# Patient Record
Sex: FEMALE | Race: WHITE | NOT HISPANIC OR LATINO | Employment: FULL TIME | URBAN - NONMETROPOLITAN AREA
[De-identification: names, ages, dates, MRNs, and addresses within clinical notes are randomized per-mention and may not be internally consistent; named-entity substitution may affect disease eponyms.]

---

## 2017-01-30 ENCOUNTER — OFFICE VISIT (OUTPATIENT)
Dept: ONCOLOGY | Facility: CLINIC | Age: 42
End: 2017-01-30

## 2017-01-30 ENCOUNTER — LAB (OUTPATIENT)
Dept: ONCOLOGY | Facility: CLINIC | Age: 42
End: 2017-01-30

## 2017-01-30 VITALS
RESPIRATION RATE: 16 BRPM | DIASTOLIC BLOOD PRESSURE: 78 MMHG | OXYGEN SATURATION: 95 % | SYSTOLIC BLOOD PRESSURE: 136 MMHG | WEIGHT: 133.4 LBS | HEART RATE: 84 BPM | HEIGHT: 64 IN | TEMPERATURE: 97.5 F | BODY MASS INDEX: 22.77 KG/M2

## 2017-01-30 DIAGNOSIS — C50.919 MALIGNANT NEOPLASM OF OTHER SPECIFIED SITES OF FEMALE BREAST: Primary | ICD-10-CM

## 2017-01-30 DIAGNOSIS — C50.919: Primary | ICD-10-CM

## 2017-01-30 LAB
ALBUMIN SERPL-MCNC: 4.3 G/DL (ref 3.5–5)
ALBUMIN/GLOB SERPL: 1.4 G/DL
ALP SERPL-CCNC: 43 U/L (ref 38–126)
ALT SERPL W P-5'-P-CCNC: 27 U/L (ref 9–52)
ANION GAP SERPL CALCULATED.3IONS-SCNC: 12 MMOL/L
AST SERPL-CCNC: 30 U/L (ref 5–40)
AUTO MIXED CELLS #: 0.4 10*3/UL (ref 0.1–1.5)
AUTO MIXED CELLS %: 6.1 % (ref 0.2–15.1)
BILIRUB SERPL-MCNC: 0.6 MG/DL (ref 0.2–1.3)
BUN BLD-MCNC: 17 MG/DL (ref 7–26)
BUN/CREAT SERPL: 18.9 (ref 7–25)
CALCIUM SPEC-SCNC: 9.2 MG/DL (ref 8.4–10.2)
CHLORIDE SERPL-SCNC: 102 MMOL/L (ref 98–107)
CO2 SERPL-SCNC: 29 MMOL/L (ref 22–30)
CREAT BLD-MCNC: 0.9 MG/DL (ref 0.7–1.4)
ERYTHROCYTE [DISTWIDTH] IN BLOOD BY AUTOMATED COUNT: 13.7 % (ref 11.5–14.5)
GFR SERPL CREATININE-BSD FRML MDRD: 70 ML/MIN/1.73
GLOBULIN UR ELPH-MCNC: 3 GM/DL
GLUCOSE BLD-MCNC: 108 MG/DL (ref 75–110)
HCT VFR BLD AUTO: 35.3 % (ref 37–47)
HGB BLD-MCNC: 10.8 G/DL (ref 12–16)
LYMPHOCYTES # BLD AUTO: 1.8 10*3/MM3 (ref 0.8–7)
LYMPHOCYTES NFR BLD AUTO: 29.2 % (ref 10–58.5)
MCH RBC QN AUTO: 27.7 PG (ref 27–31)
MCHC RBC AUTO-ENTMCNC: 30.6 G/DL (ref 33–37)
MCV RBC AUTO: 90.5 FL (ref 81–99)
NEUTROPHILS # BLD AUTO: 3.9 10*3/MM3 (ref 2–7.8)
NEUTROPHILS NFR BLD AUTO: 64.7 % (ref 37–92)
PLATELET # BLD AUTO: 222 10*3/MM3 (ref 130–400)
PMV BLD AUTO: 7.7 FL (ref 6–12)
POTASSIUM BLD-SCNC: 3.7 MMOL/L (ref 3.6–5)
PROT SERPL-MCNC: 7.3 G/DL (ref 6.3–8.2)
RBC # BLD AUTO: 3.9 10*6/MM3 (ref 4.2–5.4)
SODIUM BLD-SCNC: 143 MMOL/L (ref 137–145)
WBC NRBC COR # BLD: 6.1 10*3/MM3 (ref 4.8–10.8)

## 2017-01-30 PROCEDURE — 99214 OFFICE O/P EST MOD 30 MIN: CPT | Performed by: INTERNAL MEDICINE

## 2017-01-30 PROCEDURE — 80053 COMPREHEN METABOLIC PANEL: CPT | Performed by: INTERNAL MEDICINE

## 2017-01-30 PROCEDURE — 36415 COLL VENOUS BLD VENIPUNCTURE: CPT | Performed by: INTERNAL MEDICINE

## 2017-01-30 PROCEDURE — 85025 COMPLETE CBC W/AUTO DIFF WBC: CPT | Performed by: INTERNAL MEDICINE

## 2017-01-30 NOTE — MR AVS SNAPSHOT
"                        Vanesa Manuel   1/30/2017 2:20 PM   Office Visit    Dept Phone:  [phone]   Encounter #:  [id number]    Provider:  Keagan Lindsey MD   Department:  [facility] HEMATOLOGY & ONCOLOGY                Your Full Care Plan              Your Updated Medication List          This list is accurate as of: 1/30/17  3:04 PM.  Always use your most recent med list.                cefprozil 250 MG tablet   Commonly known as:  CEFZIL       clotrimazole 1 % cream   Commonly known as:  LOTRIMIN       tamoxifen 20 MG chemo tablet   Commonly known as:  NOLVADEX       venlafaxine 25 MG tablet   Commonly known as:  EFFEXOR               Instructions     None    Patient Instructions History      Upcoming Appointments     Visit Type Date Time Department    FOLLOW UP 1 UNIT 1/30/2017  2:20 PM [facility]    LAB 1/30/2017  2:00 PM [facility]    LAB 4/24/2017  2:00 PM [facility]    FOLLOW UP 1 UNIT 4/24/2017  2:20 PM [facility]      MyChart Signup     Our records indicate that you have declined [facility] MyChart signup. If you would like to sign up for MyChart, please email [email or web address] or call [phone] to obtain an activation code.             Other Info from Your Visit           Your Appointments     Apr 24, 2017  2:00 PM CDT   LAB with [facility]   [facility] HEMATOLOGY & ONCOLOGY ([place])    [address] [place] [address]   [phone]            Apr 24, 2017  2:20 PM CDT   FOLLOW UP with Keagan Lindsey MD   [facility] HEMATOLOGY & ONCOLOGY ([place])    [address] [place] [address]   [phone]              Allergies     Percocet [Oxycodone-acetaminophen]        Vital Signs     Blood Pressure Pulse Temperature Respirations Height Weight    136/78 84 97.5 °F (36.4 °C) (Tympanic) 16 64\" (162.6 cm) 133 lb 6.4 oz (60.5 kg)    Last Menstrual Period Oxygen Saturation Body Mass Index Smoking Status      "    (LMP Unknown) 95% 22.9 kg/m2 Former Smoker

## 2017-01-30 NOTE — PROGRESS NOTES
[facility]  HEMATOLOGY & ONCOLOGY        Subjective     VISIT DIAGNOSIS:   No diagnosis found.    REASON FOR VISIT:   No chief complaint on file.       HEMATOLOGY / ONCOLOGY HISTORY: Ms. Manuel is a 40 year old female patient, stage IIA breast carcinoma for which she underwent neoadjuvant chemotherapy with four  cycles of Adriamycin, Cytoxan followed by 12 weekly courses of Taxol. She did have ER positive, [place] positive, HER2/  deepika negative  disease and her BRCA testing was also negative. She underwent bilateral mastectomies, this was followed by reconstruction. She has  also been on Tamoxifen for hormonal manipulation since September 2014 and tolerating it well.   No history exists.       Cancer Staging Information:  No matching staging information was found for the patient.      INTERVAL HISTORY  Patient ID: Vanesa Manuel is a 41 y.o. year old female         Review of Systems   Constitutional: Negative.    HENT: Negative.    Eyes: Negative.    Respiratory: Negative.    Cardiovascular: Negative.    Gastrointestinal: Negative.    Endocrine: Negative.    Genitourinary: Negative.    Musculoskeletal: Negative.    Skin: Negative.    Allergic/Immunologic: Negative.    Neurological: Negative.    Hematological: Negative.    Psychiatric/Behavioral: Negative.             Medications:    Current Outpatient Prescriptions   Medication Sig Dispense Refill   • cefprozil (CEFZIL) 250 MG tablet TAKE 1 TABLET BY MOUTH EVERY 12 HOURS FOR 10 DAYS  0   • clotrimazole (LOTRIMIN) 1 % cream APPLY AS DIRECTED TWICE A DAY  1   • tamoxifen (NOLVADEX) 20 MG chemo tablet Take  by mouth Daily.     • venlafaxine (EFFEXOR) 25 MG tablet Take 25 mg by mouth 2 (Two) Times a Day.       No current facility-administered medications for this visit.        ALLERGIES:    Allergies   Allergen Reactions   • Percocet [Oxycodone-Acetaminophen]        Objective      @VITALS    No flowsheet data found.    General Appearance: Patient is awake,  alert, oriented and in no acute distress. Patient is welldeveloped, wellnourished, and appears stated age.  HEENT: Normocephalic. Sclerae clear, conjunctiva pink, extraocular movements intact, pupils, round, reactive to light and  accommodation. Mouth and throat are clear with moist oral mucosa.  NECK: Supple, no jugular venous distention, thyroid not enlarged.  LYMPH: No cervical, supraclavicular, axillary, or inguinal lymphadenopathy.  CHEST: Equal bilateral expansion, AP  diameter normal, resonant percussion note  LUNGS: Good air movement, no rales, rhonchi, rubs or wheezes with auscultation  CARDIO: Regular sinus rhythm, no murmurs, gallops or rubs.  ABDOMEN: Nondistended, soft, No tenderness, no guarding, no rebound, No hepatosplenomegaly. No abdominal masses. Bowel sounds positive. No hernia  GENITALIA: Not examined.  BREASTS: Not examined.  MUSKEL: No joint swelling, decreased motion, or inflammation  EXTREMS: No edema, clubbing, cyanosis, No varicose veins.  NEURO: Grossly nonfocal, Gait is coordinated and smooth, Cognition is preserved.  SKIN: No rashes, no ecchymoses, no petechia.  PSYCH: Oriented to time, place and person. Memory is preserved. Mood and affect appear normal      RECENT LABS:  No visits with results within 7 Day(s) from this visit.  Latest known visit with results is:    Lab on 12/30/2016   Component Date Value Ref Range Status   • Glucose 12/30/2016 142* 70 - 100 mg/dL Final   • BUN 12/30/2016 12  5 - 21 mg/dL Final   • Creatinine 12/30/2016 0.66  0.50 - 1.40 mg/dL Final   • Sodium 12/30/2016 141  135 - 145 mmol/L Final   • Potassium 12/30/2016 3.8  3.5 - 5.3 mmol/L Final   • Chloride 12/30/2016 100  98 - 110 mmol/L Final   • CO2 12/30/2016 30.0  24.0 - 31.0 mmol/L Final   • Calcium 12/30/2016 8.7  8.4 - 10.4 mg/dL Final   • Total Protein 12/30/2016 7.2  6.3 - 8.7 g/dL Final   • Albumin 12/30/2016 4.00  3.50 - 5.00 g/dL Final   • ALT (SGPT) 12/30/2016 31  0 - 54 U/L Final   • AST (SGOT)  12/30/2016 52* 7 - 45 U/L Final   • Alkaline Phosphatase 12/30/2016 40  24 - 120 U/L Final   • Total Bilirubin 12/30/2016 0.6  0.1 - 1.0 mg/dL Final   • eGFR Non African Amer 12/30/2016 100  >60 mL/min/1.73 Final   • Globulin 12/30/2016 3.2  gm/dL Final   • A/G Ratio 12/30/2016 1.3  1.1 - 2.5 g/dL Final   • BUN/Creatinine Ratio 12/30/2016 18.2  7.0 - 25.0 Final   • Anion Gap 12/30/2016 11.0  4.0 - 13.0 mmol/L Final   • WBC 12/30/2016 5.40  4.80 - 10.80 10*3/mm3 Final   • RBC 12/30/2016 3.63* 4.20 - 5.40 10*6/mm3 Final   • Hemoglobin 12/30/2016 10.0* 12.0 - 16.0 g/dL Final   • Hematocrit 12/30/2016 33.3* 37.0 - 47.0 % Final   • MCV 12/30/2016 91.7  81.0 - 99.0 fL Final   • MCH 12/30/2016 27.5  27.0 - 31.0 pg Final   • MCHC 12/30/2016 30.0* 33.0 - 37.0 g/dL Final   • RDW 12/30/2016 13.1  11.5 - 14.5 % Final   • MPV 12/30/2016 8.9  6.0 - 12.0 fL Final   • Platelets 12/30/2016 192  130 - 400 10*3/mm3 Final   • Neutrophil % 12/30/2016 63.1  37.0 - 92.0 % Final   • Lymphocyte % 12/30/2016 32.1  10.0 - 58.5 % Final   • Auto Mixed Cells % 12/30/2016 4.8  0.2 - 15.1 % Final   • Neutrophils, Absolute 12/30/2016 3.40  2.00 - 7.80 10*3/mm3 Final   • Lymphocytes, Absolute 12/30/2016 1.70  0.80 - 7.00 10*3/mm3 Final   • Auto Mixed Cells # 12/30/2016 0.30  0.10 - 1.50 10*3/uL Final       RADIOLOGY:  No results found.         Assessment/Plan      Ms. Mar is a 40 year old female patient, stage IIA breast carcinoma for which she underwent neoadjuvant chemotherapy with four  cycles of Adriamycin, Cytoxan followed by 12 weekly courses of Taxol. She did have ER positive, [place] positive, HER2/  deepika negative  disease and her BRCA testing was also negative. She underwent bilateral mastectomies, this was followed by reconstruction. She has  also been on Tamoxifen.  She is Pre menupausal and must take Gosrelin or Lupron with Tamoxifen for Ovarian supression, unless she opts for HSO for which I have encouraged her to talk to her OB.  Her  estradiol levels drawn back in November 30 were on the higher side at 75.  Of course she is premenopausal and I need to treat her with Lupron or Gosrelin to stop the ovaries from estrogen production unless she decides to get a hysterosalpinoopherectomy, for this she is trying to find a gynecologist.  She will let me know in the next 6-8 weeks.        Keagan Lindsey MD    1/30/2017    1:49 PM

## 2017-03-09 ENCOUNTER — OFFICE VISIT (OUTPATIENT)
Dept: OBGYN | Age: 42
End: 2017-03-09
Payer: MEDICAID

## 2017-03-09 VITALS
WEIGHT: 132 LBS | HEIGHT: 63 IN | HEART RATE: 88 BPM | DIASTOLIC BLOOD PRESSURE: 89 MMHG | BODY MASS INDEX: 23.39 KG/M2 | TEMPERATURE: 98.4 F | SYSTOLIC BLOOD PRESSURE: 130 MMHG

## 2017-03-09 DIAGNOSIS — Z12.4 SCREENING FOR MALIGNANT NEOPLASM OF CERVIX: ICD-10-CM

## 2017-03-09 DIAGNOSIS — Z85.3 PERSONAL HISTORY OF BREAST CANCER: ICD-10-CM

## 2017-03-09 DIAGNOSIS — Z76.89 ENCOUNTER TO ESTABLISH CARE WITH NEW DOCTOR: Primary | ICD-10-CM

## 2017-03-09 DIAGNOSIS — Z01.419 ENCOUNTER FOR GYNECOLOGICAL EXAMINATION WITHOUT ABNORMAL FINDING: ICD-10-CM

## 2017-03-09 DIAGNOSIS — R23.2 HOT FLASHES: ICD-10-CM

## 2017-03-09 DIAGNOSIS — Z80.49 FAMILY HISTORY OF UTERINE CANCER: ICD-10-CM

## 2017-03-09 DIAGNOSIS — Z79.810 USE OF TAMOXIFEN (NOLVADEX): ICD-10-CM

## 2017-03-09 DIAGNOSIS — Z80.0 FAMILY HISTORY OF COLON CANCER: ICD-10-CM

## 2017-03-09 PROCEDURE — 99386 PREV VISIT NEW AGE 40-64: CPT

## 2017-03-09 ASSESSMENT — ENCOUNTER SYMPTOMS
SHORTNESS OF BREATH: 0
CONSTIPATION: 0
DIARRHEA: 0
TROUBLE SWALLOWING: 0
BACK PAIN: 0
COLOR CHANGE: 0
COUGH: 0
BLOOD IN STOOL: 0

## 2017-03-22 ENCOUNTER — TELEPHONE (OUTPATIENT)
Dept: OBGYN | Age: 42
End: 2017-03-22

## 2017-03-27 ENCOUNTER — OFFICE VISIT (OUTPATIENT)
Dept: OBGYN | Age: 42
End: 2017-03-27
Payer: MEDICAID

## 2017-03-27 ENCOUNTER — HOSPITAL ENCOUNTER (OUTPATIENT)
Dept: ULTRASOUND IMAGING | Age: 42
Discharge: HOME | End: 2017-03-27
Payer: MEDICAID

## 2017-03-27 VITALS
BODY MASS INDEX: 23.92 KG/M2 | SYSTOLIC BLOOD PRESSURE: 134 MMHG | WEIGHT: 135 LBS | HEART RATE: 89 BPM | DIASTOLIC BLOOD PRESSURE: 81 MMHG | HEIGHT: 63 IN | TEMPERATURE: 98.4 F

## 2017-03-27 DIAGNOSIS — Z85.3 PERSONAL HISTORY OF BREAST CANCER: ICD-10-CM

## 2017-03-27 DIAGNOSIS — Z13.71 BRCA NEGATIVE: ICD-10-CM

## 2017-03-27 DIAGNOSIS — R87.610 ATYPICAL SQUAMOUS CELL CHANGES OF UNDETERMINED SIGNIFICANCE (ASCUS) ON CERVICAL CYTOLOGY WITH POSITIVE HIGH RISK HUMAN PAPILLOMA VIRUS (HPV): ICD-10-CM

## 2017-03-27 DIAGNOSIS — N92.6 IRREGULAR MENSES: ICD-10-CM

## 2017-03-27 DIAGNOSIS — Z79.810 USE OF TAMOXIFEN (NOLVADEX): ICD-10-CM

## 2017-03-27 DIAGNOSIS — R87.810 ATYPICAL SQUAMOUS CELL CHANGES OF UNDETERMINED SIGNIFICANCE (ASCUS) ON CERVICAL CYTOLOGY WITH POSITIVE HIGH RISK HUMAN PAPILLOMA VIRUS (HPV): ICD-10-CM

## 2017-03-27 DIAGNOSIS — Z80.49 FAMILY HISTORY OF UTERINE CANCER: ICD-10-CM

## 2017-03-27 PROCEDURE — 76830 TRANSVAGINAL US NON-OB: CPT

## 2017-03-27 PROCEDURE — 99214 OFFICE O/P EST MOD 30 MIN: CPT

## 2017-03-27 ASSESSMENT — ENCOUNTER SYMPTOMS
BACK PAIN: 0
BLOOD IN STOOL: 0
TROUBLE SWALLOWING: 0
SHORTNESS OF BREATH: 0
COLOR CHANGE: 0
COUGH: 0
DIARRHEA: 0
CONSTIPATION: 0

## 2017-04-12 ENCOUNTER — OFFICE VISIT (OUTPATIENT)
Dept: SURGERY | Age: 42
End: 2017-04-12
Payer: MEDICAID

## 2017-04-12 VITALS
HEART RATE: 78 BPM | WEIGHT: 137 LBS | BODY MASS INDEX: 23.39 KG/M2 | HEIGHT: 64 IN | RESPIRATION RATE: 16 BRPM | DIASTOLIC BLOOD PRESSURE: 68 MMHG | SYSTOLIC BLOOD PRESSURE: 114 MMHG

## 2017-04-12 DIAGNOSIS — Z90.13 S/P BILATERAL MASTECTOMY: Primary | ICD-10-CM

## 2017-04-12 DIAGNOSIS — C50.812 MALIGNANT NEOPLASM OF OVERLAPPING SITES OF LEFT FEMALE BREAST (HCC): ICD-10-CM

## 2017-04-12 DIAGNOSIS — Z13.71 BRCA NEGATIVE: ICD-10-CM

## 2017-04-12 PROCEDURE — 99213 OFFICE O/P EST LOW 20 MIN: CPT | Performed by: SURGERY

## 2017-04-13 ENCOUNTER — PROCEDURE VISIT (OUTPATIENT)
Dept: OBGYN | Age: 42
End: 2017-04-13
Payer: MEDICAID

## 2017-04-13 VITALS
HEART RATE: 80 BPM | WEIGHT: 135 LBS | SYSTOLIC BLOOD PRESSURE: 114 MMHG | HEIGHT: 64 IN | BODY MASS INDEX: 23.05 KG/M2 | DIASTOLIC BLOOD PRESSURE: 75 MMHG

## 2017-04-13 DIAGNOSIS — R87.810 ATYPICAL SQUAMOUS CELL CHANGES OF UNDETERMINED SIGNIFICANCE (ASCUS) ON CERVICAL CYTOLOGY WITH POSITIVE HIGH RISK HUMAN PAPILLOMA VIRUS (HPV): Primary | ICD-10-CM

## 2017-04-13 DIAGNOSIS — R87.610 ATYPICAL SQUAMOUS CELL CHANGES OF UNDETERMINED SIGNIFICANCE (ASCUS) ON CERVICAL CYTOLOGY WITH POSITIVE HIGH RISK HUMAN PAPILLOMA VIRUS (HPV): Primary | ICD-10-CM

## 2017-04-13 PROCEDURE — 57454 BX/CURETT OF CERVIX W/SCOPE: CPT

## 2017-04-13 PROCEDURE — 81025 URINE PREGNANCY TEST: CPT

## 2017-04-13 ASSESSMENT — ENCOUNTER SYMPTOMS
DIARRHEA: 0
SHORTNESS OF BREATH: 0
TROUBLE SWALLOWING: 0
COUGH: 0
COLOR CHANGE: 0
CONSTIPATION: 0
BACK PAIN: 0
BLOOD IN STOOL: 0

## 2017-04-19 ENCOUNTER — TELEPHONE (OUTPATIENT)
Dept: OBGYN | Age: 42
End: 2017-04-19

## 2017-04-20 ENCOUNTER — OFFICE VISIT (OUTPATIENT)
Dept: RETAIL CLINIC | Facility: CLINIC | Age: 42
End: 2017-04-20

## 2017-04-20 VITALS
RESPIRATION RATE: 18 BRPM | SYSTOLIC BLOOD PRESSURE: 120 MMHG | WEIGHT: 133.8 LBS | BODY MASS INDEX: 23.71 KG/M2 | OXYGEN SATURATION: 98 % | DIASTOLIC BLOOD PRESSURE: 84 MMHG | HEART RATE: 77 BPM | HEIGHT: 63 IN | TEMPERATURE: 99.5 F

## 2017-04-20 DIAGNOSIS — L03.011 PARONYCHIA OF RIGHT THUMB: ICD-10-CM

## 2017-04-20 DIAGNOSIS — J06.9 ACUTE URI: Primary | ICD-10-CM

## 2017-04-20 LAB
EXPIRATION DATE: NORMAL
EXPIRATION DATE: NORMAL
FLUAV AG NPH QL: NEGATIVE
FLUBV AG NPH QL: NEGATIVE
INTERNAL CONTROL: NORMAL
INTERNAL CONTROL: NORMAL
Lab: NORMAL
Lab: NORMAL
S PYO AG THROAT QL: NEGATIVE

## 2017-04-20 PROCEDURE — 99213 OFFICE O/P EST LOW 20 MIN: CPT | Performed by: NURSE PRACTITIONER

## 2017-04-20 PROCEDURE — 87880 STREP A ASSAY W/OPTIC: CPT | Performed by: NURSE PRACTITIONER

## 2017-04-20 PROCEDURE — 87804 INFLUENZA ASSAY W/OPTIC: CPT | Performed by: NURSE PRACTITIONER

## 2017-04-20 RX ORDER — CEPHALEXIN 500 MG/1
500 CAPSULE ORAL 4 TIMES DAILY
Qty: 28 CAPSULE | Refills: 0 | Status: SHIPPED | OUTPATIENT
Start: 2017-04-20 | End: 2017-04-27

## 2017-04-20 NOTE — PATIENT INSTRUCTIONS
"Upper Respiratory Infection, Adult  Most upper respiratory infections (URIs) are a viral infection of the air passages leading to the lungs. A URI affects the nose, throat, and upper air passages. The most common type of URI is nasopharyngitis and is typically referred to as \"the common cold.\"  URIs run their course and usually go away on their own. Most of the time, a URI does not require medical attention, but sometimes a bacterial infection in the upper airways can follow a viral infection. This is called a secondary infection. Sinus and middle ear infections are common types of secondary upper respiratory infections.  Bacterial pneumonia can also complicate a URI. A URI can worsen asthma and chronic obstructive pulmonary disease (COPD). Sometimes, these complications can require emergency medical care and may be life threatening.   CAUSES  Almost all URIs are caused by viruses. A virus is a type of germ and can spread from one person to another.   RISKS FACTORS  You may be at risk for a URI if:   · You smoke.    · You have chronic heart or lung disease.  · You have a weakened defense (immune) system.    · You are very young or very old.    · You have nasal allergies or asthma.  · You work in crowded or poorly ventilated areas.  · You work in health care facilities or schools.  SIGNS AND SYMPTOMS   Symptoms typically develop 2-3 days after you come in contact with a cold virus. Most viral URIs last 7-10 days. However, viral URIs from the influenza virus (flu virus) can last 14-18 days and are typically more severe. Symptoms may include:   · Runny or stuffy (congested) nose.    · Sneezing.    · Cough.    · Sore throat.    · Headache.    · Fatigue.    · Fever.    · Loss of appetite.    · Pain in your forehead, behind your eyes, and over your cheekbones (sinus pain).  · Muscle aches.    DIAGNOSIS   Your health care provider may diagnose a URI by:  · Physical exam.  · Tests to check that your symptoms are not due to " another condition such as:  ¨ Strep throat.  ¨ Sinusitis.  ¨ Pneumonia.  ¨ Asthma.  TREATMENT   A URI goes away on its own with time. It cannot be cured with medicines, but medicines may be prescribed or recommended to relieve symptoms. Medicines may help:  · Reduce your fever.  · Reduce your cough.  · Relieve nasal congestion.  HOME CARE INSTRUCTIONS   · Take medicines only as directed by your health care provider.    · Gargle warm saltwater or take cough drops to comfort your throat as directed by your health care provider.  · Use a warm mist humidifier or inhale steam from a shower to increase air moisture. This may make it easier to breathe.  · Drink enough fluid to keep your urine clear or pale yellow.    · Eat soups and other clear broths and maintain good nutrition.    · Rest as needed.    · Return to work when your temperature has returned to normal or as your health care provider advises. You may need to stay home longer to avoid infecting others. You can also use a face mask and careful hand washing to prevent spread of the virus.  · Increase the usage of your inhaler if you have asthma.    · Do not use any tobacco products, including cigarettes, chewing tobacco, or electronic cigarettes. If you need help quitting, ask your health care provider.  PREVENTION   The best way to protect yourself from getting a cold is to practice good hygiene.   · Avoid oral or hand contact with people with cold symptoms.    · Wash your hands often if contact occurs.    There is no clear evidence that vitamin C, vitamin E, echinacea, or exercise reduces the chance of developing a cold. However, it is always recommended to get plenty of rest, exercise, and practice good nutrition.   SEEK MEDICAL CARE IF:   · You are getting worse rather than better.    · Your symptoms are not controlled by medicine.    · You have chills.  · You have worsening shortness of breath.  · You have brown or red mucus.  · You have yellow or brown nasal  discharge.  · You have pain in your face, especially when you bend forward.  · You have a fever.  · You have swollen neck glands.  · You have pain while swallowing.  · You have white areas in the back of your throat.  SEEK IMMEDIATE MEDICAL CARE IF:   · You have severe or persistent:    Headache.    Ear pain.    Sinus pain.    Chest pain.  · You have chronic lung disease and any of the following:    Wheezing.    Prolonged cough.    Coughing up blood.    A change in your usual mucus.  · You have a stiff neck.  · You have changes in your:    Vision.    Hearing.    Thinking.    Mood.  MAKE SURE YOU:   · Understand these instructions.  · Will watch your condition.  · Will get help right away if you are not doing well or get worse.     This information is not intended to replace advice given to you by your health care provider. Make sure you discuss any questions you have with your health care provider.     Document Released: 06/13/2002 Document Revised: 05/03/2016 Document Reviewed: 03/25/2015  [employer] Interactive Patient Education ©2016 [employer]    Paronychia  Paronychia is an infection of the skin that surrounds a nail. It usually affects the skin around a fingernail, but it may also occur near a toenail. It often causes pain and swelling around the nail. This condition may come on suddenly or develop over a longer period. In some cases, a collection of pus (abscess) can form near or under the nail. Usually, paronychia is not serious and it clears up with treatment.  CAUSES  This condition may be caused by bacteria or fungi. It is commonly caused by either Streptococcus or Staphylococcus bacteria. The bacteria or fungi often cause the infection by getting into the affected area through an opening in the skin, such as a cut or a hangnail.  RISK FACTORS  This condition is more likely to develop in:  · People who get their hands wet often, such as those who work as dishwashers, , or nurses.  · People who  bite their fingernails or suck their thumbs.  · People who trim their nails too short.  · People who have hangnails or injured fingertips.  · People who get manicures.  · People who have diabetes.  SYMPTOMS  Symptoms of this condition include:  · Redness and swelling of the skin near the nail.  · Tenderness around the nail when you touch the area.  · Pus-filled bumps under the cuticle. The cuticle is the skin at the base or sides of the nail.  · Fluid or pus under the nail.  · Throbbing pain in the area.  DIAGNOSIS  This condition is usually diagnosed with a physical exam. In some cases, a sample of pus may be taken from an abscess to be tested in a lab. This can help to determine what type of bacteria or fungi is causing the condition.  TREATMENT  Treatment for this condition depends on the cause and severity of the condition. If the condition is mild, it may clear up on its own in a few days. Your health care provider may recommend soaking the affected area in warm water a few times a day. When treatment is needed, the options may include:  · Antibiotic medicine, if the condition is caused by a bacterial infection.  · Antifungal medicine, if the condition is caused by a fungal infection.  · Incision and drainage, if an abscess is present. In this procedure, the health care provider will cut open the abscess so the pus can drain out.  HOME CARE INSTRUCTIONS  · Soak the affected area in warm water if directed to do so by your health care provider. You may be told to do this for 20 minutes, 2-3 times a day. Keep the area dry in between soakings.  · Take medicines only as directed by your health care provider.  · If you were prescribed an antibiotic medicine, finish all of it even if you start to feel better.  · Keep the affected area clean.  · Do not try to drain a fluid-filled bump yourself.  · If you will be washing dishes or performing other tasks that require your hands to get wet, wear rubber gloves. You should  also wear gloves if your hands might come in contact with irritating substances, such as  or chemicals.  · Follow your health care provider's instructions about:    Wound care.    Bandage (dressing) changes and removal.  SEEK MEDICAL CARE IF:  · Your symptoms get worse or do not improve with treatment.  · You have a fever or chills.  · You have redness spreading from the affected area.  · You have continued or increased fluid, blood, or pus coming from the affected area.  · Your finger or knuckle becomes swollen or is difficult to move.     This information is not intended to replace advice given to you by your health care provider. Make sure you discuss any questions you have with your health care provider.     Document Released: 06/13/2002 Document Revised: 05/03/2016 Document Reviewed: 11/25/2015  [employer] Interactive Patient Education ©2016 [employer]      Take Tylenol or Motrin if needed. Continue good fluid intake. Gargle warm salt water if needed to soothe your throat. Try plain Mucinex.  If symptoms persist or worsen please see your primary care provider.     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 Currently you have evidence of a secondary bacterial infection. Take the antibiotic as prescribed.  Please soak your thumb in warm water a couple of times a day and apply ointment.  Please discuss further with your dermatologist at your appointment in early May. Seek care sooner if worse.    An antibiotic has been prescribed for you that needs to be taken as directed for the full length of treatment. It is recommended that you take a probiotic when taking an antibiotic. Probiotics are found over the counter in pill form and in some brands of yogurt.

## 2017-04-20 NOTE — PROGRESS NOTES
Chief Complaint   Patient presents with   • Sore Throat   • Cough     Subjective   Vanesa Manuel is a 42 y.o. female who presents to the clinic today with complaints sore throat which started two days ago. She now has hoarsness and a tickle in her throat that is causing her to cough. She isn't sure if she has had fever.     She also complains of ongoing issues with possible fungal infection of her right thumbnail which has been present for about a year and not responding to the topical treatments she has tried. She also had occasional redness around the nail. She reports a few days ago she was able to express some puss and that periodically she is able to do that. She reports it is a little tender at times, but otherwise not really painful.    Current Outpatient Prescriptions:   •  tamoxifen (NOLVADEX) 20 MG chemo tablet, Take  by mouth Daily., Disp: , Rfl:   •  clotrimazole (LOTRIMIN) 1 % cream, APPLY AS DIRECTED TWICE A DAY, Disp: , Rfl: 1  •  venlafaxine (EFFEXOR) 25 MG tablet, Take 25 mg by mouth 2 (Two) Times a Day., Disp: , Rfl:     Allergies:  Percocet [oxycodone-acetaminophen]    Past Medical History:   Diagnosis Date   • Anemia    • Breast cancer    • Cancer     breast     Past Surgical History:   Procedure Laterality Date   • BREAST BIOPSY Left    • BREAST SURGERY      biopsy   •  SECTION     •  SECTION     • OTHER SURGICAL HISTORY      took out expanders and placed implants   • WISDOM TOOTH EXTRACTION       Family History   Problem Relation Age of Onset   • Cancer Maternal Aunt      breast with mets   • Cancer Paternal Uncle      colon   • No Known Problems Mother    • No Known Problems Father    • No Known Problems Brother    • No Known Problems Daughter    • No Known Problems Daughter    • No Known Problems Daughter      Social History   Substance Use Topics   • Smoking status: Former Smoker   • Smokeless tobacco: None   • Alcohol use Yes      Comment: occasional       Review of  "Systems  Review of Systems   Constitutional: Positive for chills (no chills) and fatigue. Fever: maybe, unsure.   HENT: Positive for ear pain (off and on), postnasal drip, sneezing and sore throat. Negative for rhinorrhea and sinus pressure.    Eyes: Negative.    Respiratory: Positive for cough. Negative for shortness of breath and wheezing.    Gastrointestinal: Negative for abdominal pain, constipation, diarrhea, nausea and vomiting.   Musculoskeletal: Negative for arthralgias.   Skin:        Right thumb with chronic fungal infection, now with redness at base of nail   Neurological: Negative for headaches.       Objective   /84 (BP Location: Left arm, Patient Position: Sitting, Cuff Size: Adult)  Pulse 77  Temp 99.5 °F (37.5 °C) (Oral)   Resp 18  Ht 63\" (160 cm)  Wt 133 lb 12.8 oz (60.7 kg)  LMP 02/27/2017 (Approximate)  SpO2 98%  BMI 23.7 kg/m2      Physical Exam   Constitutional: She is oriented to person, place, and time. She appears well-developed and well-nourished. She is cooperative.   HENT:   Head: Normocephalic and atraumatic.   Right Ear: Tympanic membrane, external ear and ear canal normal. Tympanic membrane is not perforated, not erythematous, not retracted and not bulging.   Left Ear: Tympanic membrane, external ear and ear canal normal. Tympanic membrane is not perforated, not erythematous, not retracted and not bulging.   Nose: Nose normal. Right sinus exhibits no maxillary sinus tenderness and no frontal sinus tenderness. Left sinus exhibits no maxillary sinus tenderness and no frontal sinus tenderness.   Mouth/Throat: Uvula is midline and mucous membranes are normal. Posterior oropharyngeal erythema (clear post nasal drainage) present. Tonsils are 1+ on the right. Tonsils are 1+ on the left. No tonsillar exudate.   Eyes: Conjunctivae, EOM and lids are normal. Pupils are equal, round, and reactive to light.   Neck: Trachea normal and normal range of motion. Neck supple. "   Cardiovascular: Normal rate, regular rhythm, S1 normal, S2 normal and normal heart sounds.    Pulmonary/Chest: Effort normal and breath sounds normal. She has no wheezes. She has no rhonchi. She has no rales.   Lymphadenopathy:     She has no cervical adenopathy.   Neurological: She is alert and oriented to person, place, and time. Coordination and gait normal.   Skin: Skin is warm, dry and intact.   Right thumbnail, cracking and discolored (yellow), skin proximal right side of base of nail with mild redness, minimal edema and warmth. No evidence of abscess, no fluctuance.    Psychiatric: She has a normal mood and affect. Her speech is normal and behavior is normal.          Assessment/Plan     Vanesa was seen today for sore throat and cough.    Diagnoses and all orders for this visit:    Acute URI  -     POC Rapid Strep A- negative  -     POC Influenza A / B- negative    Paronychia of right thumb    Other orders  -     cephalexin (KEFLEX) 500 MG capsule; Take 1 capsule by mouth 4 (Four) Times a Day for 7 days.  -     mupirocin (BACTROBAN) 2 % ointment; Apply  topically 2 (Two) Times a Day.      Take Tylenol or Motrin if needed. Continue good fluid intake. Gargle warm salt water if needed to soothe your throat. Try plain Mucinex.  If symptoms persist or worsen please see your primary care provider.     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 Currently you have evidence of a secondary bacterial infection. Take the antibiotic as prescribed.  Please soak your thumb in warm water a couple of times a day and apply ointment.  Please discuss further with your dermatologist at your appointment in early May. Seek care sooner if worse.    An antibiotic has been prescribed for you that needs to be taken as directed for the full length of treatment. It is recommended that you take a probiotic when taking an antibiotic. Probiotics are found over the counter in pill form and in some brands of yogurt.

## 2017-04-25 ENCOUNTER — OFFICE VISIT (OUTPATIENT)
Dept: ONCOLOGY | Facility: CLINIC | Age: 42
End: 2017-04-25

## 2017-04-25 ENCOUNTER — LAB (OUTPATIENT)
Dept: ONCOLOGY | Facility: CLINIC | Age: 42
End: 2017-04-25

## 2017-04-25 VITALS
OXYGEN SATURATION: 97 % | DIASTOLIC BLOOD PRESSURE: 82 MMHG | RESPIRATION RATE: 16 BRPM | TEMPERATURE: 97.9 F | SYSTOLIC BLOOD PRESSURE: 118 MMHG | BODY MASS INDEX: 24.15 KG/M2 | WEIGHT: 136.3 LBS | HEART RATE: 84 BPM | HEIGHT: 63 IN

## 2017-04-25 DIAGNOSIS — C50.919 MALIGNANT NEOPLASM OF OTHER SPECIFIED SITES OF FEMALE BREAST: Primary | ICD-10-CM

## 2017-04-25 DIAGNOSIS — C50.919: Primary | ICD-10-CM

## 2017-04-25 LAB
ALBUMIN SERPL-MCNC: 4.2 G/DL (ref 3.5–5)
ALBUMIN/GLOB SERPL: 1.3 G/DL
ALP SERPL-CCNC: 41 U/L (ref 38–126)
ALT SERPL W P-5'-P-CCNC: 25 U/L (ref 9–52)
ANION GAP SERPL CALCULATED.3IONS-SCNC: 9 MMOL/L
AST SERPL-CCNC: 39 U/L (ref 5–40)
AUTO MIXED CELLS #: 0.5 10*3/UL (ref 0.1–1.5)
AUTO MIXED CELLS %: 6.4 % (ref 0.2–15.1)
BILIRUB SERPL-MCNC: 0.5 MG/DL (ref 0.2–1.3)
BUN BLD-MCNC: 15 MG/DL (ref 7–26)
BUN/CREAT SERPL: 21.4 (ref 7–25)
CALCIUM SPEC-SCNC: 9 MG/DL (ref 8.4–10.2)
CHLORIDE SERPL-SCNC: 106 MMOL/L (ref 98–107)
CO2 SERPL-SCNC: 27 MMOL/L (ref 22–30)
CREAT BLD-MCNC: 0.7 MG/DL (ref 0.7–1.4)
ERYTHROCYTE [DISTWIDTH] IN BLOOD BY AUTOMATED COUNT: 12.6 % (ref 11.5–14.5)
GFR SERPL CREATININE-BSD FRML MDRD: 93 ML/MIN/1.73
GLOBULIN UR ELPH-MCNC: 3.2 GM/DL
GLUCOSE BLD-MCNC: 157 MG/DL (ref 75–110)
HCT VFR BLD AUTO: 35.5 % (ref 37–47)
HGB BLD-MCNC: 11.2 G/DL (ref 12–16)
LYMPHOCYTES # BLD AUTO: 2.2 10*3/MM3 (ref 0.8–7)
LYMPHOCYTES NFR BLD AUTO: 28.3 % (ref 10–58.5)
MCH RBC QN AUTO: 28.7 PG (ref 27–31)
MCHC RBC AUTO-ENTMCNC: 31.5 G/DL (ref 33–37)
MCV RBC AUTO: 91.1 FL (ref 81–99)
NEUTROPHILS # BLD AUTO: 5.1 10*3/MM3 (ref 2–7.8)
NEUTROPHILS NFR BLD AUTO: 65.3 % (ref 37–92)
PLATELET # BLD AUTO: 254 10*3/MM3 (ref 130–400)
PMV BLD AUTO: 8.6 FL (ref 6–12)
POTASSIUM BLD-SCNC: 4.1 MMOL/L (ref 3.6–5)
PROT SERPL-MCNC: 7.4 G/DL (ref 6.3–8.2)
RBC # BLD AUTO: 3.9 10*6/MM3 (ref 4.2–5.4)
SODIUM BLD-SCNC: 142 MMOL/L (ref 137–145)
WBC NRBC COR # BLD: 7.8 10*3/MM3 (ref 4.8–10.8)

## 2017-04-25 PROCEDURE — 85025 COMPLETE CBC W/AUTO DIFF WBC: CPT | Performed by: INTERNAL MEDICINE

## 2017-04-25 PROCEDURE — 99214 OFFICE O/P EST MOD 30 MIN: CPT | Performed by: INTERNAL MEDICINE

## 2017-04-25 PROCEDURE — 80053 COMPREHEN METABOLIC PANEL: CPT | Performed by: INTERNAL MEDICINE

## 2017-04-25 RX ORDER — VENLAFAXINE HYDROCHLORIDE 75 MG/1
CAPSULE, EXTENDED RELEASE ORAL
COMMUNITY
Start: 2017-01-29 | End: 2018-12-19

## 2017-04-25 NOTE — PROGRESS NOTES
[facility]  HEMATOLOGY & ONCOLOGY        Subjective     VISIT DIAGNOSIS:   No diagnosis found.    REASON FOR VISIT:   No chief complaint on file.       HEMATOLOGY / ONCOLOGY HISTORY: Ms. Manuel is a 40 year old female patient, stage IIA breast carcinoma for which she underwent neoadjuvant chemotherapy with four  cycles of Adriamycin, Cytoxan followed by 12 weekly courses of Taxol. She did have ER positive, VT positive, HER2/  deepika negative  disease and her BRCA testing was also negative. She underwent bilateral mastectomies, this was followed by reconstruction. She has  also been on Tamoxifen for hormonal manipulation since September 2014 and tolerating it well.   No history exists.       Cancer Staging Information:  No matching staging information was found for the patient.      INTERVAL HISTORY  Patient ID: Vanesa Manuel is a 42 y.o. year old female         Review of Systems   Constitutional: Negative.    HENT: Negative.    Eyes: Negative.    Respiratory: Negative.    Cardiovascular: Negative.    Gastrointestinal: Negative.    Endocrine: Negative.    Genitourinary: Negative.    Musculoskeletal: Negative.    Skin: Negative.    Allergic/Immunologic: Negative.    Neurological: Negative.    Hematological: Negative.    Psychiatric/Behavioral: Negative.             Medications:    Current Outpatient Prescriptions   Medication Sig Dispense Refill   • cephalexin (KEFLEX) 500 MG capsule Take 1 capsule by mouth 4 (Four) Times a Day for 7 days. 28 capsule 0   • clotrimazole (LOTRIMIN) 1 % cream APPLY AS DIRECTED TWICE A DAY  1   • mupirocin (BACTROBAN) 2 % ointment Apply  topically 2 (Two) Times a Day. 22 g 0   • tamoxifen (NOLVADEX) 20 MG chemo tablet Take  by mouth Daily.     • venlafaxine (EFFEXOR) 25 MG tablet Take 25 mg by mouth 2 (Two) Times a Day.     • venlafaxine XR (EFFEXOR-XR) 75 MG 24 hr capsule        No current facility-administered medications for this visit.        ALLERGIES:    Allergies    Allergen Reactions   • Percocet [Oxycodone-Acetaminophen]        Objective      @VITALS    Current Status 4/25/2017   ECOG score 0       General Appearance: Patient is awake, alert, oriented and in no acute distress. Patient is welldeveloped, wellnourished, and appears stated age.  HEENT: Normocephalic. Sclerae clear, conjunctiva pink, extraocular movements intact, pupils, round, reactive to light and  accommodation. Mouth and throat are clear with moist oral mucosa.  NECK: Supple, no jugular venous distention, thyroid not enlarged.  LYMPH: No cervical, supraclavicular, axillary, or inguinal lymphadenopathy.  CHEST: Equal bilateral expansion, AP  diameter normal, resonant percussion note  LUNGS: Good air movement, no rales, rhonchi, rubs or wheezes with auscultation  CARDIO: Regular sinus rhythm, no murmurs, gallops or rubs.  ABDOMEN: Nondistended, soft, No tenderness, no guarding, no rebound, No hepatosplenomegaly. No abdominal masses. Bowel sounds positive. No hernia  GENITALIA: Not examined.  BREASTS: Not examined.  MUSKEL: No joint swelling, decreased motion, or inflammation  EXTREMS: No edema, clubbing, cyanosis, No varicose veins.  NEURO: Grossly nonfocal, Gait is coordinated and smooth, Cognition is preserved.  SKIN: No rashes, no ecchymoses, no petechia.  PSYCH: Oriented to time, place and person. Memory is preserved. Mood and affect appear normal      RECENT LABS:  Orders Only on 04/25/2017   Component Date Value Ref Range Status   • Glucose 04/25/2017 157* 75 - 110 mg/dL Final   • BUN 04/25/2017 15  7 - 26 mg/dL Final   • Creatinine 04/25/2017 0.70  0.70 - 1.40 mg/dL Final   • Sodium 04/25/2017 142  137 - 145 mmol/L Final   • Potassium 04/25/2017 4.1  3.6 - 5.0 mmol/L Final   • Chloride 04/25/2017 106  98 - 107 mmol/L Final   • CO2 04/25/2017 27.0  22.0 - 30.0 mmol/L Final   • Calcium 04/25/2017 9.0  8.4 - 10.2 mg/dL Final   • Total Protein 04/25/2017 7.4  6.3 - 8.2 g/dL Final   • Albumin 04/25/2017 4.20   3.50 - 5.00 g/dL Final   • ALT (SGPT) 04/25/2017 25  9 - 52 U/L Final   • AST (SGOT) 04/25/2017 39  5 - 40 U/L Final   • Alkaline Phosphatase 04/25/2017 41  38 - 126 U/L Final   • Total Bilirubin 04/25/2017 0.5  0.2 - 1.3 mg/dL Final   • eGFR Non African Amer 04/25/2017 93  >60 mL/min/1.73 Final   • Globulin 04/25/2017 3.2  gm/dL Final   • A/G Ratio 04/25/2017 1.3  g/dL Final   • BUN/Creatinine Ratio 04/25/2017 21.4  7.0 - 25.0 Final    Unable to calculate Bun/Crea Ratio.   • Anion Gap 04/25/2017 9.0  mmol/L Final   • WBC 04/25/2017 7.80  4.80 - 10.80 10*3/mm3 Final   • RBC 04/25/2017 3.90* 4.20 - 5.40 10*6/mm3 Final   • Hemoglobin 04/25/2017 11.2* 12.0 - 16.0 g/dL Final   • Hematocrit 04/25/2017 35.5* 37.0 - 47.0 % Final   • MCV 04/25/2017 91.1  81.0 - 99.0 fL Final   • MCH 04/25/2017 28.7  27.0 - 31.0 pg Final   • MCHC 04/25/2017 31.5* 33.0 - 37.0 g/dL Final   • RDW 04/25/2017 12.6  11.5 - 14.5 % Final   • MPV 04/25/2017 8.6  6.0 - 12.0 fL Final   • Platelets 04/25/2017 254  130 - 400 10*3/mm3 Final   • Neutrophil % 04/25/2017 65.3  37.0 - 92.0 % Final   • Lymphocyte % 04/25/2017 28.3  10.0 - 58.5 % Final   • Auto Mixed Cells % 04/25/2017 6.4  0.2 - 15.1 % Final   • Neutrophils, Absolute 04/25/2017 5.10  2.00 - 7.80 10*3/mm3 Final   • Lymphocytes, Absolute 04/25/2017 2.20  0.80 - 7.00 10*3/mm3 Final   • Auto Mixed Cells # 04/25/2017 0.50  0.10 - 1.50 10*3/uL Final   Office Visit on 04/20/2017   Component Date Value Ref Range Status   • Rapid Strep A Screen 04/20/2017 Negative  Negative, VALID, INVALID, Not Performed Final   • Internal Control 04/20/2017 Passed  Passed Final   • Lot Number 04/20/2017 [id number]   Final   • Expiration Date 04/20/2017 2018-08-31   Final   • Rapid Influenza A Ag 04/20/2017 negative   Final   • Rapid Influenza B Ag 04/20/2017 negative   Final   • Internal Control 04/20/2017 Passed  Passed Final   • Lot Number 04/20/2017 [id number]   Final   • Expiration Date 04/20/2017 2019-09-30   Final        RADIOLOGY:  No results found.         Assessment/Plan      Ms. Manuel is a 40 year old female patient, stage IIA breast carcinoma for which she underwent neoadjuvant chemotherapy with four  cycles of Adriamycin, Cytoxan followed by 12 weekly courses of Taxol. She did have ER positive, NC positive, HER2/  deepika negative  disease and her BRCA testing was also negative. She underwent bilateral mastectomies, this was followed by reconstruction. She has  also been on Tamoxifen.  She is Pre menupausal and must take Gosrelin or Lupron with Tamoxifen for Ovarian supression, unless she opts for HSO for which I have encouraged her to talk to her OB.  Her estradiol levels drawn back in November 30 were on the higher side at 75.  Of course she is premenopausal and I need to treat her with Lupron or Gosrelin to stop the ovaries from estrogen production unless she decides to get a hysterosalpinoopherectomy, for this she is trying to find a gynecologist.      She saw a gynecologist who performed a Pap smear and the cervical area was dysplastic therefore she is undergoing a LEEP procedure.  I again discussed treatment with Lupron or Gosrelin in combination with tamoxifen for her estrogen receptor positive tumor in the face of her premenopausal and still having off-and-on periods versus hysterosalphingoopherectomy.        Keagan Lindsey MD    4/25/2017    4:23 PM

## 2017-04-27 ENCOUNTER — OFFICE VISIT (OUTPATIENT)
Dept: OBGYN | Age: 42
End: 2017-04-27
Payer: MEDICAID

## 2017-04-27 VITALS
BODY MASS INDEX: 23.22 KG/M2 | WEIGHT: 136 LBS | HEIGHT: 64 IN | DIASTOLIC BLOOD PRESSURE: 60 MMHG | SYSTOLIC BLOOD PRESSURE: 120 MMHG

## 2017-04-27 DIAGNOSIS — N83.201 RIGHT OVARIAN CYST: ICD-10-CM

## 2017-04-27 DIAGNOSIS — R87.610 ATYPICAL SQUAMOUS CELL CHANGES OF UNDETERMINED SIGNIFICANCE (ASCUS) ON CERVICAL CYTOLOGY WITH POSITIVE HIGH RISK HUMAN PAPILLOMA VIRUS (HPV): ICD-10-CM

## 2017-04-27 DIAGNOSIS — Z85.3 PERSONAL HISTORY OF BREAST CANCER: ICD-10-CM

## 2017-04-27 DIAGNOSIS — R87.810 ATYPICAL SQUAMOUS CELL CHANGES OF UNDETERMINED SIGNIFICANCE (ASCUS) ON CERVICAL CYTOLOGY WITH POSITIVE HIGH RISK HUMAN PAPILLOMA VIRUS (HPV): ICD-10-CM

## 2017-04-27 DIAGNOSIS — Z98.890 H/O CERVICAL BIOPSY: ICD-10-CM

## 2017-04-27 DIAGNOSIS — Z13.71 BRCA NEGATIVE: Primary | ICD-10-CM

## 2017-04-27 DIAGNOSIS — Z79.810 USE OF TAMOXIFEN (NOLVADEX): ICD-10-CM

## 2017-04-27 PROCEDURE — 99213 OFFICE O/P EST LOW 20 MIN: CPT | Performed by: NURSE PRACTITIONER

## 2017-04-27 RX ORDER — VENLAFAXINE HYDROCHLORIDE 75 MG/1
75 CAPSULE, EXTENDED RELEASE ORAL DAILY
Qty: 30 CAPSULE | Refills: 11 | Status: SHIPPED | OUTPATIENT
Start: 2017-04-27 | End: 2019-05-30

## 2017-04-27 ASSESSMENT — ENCOUNTER SYMPTOMS
SHORTNESS OF BREATH: 0
TROUBLE SWALLOWING: 0
CONSTIPATION: 0
ABDOMINAL PAIN: 0
APNEA: 0
NAUSEA: 0
SORE THROAT: 0
WHEEZING: 0
DIARRHEA: 0

## 2017-05-01 ENCOUNTER — TELEPHONE (OUTPATIENT)
Dept: OBGYN | Age: 42
End: 2017-05-01

## 2017-05-12 ENCOUNTER — HOSPITAL ENCOUNTER (OUTPATIENT)
Dept: PREADMISSION TESTING | Age: 42
Discharge: HOME | End: 2017-05-12
Payer: MEDICAID

## 2017-05-12 VITALS — HEIGHT: 64 IN | BODY MASS INDEX: 22.36 KG/M2 | WEIGHT: 131 LBS

## 2017-05-12 LAB
ALBUMIN SERPL-MCNC: 4.2 G/DL (ref 3.5–5.2)
ALP BLD-CCNC: 34 U/L (ref 35–104)
ALT SERPL-CCNC: 11 U/L (ref 5–33)
ANION GAP SERPL CALCULATED.3IONS-SCNC: 13 MMOL/L (ref 7–19)
AST SERPL-CCNC: 17 U/L (ref 5–32)
BASOPHILS ABSOLUTE: 0 K/UL (ref 0–0.2)
BASOPHILS RELATIVE PERCENT: 0.7 % (ref 0–1)
BILIRUB SERPL-MCNC: 0.4 MG/DL (ref 0.2–1.2)
BUN BLDV-MCNC: 9 MG/DL (ref 6–20)
CALCIUM SERPL-MCNC: 8.8 MG/DL (ref 8.6–10)
CHLORIDE BLD-SCNC: 103 MMOL/L (ref 98–111)
CO2: 25 MMOL/L (ref 22–29)
CREAT SERPL-MCNC: 0.6 MG/DL (ref 0.5–0.9)
EOSINOPHILS ABSOLUTE: 0.1 K/UL (ref 0–0.6)
EOSINOPHILS RELATIVE PERCENT: 1.4 % (ref 0–5)
GFR NON-AFRICAN AMERICAN: >60
GLOBULIN: 2.5 G/DL
GLUCOSE BLD-MCNC: 94 MG/DL (ref 74–109)
HCT VFR BLD CALC: 34.8 % (ref 37–47)
HEMOGLOBIN: 11.1 G/DL (ref 12–16)
LYMPHOCYTES ABSOLUTE: 1.2 K/UL (ref 1.1–4.5)
LYMPHOCYTES RELATIVE PERCENT: 26.8 % (ref 20–40)
MCH RBC QN AUTO: 28.7 PG (ref 27–31)
MCHC RBC AUTO-ENTMCNC: 31.9 G/DL (ref 33–37)
MCV RBC AUTO: 89.9 FL (ref 81–99)
MONOCYTES ABSOLUTE: 0.5 K/UL (ref 0–0.9)
MONOCYTES RELATIVE PERCENT: 11.2 % (ref 0–10)
NEUTROPHILS ABSOLUTE: 2.6 K/UL (ref 1.5–7.5)
NEUTROPHILS RELATIVE PERCENT: 59.7 % (ref 50–65)
PDW BLD-RTO: 12.2 % (ref 11.5–14.5)
PLATELET # BLD: 236 K/UL (ref 130–400)
PMV BLD AUTO: 10.1 FL (ref 7.4–10.4)
POTASSIUM SERPL-SCNC: 4.2 MMOL/L (ref 3.5–5)
RBC # BLD: 3.87 M/UL (ref 4.2–5.4)
SODIUM BLD-SCNC: 141 MMOL/L (ref 136–145)
TOTAL PROTEIN: 6.7 G/DL (ref 6.6–8.7)
WBC # BLD: 4.4 K/UL (ref 4.8–10.8)

## 2017-05-12 PROCEDURE — 85025 COMPLETE CBC W/AUTO DIFF WBC: CPT

## 2017-05-12 PROCEDURE — 80053 COMPREHEN METABOLIC PANEL: CPT

## 2017-05-18 ENCOUNTER — ANESTHESIA EVENT (OUTPATIENT)
Dept: OPERATING ROOM | Age: 42
End: 2017-05-18
Payer: MEDICAID

## 2017-05-19 ENCOUNTER — ANESTHESIA (OUTPATIENT)
Dept: OPERATING ROOM | Age: 42
End: 2017-05-19
Payer: MEDICAID

## 2017-05-19 ENCOUNTER — HOSPITAL ENCOUNTER (OUTPATIENT)
Age: 42
Setting detail: OBSERVATION
Discharge: HOME | End: 2017-05-20
Attending: OBSTETRICS & GYNECOLOGY | Admitting: OBSTETRICS & GYNECOLOGY
Payer: MEDICAID

## 2017-05-19 VITALS
SYSTOLIC BLOOD PRESSURE: 106 MMHG | RESPIRATION RATE: 1 BRPM | OXYGEN SATURATION: 100 % | DIASTOLIC BLOOD PRESSURE: 64 MMHG | TEMPERATURE: 96.9 F

## 2017-05-19 LAB — HCG(URINE) PREGNANCY TEST: NEGATIVE

## 2017-05-19 PROCEDURE — 3600000015 HC SURGERY LEVEL 5 ADDTL 15MIN: Performed by: OBSTETRICS & GYNECOLOGY

## 2017-05-19 PROCEDURE — 2580000003 HC RX 258: Performed by: OBSTETRICS & GYNECOLOGY

## 2017-05-19 PROCEDURE — 81025 URINE PREGNANCY TEST: CPT

## 2017-05-19 PROCEDURE — 3600000005 HC SURGERY LEVEL 5 BASE: Performed by: OBSTETRICS & GYNECOLOGY

## 2017-05-19 PROCEDURE — 2580000003 HC RX 258: Performed by: ANESTHESIOLOGY

## 2017-05-19 PROCEDURE — 2720000003 HC MISC SUTURE/STAPLES/RELOADS/ETC: Performed by: OBSTETRICS & GYNECOLOGY

## 2017-05-19 PROCEDURE — 6360000002 HC RX W HCPCS: Performed by: OBSTETRICS & GYNECOLOGY

## 2017-05-19 PROCEDURE — 6360000002 HC RX W HCPCS: Performed by: ANESTHESIOLOGY

## 2017-05-19 PROCEDURE — 6370000000 HC RX 637 (ALT 250 FOR IP): Performed by: ANESTHESIOLOGY

## 2017-05-19 PROCEDURE — G0378 HOSPITAL OBSERVATION PER HR: HCPCS

## 2017-05-19 PROCEDURE — 2500000003 HC RX 250 WO HCPCS: Performed by: OBSTETRICS & GYNECOLOGY

## 2017-05-19 PROCEDURE — 88307 TISSUE EXAM BY PATHOLOGIST: CPT

## 2017-05-19 PROCEDURE — 1220000000 HC SEMI PRIVATE OB R&B

## 2017-05-19 PROCEDURE — 3700000000 HC ANESTHESIA ATTENDED CARE: Performed by: OBSTETRICS & GYNECOLOGY

## 2017-05-19 PROCEDURE — 2720000000 HC MISC SURG SUPPLY STERILE $0-50: Performed by: OBSTETRICS & GYNECOLOGY

## 2017-05-19 PROCEDURE — 7100000000 HC PACU RECOVERY - FIRST 15 MIN: Performed by: OBSTETRICS & GYNECOLOGY

## 2017-05-19 PROCEDURE — 6360000002 HC RX W HCPCS: Performed by: NURSE ANESTHETIST, CERTIFIED REGISTERED

## 2017-05-19 PROCEDURE — 7100000001 HC PACU RECOVERY - ADDTL 15 MIN: Performed by: OBSTETRICS & GYNECOLOGY

## 2017-05-19 PROCEDURE — 6370000000 HC RX 637 (ALT 250 FOR IP): Performed by: OBSTETRICS & GYNECOLOGY

## 2017-05-19 PROCEDURE — 2500000003 HC RX 250 WO HCPCS: Performed by: ANESTHESIOLOGY

## 2017-05-19 PROCEDURE — 2500000003 HC RX 250 WO HCPCS: Performed by: NURSE ANESTHETIST, CERTIFIED REGISTERED

## 2017-05-19 PROCEDURE — 3700000001 HC ADD 15 MINUTES (ANESTHESIA): Performed by: OBSTETRICS & GYNECOLOGY

## 2017-05-19 PROCEDURE — 2720000001 HC MISC SURG SUPPLY STERILE $51-500: Performed by: OBSTETRICS & GYNECOLOGY

## 2017-05-19 RX ORDER — LABETALOL HYDROCHLORIDE 5 MG/ML
5 INJECTION, SOLUTION INTRAVENOUS EVERY 10 MIN PRN
Status: DISCONTINUED | OUTPATIENT
Start: 2017-05-19 | End: 2017-05-19 | Stop reason: HOSPADM

## 2017-05-19 RX ORDER — FENTANYL CITRATE 50 UG/ML
50 INJECTION, SOLUTION INTRAMUSCULAR; INTRAVENOUS
Status: DISCONTINUED | OUTPATIENT
Start: 2017-05-19 | End: 2017-05-19 | Stop reason: HOSPADM

## 2017-05-19 RX ORDER — SODIUM CHLORIDE, SODIUM LACTATE, POTASSIUM CHLORIDE, CALCIUM CHLORIDE 600; 310; 30; 20 MG/100ML; MG/100ML; MG/100ML; MG/100ML
INJECTION, SOLUTION INTRAVENOUS CONTINUOUS
Status: DISCONTINUED | OUTPATIENT
Start: 2017-05-19 | End: 2017-05-20 | Stop reason: HOSPADM

## 2017-05-19 RX ORDER — FAMOTIDINE 20 MG/1
20 TABLET, FILM COATED ORAL
Status: COMPLETED | OUTPATIENT
Start: 2017-05-19 | End: 2017-05-19

## 2017-05-19 RX ORDER — SODIUM CHLORIDE 0.9 % (FLUSH) 0.9 %
10 SYRINGE (ML) INJECTION EVERY 12 HOURS SCHEDULED
Status: DISCONTINUED | OUTPATIENT
Start: 2017-05-19 | End: 2017-05-19 | Stop reason: HOSPADM

## 2017-05-19 RX ORDER — IBUPROFEN 400 MG/1
400 TABLET ORAL EVERY 6 HOURS PRN
Status: DISCONTINUED | OUTPATIENT
Start: 2017-05-19 | End: 2017-05-20 | Stop reason: HOSPADM

## 2017-05-19 RX ORDER — METOCLOPRAMIDE HYDROCHLORIDE 5 MG/ML
10 INJECTION INTRAMUSCULAR; INTRAVENOUS
Status: DISCONTINUED | OUTPATIENT
Start: 2017-05-19 | End: 2017-05-19 | Stop reason: HOSPADM

## 2017-05-19 RX ORDER — SCOLOPAMINE TRANSDERMAL SYSTEM 1 MG/1
1 PATCH, EXTENDED RELEASE TRANSDERMAL
Status: DISCONTINUED | OUTPATIENT
Start: 2017-05-19 | End: 2017-05-20 | Stop reason: HOSPADM

## 2017-05-19 RX ORDER — ONDANSETRON 2 MG/ML
4 INJECTION INTRAMUSCULAR; INTRAVENOUS EVERY 8 HOURS PRN
Status: DISCONTINUED | OUTPATIENT
Start: 2017-05-19 | End: 2017-05-20 | Stop reason: HOSPADM

## 2017-05-19 RX ORDER — MEPERIDINE HYDROCHLORIDE 50 MG/ML
12.5 INJECTION INTRAMUSCULAR; INTRAVENOUS; SUBCUTANEOUS EVERY 5 MIN PRN
Status: DISCONTINUED | OUTPATIENT
Start: 2017-05-19 | End: 2017-05-19 | Stop reason: HOSPADM

## 2017-05-19 RX ORDER — LIDOCAINE HYDROCHLORIDE 10 MG/ML
1 INJECTION, SOLUTION EPIDURAL; INFILTRATION; INTRACAUDAL; PERINEURAL
Status: COMPLETED | OUTPATIENT
Start: 2017-05-19 | End: 2017-05-19

## 2017-05-19 RX ORDER — MORPHINE SULFATE 4 MG/ML
2 INJECTION, SOLUTION INTRAMUSCULAR; INTRAVENOUS EVERY 5 MIN PRN
Status: DISCONTINUED | OUTPATIENT
Start: 2017-05-19 | End: 2017-05-19 | Stop reason: HOSPADM

## 2017-05-19 RX ORDER — METOCLOPRAMIDE HYDROCHLORIDE 5 MG/ML
10 INJECTION INTRAMUSCULAR; INTRAVENOUS
Status: ACTIVE | OUTPATIENT
Start: 2017-05-19 | End: 2017-05-19

## 2017-05-19 RX ORDER — KETOROLAC TROMETHAMINE 30 MG/ML
INJECTION, SOLUTION INTRAMUSCULAR; INTRAVENOUS PRN
Status: DISCONTINUED | OUTPATIENT
Start: 2017-05-19 | End: 2017-05-19 | Stop reason: SDUPTHER

## 2017-05-19 RX ORDER — APREPITANT 40 MG/1
40 CAPSULE ORAL ONCE
Status: COMPLETED | OUTPATIENT
Start: 2017-05-19 | End: 2017-05-19

## 2017-05-19 RX ORDER — MIDAZOLAM HYDROCHLORIDE 1 MG/ML
2 INJECTION INTRAMUSCULAR; INTRAVENOUS
Status: DISCONTINUED | OUTPATIENT
Start: 2017-05-19 | End: 2017-05-19 | Stop reason: HOSPADM

## 2017-05-19 RX ORDER — MORPHINE SULFATE 4 MG/ML
4 INJECTION, SOLUTION INTRAMUSCULAR; INTRAVENOUS EVERY 5 MIN PRN
Status: DISCONTINUED | OUTPATIENT
Start: 2017-05-19 | End: 2017-05-19 | Stop reason: HOSPADM

## 2017-05-19 RX ORDER — DIPHENHYDRAMINE HYDROCHLORIDE 50 MG/ML
12.5 INJECTION INTRAMUSCULAR; INTRAVENOUS
Status: DISCONTINUED | OUTPATIENT
Start: 2017-05-19 | End: 2017-05-19 | Stop reason: HOSPADM

## 2017-05-19 RX ORDER — DEXTROSE, SODIUM CHLORIDE, SODIUM LACTATE, POTASSIUM CHLORIDE, AND CALCIUM CHLORIDE 5; .6; .31; .03; .02 G/100ML; G/100ML; G/100ML; G/100ML; G/100ML
INJECTION, SOLUTION INTRAVENOUS CONTINUOUS
Status: DISCONTINUED | OUTPATIENT
Start: 2017-05-19 | End: 2017-05-20 | Stop reason: HOSPADM

## 2017-05-19 RX ORDER — SODIUM CHLORIDE 0.9 % (FLUSH) 0.9 %
10 SYRINGE (ML) INJECTION PRN
Status: DISCONTINUED | OUTPATIENT
Start: 2017-05-19 | End: 2017-05-19 | Stop reason: HOSPADM

## 2017-05-19 RX ORDER — CEFAZOLIN SODIUM 1 G/3ML
INJECTION, POWDER, FOR SOLUTION INTRAMUSCULAR; INTRAVENOUS PRN
Status: DISCONTINUED | OUTPATIENT
Start: 2017-05-19 | End: 2017-05-19 | Stop reason: SDUPTHER

## 2017-05-19 RX ORDER — HYDROCODONE BITARTRATE AND ACETAMINOPHEN 5; 325 MG/1; MG/1
1 TABLET ORAL EVERY 4 HOURS PRN
Status: DISCONTINUED | OUTPATIENT
Start: 2017-05-19 | End: 2017-05-20 | Stop reason: HOSPADM

## 2017-05-19 RX ORDER — MORPHINE SULFATE 4 MG/ML
4 INJECTION, SOLUTION INTRAMUSCULAR; INTRAVENOUS
Status: DISCONTINUED | OUTPATIENT
Start: 2017-05-19 | End: 2017-05-20 | Stop reason: HOSPADM

## 2017-05-19 RX ORDER — MIDAZOLAM HYDROCHLORIDE 1 MG/ML
INJECTION INTRAMUSCULAR; INTRAVENOUS PRN
Status: DISCONTINUED | OUTPATIENT
Start: 2017-05-19 | End: 2017-05-19 | Stop reason: SDUPTHER

## 2017-05-19 RX ORDER — BUPIVACAINE HYDROCHLORIDE AND EPINEPHRINE 2.5; 5 MG/ML; UG/ML
INJECTION, SOLUTION EPIDURAL; INFILTRATION; INTRACAUDAL; PERINEURAL PRN
Status: DISCONTINUED | OUTPATIENT
Start: 2017-05-19 | End: 2017-05-19 | Stop reason: HOSPADM

## 2017-05-19 RX ORDER — MORPHINE SULFATE 4 MG/ML
4 INJECTION, SOLUTION INTRAMUSCULAR; INTRAVENOUS
Status: DISCONTINUED | OUTPATIENT
Start: 2017-05-19 | End: 2017-05-19 | Stop reason: HOSPADM

## 2017-05-19 RX ORDER — LIDOCAINE HYDROCHLORIDE 10 MG/ML
INJECTION, SOLUTION INFILTRATION; PERINEURAL PRN
Status: DISCONTINUED | OUTPATIENT
Start: 2017-05-19 | End: 2017-05-19 | Stop reason: SDUPTHER

## 2017-05-19 RX ORDER — LIDOCAINE 50 MG/G
PATCH TOPICAL PRN
Status: DISCONTINUED | OUTPATIENT
Start: 2017-05-19 | End: 2017-05-19 | Stop reason: HOSPADM

## 2017-05-19 RX ORDER — BISACODYL 10 MG
10 SUPPOSITORY, RECTAL RECTAL DAILY PRN
Status: DISCONTINUED | OUTPATIENT
Start: 2017-05-19 | End: 2017-05-20 | Stop reason: HOSPADM

## 2017-05-19 RX ORDER — HYDRALAZINE HYDROCHLORIDE 20 MG/ML
5 INJECTION INTRAMUSCULAR; INTRAVENOUS EVERY 10 MIN PRN
Status: DISCONTINUED | OUTPATIENT
Start: 2017-05-19 | End: 2017-05-19 | Stop reason: HOSPADM

## 2017-05-19 RX ORDER — SIMETHICONE 80 MG
80 TABLET,CHEWABLE ORAL EVERY 6 HOURS PRN
Status: DISCONTINUED | OUTPATIENT
Start: 2017-05-19 | End: 2017-05-20 | Stop reason: HOSPADM

## 2017-05-19 RX ORDER — ONDANSETRON 2 MG/ML
INJECTION INTRAMUSCULAR; INTRAVENOUS PRN
Status: DISCONTINUED | OUTPATIENT
Start: 2017-05-19 | End: 2017-05-19 | Stop reason: SDUPTHER

## 2017-05-19 RX ORDER — DEXAMETHASONE SODIUM PHOSPHATE 10 MG/ML
INJECTION INTRAMUSCULAR; INTRAVENOUS PRN
Status: DISCONTINUED | OUTPATIENT
Start: 2017-05-19 | End: 2017-05-19 | Stop reason: SDUPTHER

## 2017-05-19 RX ORDER — PROPOFOL 10 MG/ML
INJECTION, EMULSION INTRAVENOUS CONTINUOUS PRN
Status: DISCONTINUED | OUTPATIENT
Start: 2017-05-19 | End: 2017-05-19 | Stop reason: SDUPTHER

## 2017-05-19 RX ORDER — PROMETHAZINE HYDROCHLORIDE 25 MG/ML
6.25 INJECTION, SOLUTION INTRAMUSCULAR; INTRAVENOUS
Status: DISCONTINUED | OUTPATIENT
Start: 2017-05-19 | End: 2017-05-19 | Stop reason: HOSPADM

## 2017-05-19 RX ORDER — SODIUM CHLORIDE 0.9 % (FLUSH) 0.9 %
10 SYRINGE (ML) INJECTION PRN
Status: DISCONTINUED | OUTPATIENT
Start: 2017-05-19 | End: 2017-05-20 | Stop reason: HOSPADM

## 2017-05-19 RX ORDER — DOCUSATE SODIUM 100 MG/1
100 CAPSULE, LIQUID FILLED ORAL 2 TIMES DAILY
Status: DISCONTINUED | OUTPATIENT
Start: 2017-05-19 | End: 2017-05-20 | Stop reason: HOSPADM

## 2017-05-19 RX ORDER — KETOROLAC TROMETHAMINE 30 MG/ML
30 INJECTION, SOLUTION INTRAMUSCULAR; INTRAVENOUS EVERY 6 HOURS
Status: DISCONTINUED | OUTPATIENT
Start: 2017-05-19 | End: 2017-05-20 | Stop reason: HOSPADM

## 2017-05-19 RX ORDER — FENTANYL CITRATE 50 UG/ML
INJECTION, SOLUTION INTRAMUSCULAR; INTRAVENOUS PRN
Status: DISCONTINUED | OUTPATIENT
Start: 2017-05-19 | End: 2017-05-19 | Stop reason: SDUPTHER

## 2017-05-19 RX ORDER — VECURONIUM BROMIDE 1 MG/ML
INJECTION, POWDER, LYOPHILIZED, FOR SOLUTION INTRAVENOUS PRN
Status: DISCONTINUED | OUTPATIENT
Start: 2017-05-19 | End: 2017-05-19 | Stop reason: SDUPTHER

## 2017-05-19 RX ORDER — SODIUM CHLORIDE 0.9 % (FLUSH) 0.9 %
10 SYRINGE (ML) INJECTION EVERY 12 HOURS SCHEDULED
Status: DISCONTINUED | OUTPATIENT
Start: 2017-05-19 | End: 2017-05-20 | Stop reason: HOSPADM

## 2017-05-19 RX ORDER — PROPOFOL 10 MG/ML
INJECTION, EMULSION INTRAVENOUS PRN
Status: DISCONTINUED | OUTPATIENT
Start: 2017-05-19 | End: 2017-05-19 | Stop reason: SDUPTHER

## 2017-05-19 RX ORDER — MORPHINE SULFATE 4 MG/ML
2 INJECTION, SOLUTION INTRAMUSCULAR; INTRAVENOUS
Status: DISCONTINUED | OUTPATIENT
Start: 2017-05-19 | End: 2017-05-20 | Stop reason: HOSPADM

## 2017-05-19 RX ADMIN — FENTANYL CITRATE 50 MCG: 50 INJECTION INTRAMUSCULAR; INTRAVENOUS at 10:34

## 2017-05-19 RX ADMIN — KETOROLAC TROMETHAMINE 30 MG: 30 INJECTION, SOLUTION INTRAMUSCULAR at 11:22

## 2017-05-19 RX ADMIN — LIDOCAINE HYDROCHLORIDE 50 MG: 10 INJECTION, SOLUTION INFILTRATION; PERINEURAL at 09:53

## 2017-05-19 RX ADMIN — Medication 10 ML: at 21:11

## 2017-05-19 RX ADMIN — APREPITANT 40 MG: 40 CAPSULE ORAL at 07:57

## 2017-05-19 RX ADMIN — HYDROMORPHONE HYDROCHLORIDE 0.5 MG: 1 INJECTION, SOLUTION INTRAMUSCULAR; INTRAVENOUS; SUBCUTANEOUS at 11:35

## 2017-05-19 RX ADMIN — CEFAZOLIN 1000 MG: 1 INJECTION, POWDER, FOR SOLUTION INTRAVENOUS at 09:58

## 2017-05-19 RX ADMIN — PROPOFOL 100 MCG/KG/MIN: 10 INJECTION, EMULSION INTRAVENOUS at 09:50

## 2017-05-19 RX ADMIN — DEXAMETHASONE SODIUM PHOSPHATE 10 MG: 10 INJECTION INTRAMUSCULAR; INTRAVENOUS at 10:15

## 2017-05-19 RX ADMIN — FENTANYL CITRATE 50 MCG: 50 INJECTION INTRAMUSCULAR; INTRAVENOUS at 10:14

## 2017-05-19 RX ADMIN — VECURONIUM BROMIDE FOR INJECTION 6 MG: 1 INJECTION, POWDER, LYOPHILIZED, FOR SOLUTION INTRAVENOUS at 09:53

## 2017-05-19 RX ADMIN — SODIUM CHLORIDE, SODIUM LACTATE, POTASSIUM CHLORIDE, AND CALCIUM CHLORIDE: 600; 310; 30; 20 INJECTION, SOLUTION INTRAVENOUS at 07:57

## 2017-05-19 RX ADMIN — VECURONIUM BROMIDE FOR INJECTION 1 MG: 1 INJECTION, POWDER, LYOPHILIZED, FOR SOLUTION INTRAVENOUS at 11:14

## 2017-05-19 RX ADMIN — DOCUSATE SODIUM 100 MG: 100 CAPSULE, LIQUID FILLED ORAL at 21:10

## 2017-05-19 RX ADMIN — LIDOCAINE HYDROCHLORIDE 1 ML: 10 INJECTION, SOLUTION EPIDURAL; INFILTRATION; INTRACAUDAL; PERINEURAL at 07:58

## 2017-05-19 RX ADMIN — SODIUM CHLORIDE, SODIUM LACTATE, POTASSIUM CHLORIDE, AND CALCIUM CHLORIDE: 600; 310; 30; 20 INJECTION, SOLUTION INTRAVENOUS at 09:47

## 2017-05-19 RX ADMIN — KETOROLAC TROMETHAMINE 30 MG: 30 INJECTION, SOLUTION INTRAMUSCULAR at 11:21

## 2017-05-19 RX ADMIN — ONDANSETRON HYDROCHLORIDE 4 MG: 2 INJECTION, SOLUTION INTRAVENOUS at 11:21

## 2017-05-19 RX ADMIN — FENTANYL CITRATE 75 MCG: 50 INJECTION INTRAMUSCULAR; INTRAVENOUS at 09:51

## 2017-05-19 RX ADMIN — FENTANYL CITRATE 25 MCG: 50 INJECTION INTRAMUSCULAR; INTRAVENOUS at 10:13

## 2017-05-19 RX ADMIN — FAMOTIDINE 20 MG: 20 TABLET ORAL at 07:57

## 2017-05-19 RX ADMIN — VECURONIUM BROMIDE FOR INJECTION 2 MG: 1 INJECTION, POWDER, LYOPHILIZED, FOR SOLUTION INTRAVENOUS at 10:40

## 2017-05-19 RX ADMIN — PROPOFOL 140 MG: 10 INJECTION, EMULSION INTRAVENOUS at 09:53

## 2017-05-19 RX ADMIN — ONDANSETRON 4 MG: 2 INJECTION INTRAMUSCULAR; INTRAVENOUS at 15:19

## 2017-05-19 RX ADMIN — MIDAZOLAM HYDROCHLORIDE 2 MG: 1 INJECTION, SOLUTION INTRAMUSCULAR; INTRAVENOUS at 09:40

## 2017-05-19 RX ADMIN — HYDROMORPHONE HYDROCHLORIDE 0.5 MG: 1 INJECTION, SOLUTION INTRAMUSCULAR; INTRAVENOUS; SUBCUTANEOUS at 11:22

## 2017-05-19 ASSESSMENT — PAIN - FUNCTIONAL ASSESSMENT: PAIN_FUNCTIONAL_ASSESSMENT: 0-10

## 2017-05-20 VITALS
SYSTOLIC BLOOD PRESSURE: 109 MMHG | DIASTOLIC BLOOD PRESSURE: 65 MMHG | HEIGHT: 64 IN | OXYGEN SATURATION: 99 % | WEIGHT: 131 LBS | BODY MASS INDEX: 22.36 KG/M2 | RESPIRATION RATE: 16 BRPM | HEART RATE: 73 BPM | TEMPERATURE: 98 F

## 2017-05-20 PROCEDURE — 2580000003 HC RX 258: Performed by: OBSTETRICS & GYNECOLOGY

## 2017-05-20 PROCEDURE — G0378 HOSPITAL OBSERVATION PER HR: HCPCS

## 2017-05-20 PROCEDURE — 96374 THER/PROPH/DIAG INJ IV PUSH: CPT

## 2017-05-20 PROCEDURE — 6360000002 HC RX W HCPCS: Performed by: OBSTETRICS & GYNECOLOGY

## 2017-05-20 PROCEDURE — 6370000000 HC RX 637 (ALT 250 FOR IP): Performed by: OBSTETRICS & GYNECOLOGY

## 2017-05-20 RX ORDER — HYDROCODONE BITARTRATE AND ACETAMINOPHEN 5; 325 MG/1; MG/1
1 TABLET ORAL EVERY 6 HOURS PRN
Qty: 20 TABLET | Refills: 0 | Status: SHIPPED | OUTPATIENT
Start: 2017-05-20 | End: 2017-05-27

## 2017-05-20 RX ADMIN — DOCUSATE SODIUM 100 MG: 100 CAPSULE, LIQUID FILLED ORAL at 08:21

## 2017-05-20 RX ADMIN — Medication 10 ML: at 08:21

## 2017-05-20 RX ADMIN — KETOROLAC TROMETHAMINE 30 MG: 30 INJECTION, SOLUTION INTRAMUSCULAR at 08:21

## 2017-05-20 ASSESSMENT — PAIN SCALES - GENERAL: PAINLEVEL_OUTOF10: 2

## 2017-06-08 ENCOUNTER — TRANSCRIBE ORDERS (OUTPATIENT)
Dept: PHYSICAL THERAPY | Facility: HOSPITAL | Age: 42
End: 2017-06-08

## 2017-06-08 DIAGNOSIS — M25.511 RIGHT SHOULDER PAIN, UNSPECIFIED CHRONICITY: Primary | ICD-10-CM

## 2017-06-16 ENCOUNTER — HOSPITAL ENCOUNTER (OUTPATIENT)
Dept: PHYSICAL THERAPY | Facility: HOSPITAL | Age: 42
Setting detail: THERAPIES SERIES
Discharge: HOME | End: 2017-06-16

## 2017-06-16 DIAGNOSIS — M25.511 RIGHT SHOULDER PAIN, UNSPECIFIED CHRONICITY: Primary | ICD-10-CM

## 2017-06-16 PROCEDURE — 97162 PT EVAL MOD COMPLEX 30 MIN: CPT

## 2017-06-16 NOTE — THERAPY EVALUATION
Outpatient Physical Therapy Ortho Initial Evaluation   [place]     Patient Name: Vanesa Manuel  : 1975  MRN: [id number]  Today's Date: 2017      Visit Date: 2017    Patient Active Problem List   Diagnosis   • Breast cancer greater than or equal to 2 cm in greatest dimension        Past Medical History:   Diagnosis Date   • Anemia    • Breast cancer    • Cancer     breast        Past Surgical History:   Procedure Laterality Date   • BREAST BIOPSY Left    • BREAST SURGERY      biopsy   •  SECTION     •  SECTION     • OTHER SURGICAL HISTORY      took out expanders and placed implants   • WISDOM TOOTH EXTRACTION         Visit Dx:     ICD-10-CM ICD-9-CM   1. Right shoulder pain, unspecified chronicity M25.511 719.41             Patient History       17 1500          History    Chief Complaint Pain  -RS (r) FM (t) RS (c)      Type of Pain Shoulder pain;Upper Extremity / Arm  -RS (r) FM (t) RS (c)      Date Current Problem(s) Began --   a few months ago  -RS (r) FM (t) RS (c)      Brief Description of Current Complaint Patient reports that a few months ago there was an insidious onset of right shoulder pain.  She can point to the origin of pain, which is right above the spine of her R scapula.  She claims the pain goes down her RUE along the backside, but stops at the elbow.   -RS (r) FM (t) RS (c)      Previous treatment for THIS PROBLEM Medication   ibuprofen   -RS (r) FM (t) RS (c)      Onset Date- PT 17  -RS (r) FM (t) RS (c)      Patient/Caregiver Goals Relieve pain  -RS (r) FM (t) RS (c)      Current Tobacco Use Not Smoker  -RS (r) FM (t) RS (c)      Smoking Status Never smoker  -RS (r) FM (t) RS (c)      Hand Dominance right-handed  -RS (r) FM (t) RS (c)      Occupation/sports/leisure activities  at [employer]  -RS (r) FM (t) RS (c)      Patient seeing anyone else for problem(s)? Yes  -RS (r) FM (t) RS (c)      How has patient tried to help current problem?  heat, ice, ibuprofen  -RS (r) FM (t) RS (c)      What clinical tests have you had for this problem? --   none  -RS (r) FM (t) RS (c)      Related/Recent Hospitalizations No  -RS (r) FM (t) RS (c)      Are you or can you be pregnant No  -RS (r) FM (t) RS (c)      Pain     Pain Location Shoulder  -RS (r) FM (t) RS (c)      Pain at Present 2  -RS (r) FM (t) RS (c)      Pain at Best 0  -RS (r) FM (t) RS (c)      Pain at Worst 6  -RS (r) FM (t) RS (c)      López-Baker FACES Pain Rating  2  -RS (r) FM (t) RS (c)      Pain Frequency Intermittent  -RS (r) FM (t) RS (c)      Pain Description Shooting;Aching;Dull;Discomfort  -RS (r) FM (t) RS (c)      What Performance Factors Make the Current Problem(s) WORSE? Extreme shoulder internal rotation with extension - when she is putting on her bra.  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   -RS (r) FM (t) RS (c)      What Performance Factors Make the Current Problem(s) BETTER? Ibuprofen makes the pain better, but no certain motion decreases pain.   -RS (r) FM (t) RS (c)      Is your sleep disturbed? No  -RS (r) FM (t) RS (c)      Is medication used to assist with sleep? 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   -RS (r) FM (t) RS (c)      Fall Risk Assessment    Any falls in the past year: No  -RS (r) FM (t) RS (c)      Services    Prior Rehab/Home Health Experiences No  -RS (r) FM (t) RS (c)      Daily Activities    Primary Language English  -RS (r) FM (t) RS (c)      Are you able to read Yes  -RS (r) FM (t) RS (c)      Are you able to write Yes  -RS (r) FM (t) RS (c)      How does patient learn best? Demonstration  -RS (r) FM (t) RS (c)      Teaching needs identified Home Exercise Program;Management of Condition  -RS (r) FM (t) RS (c)      Patient is concerned about/has problems with Flexibility;Grasping objects lifting;Reaching over head;Repetitive movements of the hand, arm,  shoulder  -RS (r) FM (t) RS (c)      Does patient have problems with the following? None  -RS (r) FM (t) RS (c)      Barriers to learning None  -RS (r) FM (t) RS (c)      Pt Participated in POC and Goals Yes  -RS (r) FM (t) RS (c)      Safety    Are you being hurt, hit, or frightened by anyone at home or in your life? No  -RS (r) FM (t) RS (c)      Are you being neglected by a caregiver No  -RS (r) FM (t) RS (c)        User Key  (r) = Recorded By, (t) = Taken By, (c) = Cosigned By    Initials Name Provider Type    RS Nando Herron, PT DPT Physical Therapist    QUIRINO Case, PT Student PT Student                PT Ortho       06/16/17 1600    Precautions and Contraindications    Precautions/Limitations no known precautions/limitations  -RS (r) FM (t) RS (c)    Posture/Observations    Alignment Options Thoracic kyphosis;Rounded shoulders  -RS    Thoracic Kyphosis Bilateral:;Decreased;Sitting posture   upper   -RS    Rounded Shoulders Bilateral:;Mild;Increased  -RS    Posture/Observations Comments Patient demonstrates decreased thoracic kyphosis and downwardly rotated scapulae.    -RS (r) FM (t) RS (c)    Sensation    Sensation WNL? WNL  -RS (r) FM (t) RS (c)    Light Touch No apparent deficits  -RS (r) FM (t) RS (c)    Quarter Clearing    Quarter Clearing Upper Quarter Clearing  -RS (r) FM (t) RS (c)    DTR- Upper Quarter Clearing    Biceps (C5/6) Bilateral:;2- Normal response  -RS (r) FM (t) RS (c)    Brachioradialis (C6) Bilateral:;2- Normal response  -RS (r) FM (t) RS (c)    Triceps (C7) Bilateral:;2- Normal response  -RS (r) FM (t) RS (c)    Neural Tension Signs- Upper Quarter Clearing    ULNTT 1 Right:;Postive  -RS (r) FM (t) RS (c)    ULNTT 2 Right:;Postive  -RS (r) FM (t) RS (c)    Sensory Screen for Light Touch- Upper Quarter Clearing    C4 (posterior shoulder) Bilateral:;Intact  -RS (r) FM (t) RS (c)    C5 (lateral upper arm) Bilateral:;Intact  -RS (r) FM (t) RS (c)    C6 (tip of thumb)  Bilateral:;Intact  -RS (r) FM (t) RS (c)    C7 (tip of 3rd finger) Bilateral:;Intact  -RS (r) FM (t) RS (c)    C8 (tip of 5th finger) Bilateral:;Intact  -RS (r) FM (t) RS (c)    T1 (medial lower arm) Bilateral:;Intact  -RS (r) FM (t) RS (c)    Myotomal Screen- Upper Quarter Clearing    Shoulder flexion (C5) Bilateral:;4- (Good -)  -RS (r) FM (t) RS (c)    Elbow flexion/wrist extension (C6) Bilateral:;WNL  -RS (r) FM (t) RS (c)    Elbow extension/wrist flexion (C7) Bilateral:;WNL  -RS (r) FM (t) RS (c)    Finger flexion/ (C8) Bilateral:;WNL  -RS (r) FM (t) RS (c)    Finger abduction (T1) Bilateral:;WNL  -RS (r) FM (t) RS (c)     Bilateral:;WNL  -RS (r) FM (t) RS (c)    Cervical/Shoulder ROM Screen    Cervical flexion Normal  -RS (r) FM (t) RS (c)    Cervical extension Normal  -RS (r) FM (t) RS (c)    Cervical lateral flexion Normal  -RS (r) FM (t) RS (c)    Cervical rotation Normal  -RS (r) FM (t) RS (c)    Cervical quadrant (Spurling's) Normal  -RS (r) FM (t) RS (c)    Shoulder elevation  Normal  -RS (r) FM (t) RS (c)    Special Tests/Palpation    Special Tests/Palpation Cervical/Thoracic;Shoulder  -RS (r) FM (t) RS (c)    Cervical/Thoracic Special Tests    Spurlings (Foraminal Compression) Bilateral:;Negative  -RS (r) FM (t) RS (c)    Upper Level Neural Tension Tests    Median Neural Tension Test Right:;Positive  -RS (r) FM (t) RS (c)    Shoulder Girdle Palpation    Supraspinatus Insertion Right:;Tender  -RS    Teres Minor Right:;Tender  -RS    Pect Minor Right:;Tender;Guarded/taut  -RS    Shoulder Girdle Palpation? Yes  -RS (r) FM (t) RS (c)    Shoulder Girdle Accessory Motions    Shoulder Girdle Accessory Motions Tested? Yes  -RS (r) FM (t) RS (c)    Posterior glide of humerus Right:;Hypomobile  -RS (r) FM (t) RS (c)    Anterior glide of humerus Right:;Hypermobile  -RS (r) FM (t) RS (c)    Protraction mobility of scapula Right:;WNL  -RS (r) FM (t) RS (c)    Retraction mobility of scapula Right:;Hypomobile   -RS (r) FM (t) RS (c)    Upward rotation mobility of scapula Right:;Hypomobile  -RS (r) FM (t) RS (c)    Shoulder Impingement/Rotator Cuff Special Tests    Wynn-French Test (RC Lesion vs. Bursitis) Right:;Negative  -RS (r) FM (t) RS (c)    Full Can Test (RC Lesion) Right:;Negative  -RS    Drop Arm Test (Full Thickness RC Lesion) Bilateral:;Negative  -RS (r) FM (t) RS (c)    Internal Impingement Sign Right:;Negative  -RS    Eda's Test, Dynamic (Subacromial Impingement) Right:;Positive  -RS    Shoulder Laxity/Instability Special Tests    Posterior Apprehension Test Right:;Negative   laxity noted  -RS    Anterior Apprehension/Relocation Test, at 90 Degrees Right:;Negative  -RS (r) FM (t) RS (c)    Horizontal Adduction Test (AC Joint Pain) Right:;Negative  -RS (r) FM (t) RS (c)    Scapular Special Tests    Scapular Assistance Test (Scapular Dyskinesia) Right:;Positive  -RS (r) FM (t) RS (c)    Scapular Retraction Test (Scapular Dyskinesia) Right:;Positive  -RS (r) FM (t) RS (c)    ROM (Range of Motion)    General ROM upper extremity range of motion deficits identified  -RS (r) FM (t) RS (c)    Right Shoulder    Flexion AROM other (see comments)   123  -RS (r) FM (t) RS (c)    External Rotation AROM other (see comments)   Excessive ER  -RS (r) FM (t) RS (c)    Right Scapula    ADduction AROM restricted  -RS (r) FM (t) RS (c)    General UE Assessment    ROM shoulder, right: UE ROM deficit;scapula, right: UE ROM deficit  -RS (r) FM (t) RS (c)    MMT (Manual Muscle Testing)    General MMT Assessment no strength deficits identified  -RS (r) FM (t) RS (c)    Pathomechanics    Upper Extremity Pathomechanics Limited scapular upward rotation;Excessive glenohumeral external rotation;Excessive abduction/internal rotation with reaching  -RS    Pathomechanics Comments Scapulae appear to be winging but this is due to the flattend thoracic spine.  Increased anterior tilting is noted bilaterally   -RS      User Key  (r) =  Recorded By, (t) = Taken By, (c) = Cosigned By    Initials Name Provider Type    RS Nando Herron, PT DPT Physical Therapist    QUIRINO Case, PT Student PT Student                            Therapy Education       06/16/17 1600          Therapy Education    Given HEP  -RS (r) FM (t) RS (c)      Program New   scapular adduction in sitting 3x10  -RS (r) FM (t) RS (c)      How Provided Verbal;Demonstration;Written  -RS (r) FM (t) RS (c)      Provided to Patient  -RS (r) FM (t) RS (c)      Level of Understanding Verbalized;Demonstrated  -RS (r) FM (t) RS (c)        User Key  (r) = Recorded By, (t) = Taken By, (c) = Cosigned By    Initials Name Provider Type    RS Nando Herron, PT DPT Physical Therapist    QUIRINO Case, PT Student PT Student                PT OP Goals       06/16/17 1652 06/16/17 1600    PT Short Term Goals    STG Date to Achieve  06/30/17  -RS (r) FM (t) RS (c)    STG 1  Patient will report no right shoulder pain above a 2/10 for 2 weeks.   -RS    STG 1 Progress  New  -RS (r) FM (t) RS (c)    STG 2  Patient will demonstrate bilateral neutral scapular resting posture avoiding downward rotation  -RS    STG 2 Progress  New  -RS (r) FM (t) RS (c)    Long Term Goals    LTG Date to Achieve  07/16/17  -RS (r) FM (t) RS (c)    LTG 1  Patient will be independent with a comprehensive HEP by the time of discharge.  -RS (r) FM (t) RS (c)    LTG 1 Progress  New  -RS (r) FM (t) RS (c)    LTG 2  Patient will work a full shift at work with no complaints of right shoulder pain or radicular pain down the right arm by discharge.  -RS    LTG 2 Progress  New  -RS (r) FM (t) RS (c)    LTG 3  Patient will demonstrate (R) shoulder global strength to at least 4+/5 on MMT by discharge.  -RS    LTG 3 Progress  New  -RS (r) FM (t) RS (c)    LTG 4  Patient will demonstrate no right UE radicular pain for at least one week by discharge.  -RS    LTG 4 Progress  New  -RS    LTG 5  Patient will report (R)  shoulder symptoms <3/10 with functional reaching including putting on a bra, by discharge.  -RS    LTG 5 Progress  New  -RS    Time Calculation    PT Goal Re-Cert Due Date 07/16/17  -RS 07/16/17  -RS      User Key  (r) = Recorded By, (t) = Taken By, (c) = Cosigned By    Initials Name Provider Type    RS Nando Herron, PT DPT Physical Therapist    FM Martine Case, PT Student PT Student                PT Assessment/Plan       06/16/17 1600       PT Assessment    Functional Limitations Performance in work activities;Performance in self-care ADL;Performance in leisure activities  -RS     Impairments Pain;Muscle strength;Joint mobility;Posture;Range of motion  -RS     Assessment Comments Patient presented today with a chief concern of shoulder pain that radiates down alongside the posterior aspect of the RUE that does not extend below the elbow.  The patient did not have any impairments with light touch nor reflex testing.  Patient also tested negative in the cervical quadrant clearing exam.  The patient does exhibit a spine with a neutralized kyphosis that can be modified with verbal cueing.  The patient's scapulae bilaterally are abducted and downwardly rotated.  At this time, the patient's symptoms are consistent with right humeral anterior glide.  When the R scapula is stabilized into retraction, full movement and strength is achieved with shoulder movement.  Examination today did not reveal any obivous source of the symptoms.  Joint irritation is highly likely.  Underlying mechanism of poor proximal stability has led to cuff over recruitment with relative joint hypermobility.  As always, we appreciate your referral to us and we look forward to working with this patient.   -RS     Please refer to paper survey for additional self-reported information Yes  -RS (r) FM (t) RS (c)     Rehab Potential Excellent  -RS (r) FM (t) RS (c)     Patient/caregiver participated in establishment of treatment plan and goals  Yes  -RS (r) FM (t) RS (c)     Patient would benefit from skilled therapy intervention Yes  -RS (r) FM (t) RS (c)     PT Plan    PT Frequency 2x/week  -RS (r) FM (t) RS (c)     Predicted Duration of Therapy Intervention (days/wks) 4-6 weeks  -RS (r) FM (t) RS (c)     Planned CPT's? PT EVAL LOW COMPLEXITY: 14968;PT THER PROC EA 15 MIN: 74024;PT THER ACT EA 15 MIN: 31818;PT MANUAL THERAPY EA 15 MIN: 80764;PT NEUROMUSC RE-EDUCATION EA 15 MIN: 76956;PT ELECTRICAL STIM UNATTEND: ;PT ELECTRICAL STIM ATTD EA 15 MIN: 26444  -RS (r) FM (t) RS (c)     Physical Therapy Interventions (Optional Details) home exercise program;joint mobilization;manual therapy techniques;modalities;postural re-education;ROM (Range of Motion);strengthening;taping;patient/family education;stretching  -RS     PT Plan Comments We will work on decreasing any muscle guarding or tightness in the soft tissue in the axilla (teres major).  From there, work to strengthen the scapular stabilizers.  We will also work on the neural tension, as the scratch test and median ULNT were both positive.   -RS       User Key  (r) = Recorded By, (t) = Taken By, (c) = Cosigned By    Initials Name Provider Type    RS Nando Herron, PT DPT Physical Therapist    FM Martine Case, PT Student PT Student                                    Time Calculation:   Start Time: 1532  Stop Time: 1632  Time Calculation (min): 60 min  Total Timed Code Minutes- PT: 0 minute(s)     Therapy Charges for Today     Code Description Service Date Service Provider Modifiers Qty    [id number]  PT EVAL MOD COMPLEXITY 4 6/16/2017 Nando Herron, PT DPT GP 1                    JOSE MIGUEL Herron, PT DPT  6/16/2017

## 2017-06-22 DIAGNOSIS — C50.919 MALIGNANT NEOPLASM OF OTHER SPECIFIED SITES OF FEMALE BREAST: Primary | ICD-10-CM

## 2017-06-23 ENCOUNTER — OFFICE VISIT (OUTPATIENT)
Dept: ONCOLOGY | Facility: CLINIC | Age: 42
End: 2017-06-23

## 2017-06-23 ENCOUNTER — LAB (OUTPATIENT)
Dept: ONCOLOGY | Facility: CLINIC | Age: 42
End: 2017-06-23

## 2017-06-23 VITALS
HEIGHT: 63 IN | DIASTOLIC BLOOD PRESSURE: 82 MMHG | TEMPERATURE: 97.6 F | BODY MASS INDEX: 23.12 KG/M2 | SYSTOLIC BLOOD PRESSURE: 122 MMHG | RESPIRATION RATE: 16 BRPM | WEIGHT: 130.5 LBS | OXYGEN SATURATION: 99 % | HEART RATE: 80 BPM

## 2017-06-23 DIAGNOSIS — C50.919: Primary | ICD-10-CM

## 2017-06-23 LAB
ALBUMIN SERPL-MCNC: 4.4 G/DL (ref 3.5–5)
ALBUMIN/GLOB SERPL: 1.3 G/DL
ALP SERPL-CCNC: 38 U/L (ref 38–126)
ALT SERPL W P-5'-P-CCNC: 22 U/L (ref 9–52)
ANION GAP SERPL CALCULATED.3IONS-SCNC: 11 MMOL/L
AST SERPL-CCNC: 26 U/L (ref 5–40)
AUTO MIXED CELLS #: 0.3 10*3/UL (ref 0.1–1.5)
AUTO MIXED CELLS %: 5.9 % (ref 0.2–15.1)
BILIRUB SERPL-MCNC: 0.9 MG/DL (ref 0.2–1.3)
BUN BLD-MCNC: 11 MG/DL (ref 7–26)
BUN/CREAT SERPL: 15.7 (ref 7–25)
CALCIUM SPEC-SCNC: 9.2 MG/DL (ref 8.4–10.2)
CHLORIDE SERPL-SCNC: 106 MMOL/L (ref 98–107)
CO2 SERPL-SCNC: 27 MMOL/L (ref 22–30)
CREAT BLD-MCNC: 0.7 MG/DL (ref 0.7–1.4)
ERYTHROCYTE [DISTWIDTH] IN BLOOD BY AUTOMATED COUNT: 14 % (ref 11.5–14.5)
GFR SERPL CREATININE-BSD FRML MDRD: 93 ML/MIN/1.73
GLOBULIN UR ELPH-MCNC: 3.3 GM/DL
GLUCOSE BLD-MCNC: 126 MG/DL (ref 75–110)
HCT VFR BLD AUTO: 33.7 % (ref 37–47)
HGB BLD-MCNC: 10.6 G/DL (ref 12–16)
LYMPHOCYTES # BLD AUTO: 1.5 10*3/MM3 (ref 0.8–7)
LYMPHOCYTES NFR BLD AUTO: 29.2 % (ref 10–58.5)
MCH RBC QN AUTO: 28.5 PG (ref 27–31)
MCHC RBC AUTO-ENTMCNC: 31.5 G/DL (ref 33–37)
MCV RBC AUTO: 90.7 FL (ref 81–99)
NEUTROPHILS # BLD AUTO: 3.4 10*3/MM3 (ref 2–7.8)
NEUTROPHILS NFR BLD AUTO: 64.9 % (ref 37–92)
PLATELET # BLD AUTO: 166 10*3/MM3 (ref 130–400)
PMV BLD AUTO: 9.6 FL (ref 6–12)
POTASSIUM BLD-SCNC: 3.5 MMOL/L (ref 3.6–5)
PROT SERPL-MCNC: 7.7 G/DL (ref 6.3–8.2)
RBC # BLD AUTO: 3.72 10*6/MM3 (ref 4.2–5.4)
SODIUM BLD-SCNC: 144 MMOL/L (ref 137–145)
WBC NRBC COR # BLD: 5.3 10*3/MM3 (ref 4.8–10.8)

## 2017-06-23 PROCEDURE — 99213 OFFICE O/P EST LOW 20 MIN: CPT | Performed by: INTERNAL MEDICINE

## 2017-06-23 PROCEDURE — 85025 COMPLETE CBC W/AUTO DIFF WBC: CPT | Performed by: INTERNAL MEDICINE

## 2017-06-23 PROCEDURE — 80053 COMPREHEN METABOLIC PANEL: CPT | Performed by: INTERNAL MEDICINE

## 2017-06-23 RX ORDER — HYDROCODONE BITARTRATE AND ACETAMINOPHEN 5; 325 MG/1; MG/1
TABLET ORAL
Refills: 0 | COMMUNITY
Start: 2017-05-20 | End: 2017-08-19

## 2017-06-23 NOTE — PROGRESS NOTES
[facility]  HEMATOLOGY & ONCOLOGY        Subjective     VISIT DIAGNOSIS:   No diagnosis found.    REASON FOR VISIT:   No chief complaint on file.       HEMATOLOGY / ONCOLOGY HISTORY: Ms. Manuel is a 40 year old female patient, stage IIA breast carcinoma for which she underwent neoadjuvant chemotherapy with four  cycles of Adriamycin, Cytoxan followed by 12 weekly courses of Taxol. She did have ER positive, OH positive, HER2/  deepika negative  disease and her BRCA testing was also negative. She underwent bilateral mastectomies, this was followed by reconstruction. She has  also been on Tamoxifen for hormonal manipulation since September 2014 and tolerating it well.   No history exists.       Cancer Staging Information:  No matching staging information was found for the patient.      INTERVAL HISTORY  Patient ID: Vanesa Maneul is a 42 y.o. year old female         Review of Systems   Constitutional: Negative.    HENT: Negative.    Eyes: Negative.    Respiratory: Negative.    Cardiovascular: Negative.    Gastrointestinal: Negative.    Endocrine: Negative.    Genitourinary: Negative.    Musculoskeletal: Negative.    Skin: Negative.    Allergic/Immunologic: Negative.    Neurological: Negative.    Hematological: Negative.    Psychiatric/Behavioral: Negative.             Medications:    Current Outpatient Prescriptions   Medication Sig Dispense Refill   • clotrimazole (LOTRIMIN) 1 % cream APPLY AS DIRECTED TWICE A DAY  1   • mupirocin (BACTROBAN) 2 % ointment Apply  topically 2 (Two) Times a Day. 22 g 0   • tamoxifen (NOLVADEX) 20 MG chemo tablet Take  by mouth Daily.     • venlafaxine (EFFEXOR) 25 MG tablet Take 25 mg by mouth 2 (Two) Times a Day.     • venlafaxine XR (EFFEXOR-XR) 75 MG 24 hr capsule        No current facility-administered medications for this visit.        ALLERGIES:    Allergies   Allergen Reactions   • Percocet [Oxycodone-Acetaminophen]        Objective      @VITALS    Current Status  4/25/2017   ECOG score 0       General Appearance: Patient is awake, alert, oriented and in no acute distress. Patient is welldeveloped, wellnourished, and appears stated age.  HEENT: Normocephalic. Sclerae clear, conjunctiva pink, extraocular movements intact, pupils, round, reactive to light and  accommodation. Mouth and throat are clear with moist oral mucosa.  NECK: Supple, no jugular venous distention, thyroid not enlarged.  LYMPH: No cervical, supraclavicular, axillary, or inguinal lymphadenopathy.  CHEST: Equal bilateral expansion, AP  diameter normal, resonant percussion note  LUNGS: Good air movement, no rales, rhonchi, rubs or wheezes with auscultation  CARDIO: Regular sinus rhythm, no murmurs, gallops or rubs.  ABDOMEN: Nondistended, soft, No tenderness, no guarding, no rebound, No hepatosplenomegaly. No abdominal masses. Bowel sounds positive. No hernia  GENITALIA: Not examined.  BREASTS: Not examined.  MUSKEL: No joint swelling, decreased motion, or inflammation  EXTREMS: No edema, clubbing, cyanosis, No varicose veins.  NEURO: Grossly nonfocal, Gait is coordinated and smooth, Cognition is preserved.  SKIN: No rashes, no ecchymoses, no petechia.  PSYCH: Oriented to time, place and person. Memory is preserved. Mood and affect appear normal      RECENT LABS:  No visits with results within 7 Day(s) from this visit.  Latest known visit with results is:    Orders Only on 04/25/2017   Component Date Value Ref Range Status   • Glucose 04/25/2017 157* 75 - 110 mg/dL Final   • BUN 04/25/2017 15  7 - 26 mg/dL Final   • Creatinine 04/25/2017 0.70  0.70 - 1.40 mg/dL Final   • Sodium 04/25/2017 142  137 - 145 mmol/L Final   • Potassium 04/25/2017 4.1  3.6 - 5.0 mmol/L Final   • Chloride 04/25/2017 106  98 - 107 mmol/L Final   • CO2 04/25/2017 27.0  22.0 - 30.0 mmol/L Final   • Calcium 04/25/2017 9.0  8.4 - 10.2 mg/dL Final   • Total Protein 04/25/2017 7.4  6.3 - 8.2 g/dL Final   • Albumin 04/25/2017 4.20  3.50 -  5.00 g/dL Final   • ALT (SGPT) 04/25/2017 25  9 - 52 U/L Final   • AST (SGOT) 04/25/2017 39  5 - 40 U/L Final   • Alkaline Phosphatase 04/25/2017 41  38 - 126 U/L Final   • Total Bilirubin 04/25/2017 0.5  0.2 - 1.3 mg/dL Final   • eGFR Non African Amer 04/25/2017 93  >60 mL/min/1.73 Final   • Globulin 04/25/2017 3.2  gm/dL Final   • A/G Ratio 04/25/2017 1.3  g/dL Final   • BUN/Creatinine Ratio 04/25/2017 21.4  7.0 - 25.0 Final    Unable to calculate Bun/Crea Ratio.   • Anion Gap 04/25/2017 9.0  mmol/L Final   • WBC 04/25/2017 7.80  4.80 - 10.80 10*3/mm3 Final   • RBC 04/25/2017 3.90* 4.20 - 5.40 10*6/mm3 Final   • Hemoglobin 04/25/2017 11.2* 12.0 - 16.0 g/dL Final   • Hematocrit 04/25/2017 35.5* 37.0 - 47.0 % Final   • MCV 04/25/2017 91.1  81.0 - 99.0 fL Final   • MCH 04/25/2017 28.7  27.0 - 31.0 pg Final   • MCHC 04/25/2017 31.5* 33.0 - 37.0 g/dL Final   • RDW 04/25/2017 12.6  11.5 - 14.5 % Final   • MPV 04/25/2017 8.6  6.0 - 12.0 fL Final   • Platelets 04/25/2017 254  130 - 400 10*3/mm3 Final   • Neutrophil % 04/25/2017 65.3  37.0 - 92.0 % Final   • Lymphocyte % 04/25/2017 28.3  10.0 - 58.5 % Final   • Auto Mixed Cells % 04/25/2017 6.4  0.2 - 15.1 % Final   • Neutrophils, Absolute 04/25/2017 5.10  2.00 - 7.80 10*3/mm3 Final   • Lymphocytes, Absolute 04/25/2017 2.20  0.80 - 7.00 10*3/mm3 Final   • Auto Mixed Cells # 04/25/2017 0.50  0.10 - 1.50 10*3/uL Final       RADIOLOGY:  No results found.         Assessment/Plan      Ms. Manuel is a 40 year old female patient, stage IIA breast carcinoma 2014, for which she underwent neoadjuvant chemotherapy with four  cycles of Adriamycin, Cytoxan followed by 12 weekly courses of Taxol. She did have ER positive, ID positive, HER2/  deepika negative  disease and her BRCA testing was also negative. She underwent bilateral mastectomies, this was followed by reconstruction. She has  also been on Tamoxifen.  She is Pre menupausal and must take Gosrelin or Lupron with Tamoxifen for  Ovarian supression, unless she opts for HSO for which I have encouraged her to talk to her OB.  Her estradiol levels drawn back in November 30 were on the higher side at 75.  Of course she is premenopausal and I need to treat her with Lupron or Gosrelin to stop the ovaries from estrogen production unless she decides to get a hysterosalpinoopherectomy, for this she is trying to find a gynecologist.      She underwent HSO 3 weeks ago and is doing fine.  Therefore she will not require Gosrelin.  In about 3 months time I will switch her over from tamoxifen to an aromatase inhibitors, now that she become postmenopausal.        Keagan Lindsey MD    6/23/2017    3:06 PM

## 2017-06-29 ENCOUNTER — HOSPITAL ENCOUNTER (OUTPATIENT)
Dept: PHYSICAL THERAPY | Facility: HOSPITAL | Age: 42
Setting detail: THERAPIES SERIES
Discharge: HOME | End: 2017-06-29

## 2017-06-29 DIAGNOSIS — M25.511 RIGHT SHOULDER PAIN, UNSPECIFIED CHRONICITY: Primary | ICD-10-CM

## 2017-06-29 PROCEDURE — 97140 MANUAL THERAPY 1/> REGIONS: CPT

## 2017-06-29 NOTE — THERAPY TREATMENT NOTE
Outpatient Physical Therapy Ortho Treatment Note   [facility]     Patient Name: Vanesa Manuel  : 1975  MRN: [id number]  Today's Date: 2017      Visit Date: 2017    Visit Dx:    ICD-10-CM ICD-9-CM   1. Right shoulder pain, unspecified chronicity M25.511 719.41       Patient Active Problem List   Diagnosis   • Breast cancer greater than or equal to 2 cm in greatest dimension        Past Medical History:   Diagnosis Date   • Anemia    • Breast cancer    • Cancer     breast        Past Surgical History:   Procedure Laterality Date   • BREAST BIOPSY Left    • BREAST SURGERY      biopsy   •  SECTION     •  SECTION     • OTHER SURGICAL HISTORY      took out expanders and placed implants   • WISDOM TOOTH EXTRACTION                               PT Assessment/Plan       17 1537       PT Assessment    Assessment Comments Patient reports no pain or problems with R shoulder since last visit. Pt reports that occasionaly it will be uncomfortable while sleeping but would not call it pain. Pt scapula continues to be abducted and downwardly rotated which was addresed through manual mobilizations today. Pt had minimal gaurding through teres major and some tenderness. Also focused on posterior glides of the R shoudler to improve R humeral anterior glide. Also educated on proper sitting posture   -TR     PT Plan    PT Plan Comments Continue to work on mobilizing the scapula and address neural tension.   -TR       User Key  (r) = Recorded By, (t) = Taken By, (c) = Cosigned By    Initials Name Provider Type    MERLE Recinos PTA Physical Therapy Assistant                    Exercises       17 9870          Subjective Comments    Subjective Comments Pt reports no pain since inital eval almost 2 weeks ago and reports that she had not reallyhad any problems with it other than when she sleeps at night  -TR      Subjective Pain    Able to rate subjective pain? yes  -TR       Pre-Treatment Pain Level 0  -TR      Post-Treatment Pain Level 0  -TR      Exercise 1    Exercise Name 1 --  -TR        User Key  (r) = Recorded By, (t) = Taken By, (c) = Cosigned By    Initials Name Provider Type    MERLE Recinos PTA Physical Therapy Assistant                        Manual Rx (last 36 hours)      Manual Treatments       06/29/17 1537          Manual Rx 1    Manual Rx 1 Location R teres major (axilla)  -TR      Manual Rx 1 Type STM   -TR      Manual Rx 1 Grade min-mod   -TR      Manual Rx 1 Duration 15  -TR      Manual Rx 2    Manual Rx 2 Location R scapula  -TR      Manual Rx 2 Type upward rotation mobilization  -TR      Manual Rx 2 Grade 2  -TR      Manual Rx 2 Duration 12  -TR      Manual Rx 3    Manual Rx 3 Location R GHJ   -TR      Manual Rx 3 Type posterior mobilizations   -TR      Manual Rx 3 Grade 2  -TR      Manual Rx 3 Duration 12  -TR        User Key  (r) = Recorded By, (t) = Taken By, (c) = Cosigned By    Initials Name Provider Type    MERLE Recinos PTA Physical Therapy Assistant                PT OP Goals       06/29/17 1537       PT Short Term Goals    STG Date to Achieve 06/30/17  -TR     STG 1 Patient will report no right shoulder pain above a 2/10 for 2 weeks.   -TR     STG 1 Progress Ongoing  -TR     STG 1 Progress Comments Pt reports no pain since last visit   -TR     STG 2 Patient will demonstrate bilateral neutral scapular resting posture avoiding downward rotation  -TR     STG 2 Progress Ongoing  -TR     Long Term Goals    LTG Date to Achieve 07/16/17  -TR     LTG 1 Patient will be independent with a comprehensive HEP by the time of discharge.  -TR     LTG 1 Progress Ongoing  -TR     LTG 2 Patient will work a full shift at work with no complaints of right shoulder pain or radicular pain down the right arm by discharge.  -TR     LTG 2 Progress Ongoing  -TR     LTG 2 Progress Comments reports wotking multiple shifts at work with no pain   -TR     LTG 3 Patient  will demonstrate (R) shoulder global strength to at least 4+/5 on MMT by discharge.  -TR     LTG 3 Progress Ongoing  -TR     LTG 4 Patient will demonstrate no right UE radicular pain for at least one week by discharge.  -TR     LTG 4 Progress Ongoing  -TR     LTG 4 Progress Comments none since last visit   -TR     LTG 5 Patient will report (R) shoulder symptoms <3/10 with functional reaching including putting on a bra, by discharge.  -TR     LTG 5 Progress Ongoing  -TR     LTG 5 Progress Comments 0/10 since last visit   -TR     Time Calculation    PT Goal Re-Cert Due Date 07/16/17  -TR       User Key  (r) = Recorded By, (t) = Taken By, (c) = Cosigned By    Initials Name Provider Type    MERLE Recinos PTA Physical Therapy Assistant                Therapy Education       06/29/17 1537          Therapy Education    Given Posture/body mechanics  -TR      Program Reinforced  -TR      How Provided Verbal;Demonstration  -TR      Provided to Patient  -TR      Level of Understanding Verbalized;Demonstrated  -TR        User Key  (r) = Recorded By, (t) = Taken By, (c) = Cosigned By    Initials Name Provider Type    MERLE Recinos PTA Physical Therapy Assistant                Time Calculation:   Start Time: 1537  Stop Time: 1619  Time Calculation (min): 42 min  Total Timed Code Minutes- PT: 42 minute(s)    Therapy Charges for Today     Code Description Service Date Service Provider Modifiers Qty    30791418410 HC PT MANUAL THERAPY EA 15 MIN 6/29/2017 Lissette Recinos PTA GP 3                    Lissette Recinos PTA  6/29/2017

## 2017-07-06 ENCOUNTER — HOSPITAL ENCOUNTER (OUTPATIENT)
Dept: PHYSICAL THERAPY | Facility: HOSPITAL | Age: 42
Setting detail: THERAPIES SERIES
Discharge: HOME | End: 2017-07-06

## 2017-07-06 PROCEDURE — 97140 MANUAL THERAPY 1/> REGIONS: CPT

## 2017-07-06 PROCEDURE — 97110 THERAPEUTIC EXERCISES: CPT

## 2017-07-06 NOTE — THERAPY TREATMENT NOTE
Outpatient Physical Therapy Ortho Treatment Note   [place]     Patient Name: Vanesa Manuel  : 1975  MRN: [id number]  Today's Date: 2017      Visit Date: 2017    Visit Dx:  No diagnosis found.    Patient Active Problem List   Diagnosis   • Breast cancer greater than or equal to 2 cm in greatest dimension        Past Medical History:   Diagnosis Date   • Anemia    • Breast cancer    • Cancer     breast        Past Surgical History:   Procedure Laterality Date   • BREAST BIOPSY Left    • BREAST SURGERY      biopsy   •  SECTION     •  SECTION     • OTHER SURGICAL HISTORY      took out expanders and placed implants   • WISDOM TOOTH EXTRACTION                               PT Assessment/Plan       17 1710       PT Assessment    Assessment Comments She continues to have pain at night when she sleeps on it but I made some suggestions on how to position for comfort and we will follow up on her symptom response. Her B scapulae wing significantly and we will need to continue to address this as well.  -EC     PT Plan    PT Plan Comments Continue scapular mobilizations, work to strengthen her scapular stabilizers and shoulder complex.  -EC       User Key  (r) = Recorded By, (t) = Taken By, (c) = Cosigned By    Initials Name Provider Type    EC Kamari Bond PTA Physical Therapy Assistant                    Exercises       17 1500          Subjective Comments    Subjective Comments Pt continues to   -EC      Subjective Pain    Able to rate subjective pain? yes  -EC      Pre-Treatment Pain Level 1  -EC      Post-Treatment Pain Level 0  -EC      Exercise 1    Exercise Name 1 manual L pec stretches  -EC      Reps 1 5  -EC      Time (Seconds) 1 30  -EC      Exercise 2    Exercise Name 2 B unilateral hooklying serratus punches  -EC      Equipment 2 Dumbell  -EC      Weights/Plates 2 2  -EC      Reps 2 20  -EC      Exercise 3    Exercise Name 3 supine L shoulder isometric flexion  holds at 40, 90, 120 degrees with manual perturbations  -EC      Equipment 3 Dumbell  -EC      Weights/Plates 3 1  -EC      Sets 3 2  -EC      Time (Seconds) 3 30   each  -EC      Exercise 4    Exercise Name 4 supine L shoulder ER/IR isometric holds with manual perturbations arm aat 90/90   -EC      Equipment 4 Dumbell  -EC      Weights/Plates 4 1  -EC      Sets 4 3  -EC      Time (Seconds) 4 30  -EC        User Key  (r) = Recorded By, (t) = Taken By, (c) = Cosigned By    Initials Name Provider Type    EC Kamari Bond PTA Physical Therapy Assistant              Cervical Protocol Ex       07/06/17 1600          Foam/towel Roll - Posture Stretch    Cueing Verbal;Tactile  -EC      Equipment --   towel roll  -EC      Time (minutes) 5   progressive  -EC        User Key  (r) = Recorded By, (t) = Taken By, (c) = Cosigned By    Initials Name Provider Type    EC Kamari Bond PTA Physical Therapy Assistant                      Manual Rx (last 36 hours)      Manual Treatments       07/06/17 1500          Manual Rx 1    Manual Rx 1 Location thoracic extension mobilizations in massage chair  -EC      Manual Rx 1 Grade 2 and 3  -EC      Manual Rx 1 Duration 7  -EC      Manual Rx 2    Manual Rx 2 Location R scapula  -EC      Manual Rx 2 Type upward rotation mobilization  -EC      Manual Rx 2 Grade 2  -EC      Manual Rx 2 Duration 10  -EC        User Key  (r) = Recorded By, (t) = Taken By, (c) = Cosigned By    Initials Name Provider Type    EC Kamari Bond PTA Physical Therapy Assistant                PT OP Goals       07/06/17 1545       PT Short Term Goals    STG Date to Achieve 06/30/17  -EC     STG 1 Patient will report no right shoulder pain above a 2/10 for 2 weeks.   -EC     STG 1 Progress Ongoing  -EC     STG 2 Patient will demonstrate bilateral neutral scapular resting posture avoiding downward rotation  -EC     STG 2 Progress Ongoing  -EC     Long Term Goals    LTG Date to Achieve 07/16/17  -EC     LTG 1  Patient will be independent with a comprehensive HEP by the time of discharge.  -EC     LTG 1 Progress Ongoing  -EC     LTG 1 Progress Comments added towel roll thoracic stretches today  -EC     LTG 2 Patient will work a full shift at work with no complaints of right shoulder pain or radicular pain down the right arm by discharge.  -EC     LTG 2 Progress Ongoing  -EC     LTG 3 Patient will demonstrate (R) shoulder global strength to at least 4+/5 on MMT by discharge.  -EC     LTG 3 Progress Ongoing  -EC     LTG 4 Patient will demonstrate no right UE radicular pain for at least one week by discharge.  -EC     LTG 4 Progress Ongoing  -EC     LTG 5 Patient will report (R) shoulder symptoms <3/10 with functional reaching including putting on a bra, by discharge.  -EC     LTG 5 Progress Ongoing  -EC     Time Calculation    PT Goal Re-Cert Due Date 07/16/17  -EC       User Key  (r) = Recorded By, (t) = Taken By, (c) = Cosigned By    Initials Name Provider Type    SUSANNE Bond PTA Physical Therapy Assistant                Therapy Education       07/06/17 1710          Therapy Education    Given HEP  -EC      Program New   towel roll stretch  -EC      How Provided Verbal  -EC      Provided to Patient  -EC      Level of Understanding Verbalized;Demonstrated  -EC        User Key  (r) = Recorded By, (t) = Taken By, (c) = Cosigned By    Initials Name Provider Type    SUSANNE Bond PTA Physical Therapy Assistant                Time Calculation:   Start Time: 1545  Stop Time: 1633  Time Calculation (min): 48 min  Total Timed Code Minutes- PT: 48 minute(s)    Therapy Charges for Today     Code Description Service Date Service Provider Modifiers Qty    63486828140 HC PT MANUAL THERAPY EA 15 MIN 7/6/2017 Kamari Bond PTA GP 1    28715119934 HC PT THER PROC EA 15 MIN 7/6/2017 Kamari Bond PTA GP 2                    Kamari Bond PTA  7/6/2017

## 2017-07-11 ENCOUNTER — HOSPITAL ENCOUNTER (OUTPATIENT)
Dept: PHYSICAL THERAPY | Facility: HOSPITAL | Age: 42
Setting detail: THERAPIES SERIES
Discharge: HOME | End: 2017-07-11

## 2017-07-11 DIAGNOSIS — M25.511 RIGHT SHOULDER PAIN, UNSPECIFIED CHRONICITY: Primary | ICD-10-CM

## 2017-07-11 PROCEDURE — 97110 THERAPEUTIC EXERCISES: CPT

## 2017-07-11 NOTE — THERAPY TREATMENT NOTE
Outpatient Physical Therapy Ortho Treatment Note  [facility]     Patient Name: Vanesa Manuel  : 1975  MRN: [id number]  Today's Date: 2017      Visit Date: 2017    Visit Dx:    ICD-10-CM ICD-9-CM   1. Right shoulder pain, unspecified chronicity M25.511 719.41       Patient Active Problem List   Diagnosis   • Breast cancer greater than or equal to 2 cm in greatest dimension        Past Medical History:   Diagnosis Date   • Anemia    • Breast cancer    • Cancer     breast        Past Surgical History:   Procedure Laterality Date   • BREAST BIOPSY Left    • BREAST SURGERY      biopsy   •  SECTION     •  SECTION     • OTHER SURGICAL HISTORY      took out expanders and placed implants   • WISDOM TOOTH EXTRACTION                               PT Assessment/Plan       17 1643       PT Assessment    Assessment Comments Her pain at night has abolished and now she is intermittently symptomatic. With scratch collapse testing she was slightly positive B pecs and scalenes while negative at the L carpal and teres regions, significantly positive on the R carpal and pronator teres regions.   -EC     PT Plan    PT Plan Comments Continue shoulder strengthening, mobilizations of the scapula, and assess the effects of the taping application. Also during her next session we will assess all goals for the purpose of reccertification of her POC.  -EC       User Key  (r) = Recorded By, (t) = Taken By, (c) = Cosigned By    Initials Name Provider Type    SUSANNE Bond PTA Physical Therapy Assistant                    Exercises       17 1600 17 1500       Subjective Comments    Subjective Comments  Pt reports she has been sleeping better without pain disrupting her sleep, no N/T last time she had shoulder pain was this weekend.  -EC     Subjective Pain    Able to rate subjective pain?  yes  -EC     Pre-Treatment Pain Level  0  -EC     Post-Treatment Pain Level  2  -EC     Subjective  "Pain Comment  Post session she reports pain in the back of R shoulder blade  -EC     Exercise 1    Exercise Name 1 manual L pec stretches  -EC      Reps 1 5  -EC      Time (Seconds) 1 30  -EC      Exercise 2    Exercise Name 2 hooklying wand flexion   -EC      Equipment 2 Dowel  -EC      Weights/Plates 2 2  -EC      Reps 2 20  -EC      Exercise 3    Exercise Name 3 R supine CCW/CW  -EC      Time (Minutes) 3 2   each  -EC      Exercise 4    Exercise Name 4 R shoulder flexion, abduction in scaption, IR stretches in closed pack with Grade 1 LAD, ER/IR stretches in scapti  -EC      Exercise 5    Exercise Name 5 L side lying R scapular depressions  -EC      Cueing 5 Tactile  -EC      Reps 5 10  -EC      Exercise 6    Exercise Name 6 B unilateral supine serratus punches   -EC      Equipment 6 Dumbell  -EC      Weights/Plates 6 2  -EC      Reps 6 20  -EC      Exercise 7    Exercise Name 7 scratch collapse test B UE (see assessment section)  -EC      Exercise 8    Exercise Name 8 Sure prep, K.T. \"I\" correction strips R carpal and pronator teres regions with an additional \"I\" strip 40% stretch R wrist flexors  -EC        User Key  (r) = Recorded By, (t) = Taken By, (c) = Cosigned By    Initials Name Provider Type    SUSANNE Bond, PTA Physical Therapy Assistant                               PT OP Goals       07/11/17 1539       PT Short Term Goals    STG Date to Achieve 06/30/17  -EC     STG 1 Patient will report no right shoulder pain above a 2/10 for 2 weeks.   -EC     STG 1 Progress Ongoing  -EC     STG 2 Patient will demonstrate bilateral neutral scapular resting posture avoiding downward rotation  -EC     STG 2 Progress Ongoing  -EC     Long Term Goals    LTG Date to Achieve 07/16/17  -EC     LTG 1 Patient will be independent with a comprehensive HEP by the time of discharge.  -EC     LTG 1 Progress Ongoing  -EC     LTG 1 Progress Comments added serratus punches today  -EC     LTG 2 Patient will work a full shift " at work with no complaints of right shoulder pain or radicular pain down the right arm by discharge.  -EC     LTG 2 Progress Ongoing  -EC     LTG 3 Patient will demonstrate (R) shoulder global strength to at least 4+/5 on MMT by discharge.  -EC     LTG 3 Progress Ongoing  -EC     LTG 4 Patient will demonstrate no right UE radicular pain for at least one week by discharge.  -EC     LTG 4 Progress Ongoing  -EC     LTG 5 Patient will report (R) shoulder symptoms <3/10 with functional reaching including putting on a bra, by discharge.  -EC     LTG 5 Progress Ongoing  -EC     Time Calculation    PT Goal Re-Cert Due Date 07/16/17  -EC       User Key  (r) = Recorded By, (t) = Taken By, (c) = Cosigned By    Initials Name Provider Type    SUSANNE Bond PTA Physical Therapy Assistant                Therapy Education       07/11/17 1641          Therapy Education    Given HEP  -EC      Program New   B unilateral supine serratus punch  -EC      How Provided Verbal  -EC      Provided to Patient  -EC      Level of Understanding Verbalized;Demonstrated  -EC        User Key  (r) = Recorded By, (t) = Taken By, (c) = Cosigned By    Initials Name Provider Type    SUSANNE Bond PTA Physical Therapy Assistant                Time Calculation:   Start Time: 1539  Stop Time: 1628  Time Calculation (min): 49 min  Total Timed Code Minutes- PT: 49 minute(s)    Therapy Charges for Today     Code Description Service Date Service Provider Modifiers Qty    49089871009 HC PT THER PROC EA 15 MIN 7/11/2017 Kamari Bond PTA GP 3                    Kamari Bond PTA  7/11/2017

## 2017-07-13 ENCOUNTER — HOSPITAL ENCOUNTER (OUTPATIENT)
Dept: PHYSICAL THERAPY | Facility: HOSPITAL | Age: 42
Setting detail: THERAPIES SERIES
Discharge: HOME | End: 2017-07-13

## 2017-07-13 DIAGNOSIS — M25.511 RIGHT SHOULDER PAIN, UNSPECIFIED CHRONICITY: Primary | ICD-10-CM

## 2017-07-13 PROCEDURE — 97110 THERAPEUTIC EXERCISES: CPT

## 2017-07-13 PROCEDURE — 97140 MANUAL THERAPY 1/> REGIONS: CPT

## 2017-07-13 NOTE — THERAPY TREATMENT NOTE
Outpatient Physical Therapy Ortho Treatment Note  [facility]     Patient Name: Vanesa Manuel  : 1975  MRN: [id number]  Today's Date: 2017      Visit Date: 2017    Visit Dx:    ICD-10-CM ICD-9-CM   1. Right shoulder pain, unspecified chronicity M25.511 719.41       Patient Active Problem List   Diagnosis   • Breast cancer greater than or equal to 2 cm in greatest dimension        Past Medical History:   Diagnosis Date   • Anemia    • Breast cancer    • Cancer     breast        Past Surgical History:   Procedure Laterality Date   • BREAST BIOPSY Left    • BREAST SURGERY      biopsy   •  SECTION     •  SECTION     • OTHER SURGICAL HISTORY      took out expanders and placed implants   • WISDOM TOOTH EXTRACTION                               PT Assessment/Plan       17 1649       PT Assessment    Assessment Comments Her symptoms are much improved but her habitually poor posture and her weak scapular stabiizers give her significant challenges. However, she is exhibiting small strength gains as well as increased range as she was able to reach her bra line today, thus proof her IR has improved.  -EC     PT Plan    PT Plan Comments Review and bolster HEP, continue to work on scapular stability.  -EC       User Key  (r) = Recorded By, (t) = Taken By, (c) = Cosigned By    Initials Name Provider Type    EC Kamari Bond PTA Physical Therapy Assistant                    Exercises       17 1600 17 1500       Subjective Comments    Subjective Comments  Pt reports R scapular pain  -EC     Subjective Pain    Able to rate subjective pain?  yes  -EC     Pre-Treatment Pain Level  2  -EC     Post-Treatment Pain Level  0  -EC     Exercise 2    Exercise Name 2 reviewed goals for recertification (see goals section)  -EC      Exercise 3    Exercise Name 3 isometric holds in hooklying 40, 90, 120 degrees flexion and ER/IR at 90/90  -EC      Equipment 3 Dumbell  -EC       Weights/Plates 3 2  -EC      Time (Seconds) 3 30  -EC      Exercise 4    Exercise Name 4 L side lying R scapular retraction  -EC      Sets 4 2  -EC      Reps 4 10  -EC      Exercise 5    Exercise Name 5 L side lying R scapular depressions  -EC      Cueing 5 Tactile  -EC      Sets 5 2  -EC      Reps 5 10  -EC        User Key  (r) = Recorded By, (t) = Taken By, (c) = Cosigned By    Initials Name Provider Type    EC Kamari Bond PTA Physical Therapy Assistant                        Manual Rx (last 36 hours)      Manual Treatments       07/13/17 1500          Manual Rx 1    Manual Rx 1 Location R teres minor, lower and middle trap   -EC      Manual Rx 1 Type STM in L side lying  -EC      Manual Rx 1 Duration 15  -EC      Manual Rx 2    Manual Rx 2 Location R scapula  -EC      Manual Rx 2 Type upward rotation mobilization  -EC      Manual Rx 2 Grade 2  -EC      Manual Rx 2 Duration 5  -EC        User Key  (r) = Recorded By, (t) = Taken By, (c) = Cosigned By    Initials Name Provider Type    EC Kamari Bond PTA Physical Therapy Assistant                PT OP Goals       07/13/17 1600       PT Short Term Goals    STG Date to Achieve 07/27/17  -EC     STG 1 Patient will report no right shoulder pain above a 2/10 for 2 weeks.   -EC     STG 1 Progress Ongoing  -EC     STG 1 Progress Comments no pain for one week  -EC     STG 2 Patient will demonstrate bilateral neutral scapular resting posture avoiding downward rotation  -EC     STG 2 Progress Ongoing  -EC     STG 2 Progress Comments B winging, and downward rotation  -EC     Long Term Goals    LTG Date to Achieve 08/12/17  -EC     LTG 1 Patient will be independent with a comprehensive HEP by the time of discharge.  -EC     LTG 1 Progress Ongoing  -EC     LTG 1 Progress Comments verbalized components today  -EC     LTG 2 Patient will work a full shift at work with no complaints of right shoulder pain or radicular pain down the right arm by discharge.  -EC     LTG 2  Progress Ongoing  -EC     LTG 2 Progress Comments she has changed jobs and isn't having symptoms  -EC     LTG 3 Patient will demonstrate (R) shoulder global strength to at least 4+/5 on MMT by discharge.  -EC     LTG 3 Progress Ongoing  -EC     LTG 3 Progress Comments 4-/5 flexion and abduction, ER and IR not tested today  -EC     LTG 4 Patient will demonstrate no right UE radicular pain for at least one week by discharge.  -EC     LTG 4 Progress Ongoing  -EC     LTG 4 Progress Comments none repoted last three sessions , scratch collapsenegative R carpal, and pronator teres  -EC     LTG 5 Patient will report (R) shoulder symptoms <3/10 with functional reaching including putting on a bra, by discharge.  -EC     LTG 5 Progress Ongoing  -EC     LTG 5 Progress Comments 2/10 today prior to treatment  -EC     Time Calculation    PT Goal Re-Cert Due Date 08/12/17  -EC       User Key  (r) = Recorded By, (t) = Taken By, (c) = Cosigned By    Initials Name Provider Type    SUSANNE Bond PTA Physical Therapy Assistant                Therapy Education       07/13/17 1649          Therapy Education    Given Posture/body mechanics  -EC      How Provided Verbal  -EC      Provided to Patient  -EC      Level of Understanding Verbalized  -EC        User Key  (r) = Recorded By, (t) = Taken By, (c) = Cosigned By    Initials Name Provider Type    SUSANNE Bond PTA Physical Therapy Assistant                Time Calculation:   Start Time: 1545  Stop Time: 1628  Time Calculation (min): 43 min  Total Timed Code Minutes- PT: 43 minute(s)    Therapy Charges for Today     Code Description Service Date Service Provider Modifiers Qty    04067265612 HC PT MANUAL THERAPY EA 15 MIN 7/13/2017 Kamari Bond PTA GP 1    42000119567 HC PT THER PROC EA 15 MIN 7/13/2017 Kamari Bond PTA GP 2                    Kamari Bond PTA  7/13/2017

## 2017-07-18 ENCOUNTER — HOSPITAL ENCOUNTER (OUTPATIENT)
Dept: PHYSICAL THERAPY | Facility: HOSPITAL | Age: 42
Setting detail: THERAPIES SERIES
Discharge: HOME | End: 2017-07-18

## 2017-07-18 DIAGNOSIS — M25.511 RIGHT SHOULDER PAIN, UNSPECIFIED CHRONICITY: Primary | ICD-10-CM

## 2017-07-18 PROCEDURE — 97110 THERAPEUTIC EXERCISES: CPT

## 2017-07-18 NOTE — THERAPY TREATMENT NOTE
Outpatient Physical Therapy Ortho Treatment Note   [place]     Patient Name: Vanesa Manuel  : 1975  MRN: [id number]  Today's Date: 2017      Visit Date: 2017    Visit Dx:    ICD-10-CM ICD-9-CM   1. Right shoulder pain, unspecified chronicity M25.511 719.41       Patient Active Problem List   Diagnosis   • Breast cancer greater than or equal to 2 cm in greatest dimension        Past Medical History:   Diagnosis Date   • Anemia    • Breast cancer    • Cancer     breast        Past Surgical History:   Procedure Laterality Date   • BREAST BIOPSY Left    • BREAST SURGERY      biopsy   •  SECTION     •  SECTION     • OTHER SURGICAL HISTORY      took out expanders and placed implants   • WISDOM TOOTH EXTRACTION                               PT Assessment/Plan       17 1600       PT Assessment    Assessment Comments The upward rotation mobility has improved significantly.  Pain has not gone above 2/10 for the past two weeks, so we are making good progress.  Proximal stability continues to improve but we still have a little ways to go.    -RS     PT Plan    PT Plan Comments progress HEP as we move toward discharge; continue with proximal stability.  -RS       User Key  (r) = Recorded By, (t) = Taken By, (c) = Cosigned By    Initials Name Provider Type    RS Nando Herron, PT DPT Physical Therapist                    Exercises       17 1500          Subjective Comments    Subjective Comments The worst that the pain got up to over the weekend was 2/10 with chores.  She feels that the HEP is going well.  -RS      Subjective Pain    Able to rate subjective pain? yes  -RS      Pre-Treatment Pain Level 0  -RS      Post-Treatment Pain Level 0  -RS      Exercise 1    Exercise Name 1 (R) ER with the arm by the side with therabar for perturbations    towel under elbow  -RS      Exercise 2    Exercise Name 2 reviewed goals for recertification (see goals section)  -RS       Exercise 3    Exercise Name 3 isometric holds in hooklying 40, 90, 120 degrees flexion and ER/IR at 90/90  -RS      Equipment 3 Dumbell  -RS      Weights/Plates 3 2  -RS      Time (Seconds) 3 30  -RS      Exercise 4    Exercise Name 4 L side lying R scapular retraction  -RS      Sets 4 2  -RS      Reps 4 10  -RS      Exercise 5    Exercise Name 5 L side lying R scapular depressions  -RS      Cueing 5 Tactile  -RS      Sets 5 2  -RS      Reps 5 10  -RS      Exercise 6    Exercise Name 6 serratus slides in hallway  -RS      Sets 6 2  -RS      Reps 6 15  -RS        User Key  (r) = Recorded By, (t) = Taken By, (c) = Cosigned By    Initials Name Provider Type    RS Nando Herron, PT DPT Physical Therapist                               PT OP Goals       07/18/17 1600       PT Short Term Goals    STG Date to Achieve 07/27/17  -RS     STG 1 Patient will report no right shoulder pain above a 2/10 for 2 weeks.   -RS     STG 1 Progress Met  -RS     STG 1 Progress Comments no pain >2/10 for the past 2 weeks  -RS     STG 2 Patient will demonstrate bilateral neutral scapular resting posture avoiding downward rotation  -RS     STG 2 Progress Partially Met  -RS     Long Term Goals    LTG Date to Achieve 08/12/17  -RS     LTG 1 Patient will be independent with a comprehensive HEP by the time of discharge.  -RS     LTG 1 Progress Ongoing  -RS     LTG 2 Patient will work a full shift at work with no complaints of right shoulder pain or radicular pain down the right arm by discharge.  -RS     LTG 2 Progress Ongoing  -RS     LTG 3 Patient will demonstrate (R) shoulder global strength to at least 4+/5 on MMT by discharge.  -RS     LTG 3 Progress Ongoing  -RS     LTG 4 Patient will demonstrate no right UE radicular pain for at least one week by discharge.  -RS     LTG 4 Progress Ongoing  -RS     LTG 5 Patient will report (R) shoulder symptoms <3/10 with functional reaching including putting on a bra, by discharge.  -RS     LTG 5  Progress Ongoing  -RS     Time Calculation    PT Goal Re-Cert Due Date 08/12/17  -RS       User Key  (r) = Recorded By, (t) = Taken By, (c) = Cosigned By    Initials Name Provider Type    PARAS Herron, PT DPT Physical Therapist                Therapy Education       07/18/17 1600          Therapy Education    Given HEP  -RS      Program New   serratus slide on wall x 30 per day  -RS      How Provided Verbal;Demonstration  -RS      Provided to Patient  -RS      Level of Understanding Demonstrated;Verbalized  -RS        User Key  (r) = Recorded By, (t) = Taken By, (c) = Cosigned By    Initials Name Provider Type    PARAS Herron, PT DPT Physical Therapist                Time Calculation:   Start Time: 1545  Stop Time: 1630  Time Calculation (min): 45 min  Total Timed Code Minutes- PT: 45 minute(s)    Therapy Charges for Today     Code Description Service Date Service Provider Modifiers Qty    26748182831 HC PT THER PROC EA 15 MIN 7/18/2017 Nando Herron, PT DPT GP 3                    JOSE MIGUEL Herron, PT DPT  7/18/2017

## 2017-07-20 ENCOUNTER — HOSPITAL ENCOUNTER (OUTPATIENT)
Dept: PHYSICAL THERAPY | Facility: HOSPITAL | Age: 42
Setting detail: THERAPIES SERIES
Discharge: HOME | End: 2017-07-20

## 2017-07-20 DIAGNOSIS — M25.511 RIGHT SHOULDER PAIN, UNSPECIFIED CHRONICITY: Primary | ICD-10-CM

## 2017-07-20 PROCEDURE — 97110 THERAPEUTIC EXERCISES: CPT

## 2017-07-25 ENCOUNTER — HOSPITAL ENCOUNTER (OUTPATIENT)
Dept: PHYSICAL THERAPY | Facility: HOSPITAL | Age: 42
Setting detail: THERAPIES SERIES
Discharge: HOME | End: 2017-07-25

## 2017-07-25 DIAGNOSIS — M25.511 RIGHT SHOULDER PAIN, UNSPECIFIED CHRONICITY: Primary | ICD-10-CM

## 2017-07-25 PROCEDURE — 97110 THERAPEUTIC EXERCISES: CPT

## 2017-07-25 NOTE — THERAPY TREATMENT NOTE
Outpatient Physical Therapy Ortho Treatment Note   [place]     Patient Name: Vanesa Manuel  : 1975  MRN: [id number]  Today's Date: 2017      Visit Date: 2017    Visit Dx:    ICD-10-CM ICD-9-CM   1. Right shoulder pain, unspecified chronicity M25.511 719.41       Patient Active Problem List   Diagnosis   • Breast cancer greater than or equal to 2 cm in greatest dimension        Past Medical History:   Diagnosis Date   • Anemia    • Breast cancer    • Cancer     breast        Past Surgical History:   Procedure Laterality Date   • BREAST BIOPSY Left    • BREAST SURGERY      biopsy   •  SECTION     •  SECTION     • OTHER SURGICAL HISTORY      took out expanders and placed implants   • WISDOM TOOTH EXTRACTION                               PT Assessment/Plan       17 4642       PT Assessment    Assessment Comments Patient came in today with increased pain in her right shoulder, which started today.  She doesn't know what she did to make it hurt, but she did a lot of vacuuming today, which increased the pain.  She was showing decreased stability with proprioceptive activities today with arm at end range and with external rotation.    -MALIKA     PT Plan    PT Plan Comments We will continue with proprioceptive activities and proximal strengthening.  Add to HEP next visit; we did not today due to her increased pain before coming into therapy.  -MALIKA       User Key  (r) = Recorded By, (t) = Taken By, (c) = Cosigned By    Initials Name Provider Type    MALIKA Diaz PTA Physical Therapy Assistant                    Exercises       17 3502          Subjective Comments    Subjective Comments Patient reports she has had increased pain in her R shoulder today. She states she has vacuumed a lot today.  It was sharp pains but now it is more soreness and aggravated.  -MALIKA      Subjective Pain    Able to rate subjective pain? yes  -MALIKA      Pre-Treatment Pain Level 5  -MALIKA       Post-Treatment Pain Level 3  -MALIKA      Exercise 1    Exercise Name 1 hooklying with shoulders at 45 degrees with Tbar and pertubations  -MALIKA      Cueing 1 Verbal;Tactile  -MALIKA      Sets 1 1  -MALIKA      Reps 1 3  -MALIKA      Time (Seconds) 1 30s  -MALIKA      Exercise 2    Exercise Name 2 hooklying aternating stabilization with shoulders at 90 degrees with 1# tbar  -MALIKA      Cueing 2 Verbal;Tactile  -MALIKA      Sets 2 1  -MALIKA      Reps 2 5  -MALIKA      Time (Seconds) 2 30s  -MALIKA      Exercise 3    Exercise Name 3 sidelying (R) ER with towel in under arm  -MALIKA      Cueing 3 Verbal;Tactile  -MALIKA      Sets 3 2  -MALIKA      Reps 3 10  -MALIKA      Additional Comments 2 pound yellow ball  -MALIKA      Exercise 4    Exercise Name 4 sidelying (R) ER with towel under arm: holding 1# tbar and pertubations  -MALIKA      Cueing 4 Verbal;Tactile  -MALIKA      Sets 4 1  -MALIKA      Reps 4 5  -MALIKA      Time (Seconds) 4 30s  -MALIKA      Exercise 5    Exercise Name 5 standing at wall shoulder flexion with 45 cm ball and 3 pulses at end range  -MALIKA      Cueing 5 Verbal  -MALIKA      Sets 5 2  -MALIKA      Reps 5 15  -MALIKA      Exercise 6    Exercise Name 6 standing cw/ccw with small orange ball  -MALIKA      Cueing 6 Verbal  -MALIKA      Sets 6 1   each direction  -MALIKA      Time (Minutes) 6 1 minute each  -MALIKA      Exercise 7    Exercise Name 7 standing at wall with 45 cm (R) flexion ad pertubations at end range  -MALIKA      Cueing 7 Verbal  -MALIKA      Sets 7 2  -MALIKA      Reps 7 3  -MALIKA      Time (Seconds) 7 30s  -MALIKA        User Key  (r) = Recorded By, (t) = Taken By, (c) = Cosigned By    Initials Name Provider Type    MALIKA Diaz, PTA Physical Therapy Assistant                               PT OP Goals       07/25/17 1534       PT Short Term Goals    STG Date to Achieve 07/27/17  -MALIKA     STG 1 Patient will report no right shoulder pain above a 2/10 for 2 weeks.   -MALIKA     STG 1 Progress Met  -MALIKA     STG 2 Patient will demonstrate bilateral neutral scapular resting posture avoiding downward rotation   -MALIKA     STG 2 Progress Partially Met  -MALIKA     Long Term Goals    LTG Date to Achieve 08/12/17  -MALIKA     LTG 1 Patient will be independent with a comprehensive HEP by the time of discharge.  -MALIKA     LTG 1 Progress Ongoing  -MALIKA     LTG 1 Progress Comments She reports compliance.  We did not add to today due to patient already being in more pain.  -MALIKA     LTG 2 Patient will work a full shift at work with no complaints of right shoulder pain or radicular pain down the right arm by discharge.  -MALIKA     LTG 2 Progress Ongoing  -MALIKA     LTG 2 Progress Comments She worked 8 hours today, and she reports with vacuuming cleaning today she could feel her shoulder becoming more sore and sharp pains.  -MALIKA     LTG 3 Patient will demonstrate (R) shoulder global strength to at least 4+/5 on MMT by discharge.  -MALIKA     LTG 3 Progress Ongoing  -MALIKA     LTG 4 Patient will demonstrate no right UE radicular pain for at least one week by discharge.  -MALIKA     LTG 4 Progress Ongoing  -MALIKA     LTG 4 Progress Comments She states earlier today she had pain down the arm, however now she does not have pain down her arm.  -MALIKA     LTG 5 Patient will report (R) shoulder symptoms <3/10 with functional reaching including putting on a bra, by discharge.  -MALIKA     LTG 5 Progress Ongoing  -MALIKA     Time Calculation    PT Goal Re-Cert Due Date 08/12/17  -MALIKA       User Key  (r) = Recorded By, (t) = Taken By, (c) = Cosigned By    Initials Name Provider Type    MALIKA Diaz PTA Physical Therapy Assistant                Therapy Education       07/25/17 1623          Therapy Education    Given HEP  -MALIKA      Program Reinforced  -MALIKA      How Provided Verbal  -MALIKA      Provided to Patient  -MALIKA      Level of Understanding Verbalized  -MALIKA        User Key  (r) = Recorded By, (t) = Taken By, (c) = Cosigned By    Initials Name Provider Type    MALIKA Diaz PTA Physical Therapy Assistant                Time Calculation:   Start Time: 1534  Stop Time: 1615  Time  Calculation (min): 41 min  Total Timed Code Minutes- PT: 41 minute(s)    Therapy Charges for Today     Code Description Service Date Service Provider Modifiers Qty    51968237109 HC PT THER PROC EA 15 MIN 7/25/2017 Vignesh Diaz, PTA GP 3                    Vignesh Diaz, PTA  7/25/2017

## 2017-07-27 ENCOUNTER — HOSPITAL ENCOUNTER (OUTPATIENT)
Dept: PHYSICAL THERAPY | Facility: HOSPITAL | Age: 42
Setting detail: THERAPIES SERIES
Discharge: HOME | End: 2017-07-27

## 2017-07-27 DIAGNOSIS — M25.511 RIGHT SHOULDER PAIN, UNSPECIFIED CHRONICITY: Primary | ICD-10-CM

## 2017-07-27 PROCEDURE — 97110 THERAPEUTIC EXERCISES: CPT

## 2017-07-27 NOTE — THERAPY TREATMENT NOTE
Outpatient Physical Therapy Ortho Treatment Note  [facility]     Patient Name: Vanesa Manuel  : 1975  MRN: [id number]  Today's Date: 2017      Visit Date: 2017    Visit Dx:    ICD-10-CM ICD-9-CM   1. Right shoulder pain, unspecified chronicity M25.511 719.41       Patient Active Problem List   Diagnosis   • Breast cancer greater than or equal to 2 cm in greatest dimension        Past Medical History:   Diagnosis Date   • Anemia    • Breast cancer    • Cancer     breast        Past Surgical History:   Procedure Laterality Date   • BREAST BIOPSY Left    • BREAST SURGERY      biopsy   •  SECTION     •  SECTION     • OTHER SURGICAL HISTORY      took out expanders and placed implants   • WISDOM TOOTH EXTRACTION                               PT Assessment/Plan       17 3809       PT Assessment    Assessment Comments Today I introduced therex in standing and while she did well  she has a tendency to shrug her shoulder and activiate her upper trap. In the future we will slowly add these activiities to her HEP and continue to work on her scapular stability as she struggled with [employer] Blade activities.  -EC     PT Plan    PT Plan Comments Continue to progress her strengthening to include more CKC in standing.  -EC       User Key  (r) = Recorded By, (t) = Taken By, (c) = Cosigned By    Initials Name Provider Type    SUSANNE Bond PTA Physical Therapy Assistant                    Exercises       17 1500          Subjective Comments    Subjective Comments She reports no shouolder pain right now but it has been bugging her more this week with activities such as vaccuming  -EC      Subjective Pain    Able to rate subjective pain? yes  -EC      Pre-Treatment Pain Level 0  -EC      Post-Treatment Pain Level 0  -EC      Exercise 1    Exercise Name 1 serratus wall press  -EC      Reps 1 20  -EC      Exercise 2    Exercise Name 2 wall slides  -EC      Reps 2 20  -EC      Exercise  "3    Exercise Name 3 isometric shoulder flexion holds with manual perturbations 45, 90,120 degrees   -EC      Reps 3 3  -EC      Time (Seconds) 3 30  -EC      Additional Comments #2  -EC      Exercise 4    Exercise Name 4 supine R serratus punches with Body Blade  -EC      Reps 4 3  -EC      Time (Seconds) 4 30  -EC      Exercise 5    Exercise Name 5 L sidelying R ER  -EC      Reps 5 20  -EC      Additional Comments #2  -EC      Exercise 6    Exercise Name 6 hooklying progressive pec/thoracic stretch  -EC      Time (Minutes) 6 5  -EC      Exercise 7    Exercise Name 7 B unilateral shoulder horizontal abduction  -EC      Reps 7 20  -EC      Additional Comments yellow T band, 1/2 foam roller  -EC      Exercise 8    Exercise Name 8 \"X\" pattern on 1/2 foam roller with yellow T band  -EC      Reps 8 10  -EC        User Key  (r) = Recorded By, (t) = Taken By, (c) = Cosigned By    Initials Name Provider Type    EC Kamari Bond, PTA Physical Therapy Assistant                               PT OP Goals       07/27/17 1542       PT Short Term Goals    STG Date to Achieve 07/27/17  -EC     STG 1 Patient will report no right shoulder pain above a 2/10 for 2 weeks.   -EC     STG 1 Progress Met  -EC     STG 2 Patient will demonstrate bilateral neutral scapular resting posture avoiding downward rotation  -EC     STG 2 Progress Partially Met  -EC     Long Term Goals    LTG Date to Achieve 08/12/17  -EC     LTG 1 Patient will be independent with a comprehensive HEP by the time of discharge.  -EC     LTG 1 Progress Ongoing  -EC     LTG 2 Patient will work a full shift at work with no complaints of right shoulder pain or radicular pain down the right arm by discharge.  -EC     LTG 2 Progress Ongoing  -EC     LTG 2 Progress Comments continues to have intermittent shoulder pain with ADL's  -EC     LTG 3 Patient will demonstrate (R) shoulder global strength to at least 4+/5 on MMT by discharge.  -EC     LTG 3 Progress Ongoing  -EC     " LTG 4 Patient will demonstrate no right UE radicular pain for at least one week by discharge.  -EC     LTG 4 Progress Ongoing  -EC     LTG 5 Patient will report (R) shoulder symptoms <3/10 with functional reaching including putting on a bra, by discharge.  -EC     LTG 5 Progress Ongoing  -EC     Time Calculation    PT Goal Re-Cert Due Date 08/12/17  -EC       User Key  (r) = Recorded By, (t) = Taken By, (c) = Cosigned By    Initials Name Provider Type    SUSANNE Bond PTA Physical Therapy Assistant                Therapy Education       07/27/17 1638          Therapy Education    Given Symptoms/condition management;Posture/body mechanics  -EC      How Provided Verbal  -EC      Provided to Patient  -EC      Level of Understanding Verbalized;Demonstrated  -EC        User Key  (r) = Recorded By, (t) = Taken By, (c) = Cosigned By    Initials Name Provider Type    EC Kamari Bond PTA Physical Therapy Assistant                Time Calculation:   Start Time: 1541  Stop Time: 1630  Time Calculation (min): 49 min  Total Timed Code Minutes- PT: 49 minute(s)    Therapy Charges for Today     Code Description Service Date Service Provider Modifiers Qty    87339586111 HC PT THER PROC EA 15 MIN 7/27/2017 Kamari Bond PTA GP 3                    Kamari Bond PTA  7/27/2017

## 2017-08-01 ENCOUNTER — HOSPITAL ENCOUNTER (OUTPATIENT)
Dept: PHYSICAL THERAPY | Facility: HOSPITAL | Age: 42
Setting detail: THERAPIES SERIES
Discharge: HOME | End: 2017-08-01

## 2017-08-01 DIAGNOSIS — M25.511 RIGHT SHOULDER PAIN, UNSPECIFIED CHRONICITY: Primary | ICD-10-CM

## 2017-08-01 PROCEDURE — 97110 THERAPEUTIC EXERCISES: CPT

## 2017-08-03 ENCOUNTER — HOSPITAL ENCOUNTER (OUTPATIENT)
Dept: PHYSICAL THERAPY | Facility: HOSPITAL | Age: 42
Setting detail: THERAPIES SERIES
Discharge: HOME | End: 2017-08-03

## 2017-08-03 DIAGNOSIS — M25.511 RIGHT SHOULDER PAIN, UNSPECIFIED CHRONICITY: Primary | ICD-10-CM

## 2017-08-03 PROCEDURE — 97110 THERAPEUTIC EXERCISES: CPT | Performed by: PHYSICAL THERAPIST

## 2017-08-03 NOTE — THERAPY TREATMENT NOTE
Outpatient Physical Therapy Ortho Treatment Note  [facility]     Patient Name: Vanesa Manuel  : 1975  MRN: [id number]  Today's Date: 8/3/2017      Visit Date: 2017    Visit Dx:    ICD-10-CM ICD-9-CM   1. Right shoulder pain, unspecified chronicity M25.511 719.41       Patient Active Problem List   Diagnosis   • Breast cancer greater than or equal to 2 cm in greatest dimension        Past Medical History:   Diagnosis Date   • Anemia    • Breast cancer    • Cancer     breast        Past Surgical History:   Procedure Laterality Date   • BREAST BIOPSY Left    • BREAST SURGERY      biopsy   •  SECTION     •  SECTION     • OTHER SURGICAL HISTORY      took out expanders and placed implants   • WISDOM TOOTH EXTRACTION                               PT Assessment/Plan       17 1600       PT Assessment    Assessment Comments Her pain continues to do well. Her main problem now is her weakness in her right arm and the lack of stability in her scapula.  -TB     PT Plan    PT Plan Comments Continue to emphasize scapular stability and plan to progress to d/c to HEP soon as long as her pain continues to do well.   -TB       User Key  (r) = Recorded By, (t) = Taken By, (c) = Cosigned By    Initials Name Provider Type    TB Florentin Ramos, PT Physical Therapist                    Exercises       17 [id number]          Subjective Comments    Subjective Comments She feels like she is doing well. She feels like she is about 40% of normal.   -TB      Subjective Pain    Pre-Treatment Pain Level 0  -TB      Exercise 1    Exercise Name 1 W's against red tband   -TB      Time (Minutes) 1 3  -TB      Additional Comments seated on green swiss ball  -TB      Exercise 2    Exercise Name 2 shoulder clocks against red tband  -TB      Time (Minutes) 2 3  -TB      Additional Comments sitting on green swiss ball  -TB      Exercise 3    Exercise Name 3 wall/foam roll ups malick arms  -TB      Time (Minutes) 3 5  -TB       Additional Comments cues for strong core and scapular stability  -TB      Exercise 4    Exercise Name 4 right shoulder wall/ball scaption  -TB      Sets 4 2  -TB      Time (Minutes) 4 3  -TB      Additional Comments yellow swiss ball  -TB      Exercise 5    Exercise Name 5 right shoulder wall/ball circles in scaption  -TB      Sets 5 5  -TB      Time (Minutes) 5 10 reps CW, 10 reps CCW  -TB      Exercise 6    Exercise Name 6 body blade IR/ER, punch forward and downward  -TB      Additional Comments she struggled with IR/ER, did better with the punch  -TB      Exercise 7    Exercise Name 7 UBE L3  -TB      Time (Minutes) 7 8  -TB      Additional Comments reversed direction every minute  -TB        User Key  (r) = Recorded By, (t) = Taken By, (c) = Cosigned By    Initials Name Provider Type    TB Florentin Ramos, PT Physical Therapist                               PT OP Goals       08/03/17 1545       PT Short Term Goals    STG Date to Achieve 07/27/17  -TB     STG 1 Patient will report no right shoulder pain above a 2/10 for 2 weeks.   -TB     STG 1 Progress Met  -TB     STG 2 Patient will demonstrate bilateral neutral scapular resting posture avoiding downward rotation  -TB     STG 2 Progress Partially Met  -TB     Long Term Goals    LTG Date to Achieve 08/12/17  -TB     LTG 1 Patient will be independent with a comprehensive HEP by the time of discharge.  -TB     LTG 1 Progress Ongoing  -TB     LTG 2 Patient will work a full shift at work with no complaints of right shoulder pain or radicular pain down the right arm by discharge.  -TB     LTG 2 Progress Ongoing  -TB     LTG 3 Patient will demonstrate (R) shoulder global strength to at least 4+/5 on MMT by discharge.  -TB     LTG 3 Progress Ongoing  -TB     LTG 4 Patient will demonstrate no right UE radicular pain for at least one week by discharge.  -TB     LTG 4 Progress Met  -TB     LTG 5 Patient will report (R) shoulder symptoms <3/10 with functional  reaching including putting on a bra, by discharge.  -TB     LTG 5 Progress Ongoing  -TB     Time Calculation    PT Goal Re-Cert Due Date 08/12/17  -TB       User Key  (r) = Recorded By, (t) = Taken By, (c) = Cosigned By    Initials Name Provider Type    VIMAL Ramos PT Physical Therapist                Therapy Education       08/03/17 1600          Therapy Education    Education Details W's and shoulder clocks against red tband  -TB      Given HEP  -TB      Program Progressed  -TB      How Provided Verbal  -TB      Provided to Patient  -TB      Level of Understanding Verbalized;Demonstrated  -TB        User Key  (r) = Recorded By, (t) = Taken By, (c) = Cosigned By    Initials Name Provider Type    VIMAL Ramos PT Physical Therapist                Time Calculation:   Start Time: 1545  Stop Time: 1630  Time Calculation (min): 45 min  Total Timed Code Minutes- PT: 45 minute(s)    Therapy Charges for Today     Code Description Service Date Service Provider Modifiers Qty    25050126444 HC PT THER PROC EA 15 MIN 8/3/2017 Florentin Ramos, PT GP 3                    Florentin Ramos PT  8/3/2017

## 2017-08-08 ENCOUNTER — APPOINTMENT (OUTPATIENT)
Dept: PHYSICAL THERAPY | Facility: HOSPITAL | Age: 42
End: 2017-08-08

## 2017-08-10 ENCOUNTER — HOSPITAL ENCOUNTER (OUTPATIENT)
Dept: PHYSICAL THERAPY | Facility: HOSPITAL | Age: 42
Setting detail: THERAPIES SERIES
Discharge: HOME | End: 2017-08-10

## 2017-08-10 DIAGNOSIS — M25.511 RIGHT SHOULDER PAIN, UNSPECIFIED CHRONICITY: Primary | ICD-10-CM

## 2017-08-10 PROCEDURE — 97110 THERAPEUTIC EXERCISES: CPT

## 2017-08-19 ENCOUNTER — OFFICE VISIT (OUTPATIENT)
Dept: RETAIL CLINIC | Facility: CLINIC | Age: 42
End: 2017-08-19

## 2017-08-19 VITALS
SYSTOLIC BLOOD PRESSURE: 127 MMHG | RESPIRATION RATE: 16 BRPM | DIASTOLIC BLOOD PRESSURE: 91 MMHG | TEMPERATURE: 98.5 F | HEART RATE: 70 BPM | WEIGHT: 129.8 LBS | HEIGHT: 63 IN | OXYGEN SATURATION: 99 % | BODY MASS INDEX: 23 KG/M2

## 2017-08-19 DIAGNOSIS — J06.9 VIRAL UPPER RESPIRATORY INFECTION: Primary | ICD-10-CM

## 2017-08-19 PROCEDURE — 99213 OFFICE O/P EST LOW 20 MIN: CPT | Performed by: NURSE PRACTITIONER

## 2017-08-19 RX ORDER — BROMPHENIRAMINE MALEATE, PSEUDOEPHEDRINE HYDROCHLORIDE, AND DEXTROMETHORPHAN HYDROBROMIDE 2; 30; 10 MG/5ML; MG/5ML; MG/5ML
SYRUP ORAL
Qty: 240 ML | Refills: 0 | Status: SHIPPED | OUTPATIENT
Start: 2017-08-19 | End: 2017-10-02

## 2017-08-19 NOTE — PROGRESS NOTES
Chief Complaint   Patient presents with   • Cough   • Nasal Congestion     Subjective   Vanesa Manuel is a 42 y.o. female who presents to the clinic today with complaints of cough, nasal congestion and drainage.  Cough   This is a new problem. The current episode started in the past 7 days (2-3 days). The problem has been gradually worsening. The cough is non-productive. Associated symptoms include chest pain (occasional sharp pain mid chest lasting seconds (since breast surgery)), headaches (mild frontal), nasal congestion, postnasal drip and a sore throat (a little scratchy). Pertinent negatives include no chills, ear congestion, ear pain, fever, rhinorrhea, shortness of breath or wheezing. The symptoms are aggravated by lying down. She has tried nothing for the symptoms. Her past medical history is significant for environmental allergies.       Current Outpatient Prescriptions:   •  venlafaxine XR (EFFEXOR-XR) 75 MG 24 hr capsule, , Disp: , Rfl:   •  tamoxifen (NOLVADEX) 20 MG chemo tablet, Take  by mouth Daily., Disp: , Rfl:     Allergies:  Oxycodone-acetaminophen and Percocet [oxycodone-acetaminophen]    Past Medical History:   Diagnosis Date   • Anemia    • Breast cancer    • Cancer     breast     Past Surgical History:   Procedure Laterality Date   • BREAST BIOPSY Left    • BREAST SURGERY      biopsy   •  SECTION     •  SECTION     • HYSTERECTOMY     • OTHER SURGICAL HISTORY      took out expanders and placed implants   • WISDOM TOOTH EXTRACTION       Family History   Problem Relation Age of Onset   • Cancer Maternal Aunt      breast with mets   • Cancer Paternal Uncle      colon   • No Known Problems Mother    • No Known Problems Father    • No Known Problems Brother    • No Known Problems Daughter    • No Known Problems Daughter    • No Known Problems Daughter      Social History   Substance Use Topics   • Smoking status: Former Smoker   • Smokeless tobacco: None   • Alcohol use Yes       "Comment: occasional       Review of Systems  Review of Systems   Constitutional: Negative for chills, fatigue and fever.   HENT: Positive for postnasal drip and sore throat (a little scratchy). Negative for ear pain and rhinorrhea.    Eyes: Negative.    Respiratory: Positive for cough. Negative for shortness of breath and wheezing.    Cardiovascular: Positive for chest pain (occasional sharp pain mid chest lasting seconds (since breast surgery)). Negative for palpitations.   Allergic/Immunologic: Positive for environmental allergies.   Neurological: Positive for headaches (mild frontal).       Objective   /91 (BP Location: Left arm, Patient Position: Sitting, Cuff Size: Adult) Comment: automatic cuff both times  Pulse 70  Temp 98.5 °F (36.9 °C) (Oral)   Resp 16  Ht 63\" (160 cm)  Wt 129 lb 12.8 oz (58.9 kg)  LMP  (LMP Unknown)  SpO2 99%  BMI 22.99 kg/m2      Physical Exam   Constitutional: She is oriented to person, place, and time. She appears well-developed and well-nourished. She is cooperative. She does not appear ill. No distress.   HENT:   Head: Normocephalic and atraumatic.   Right Ear: Tympanic membrane, external ear and ear canal normal.   Left Ear: Tympanic membrane, external ear and ear canal normal.   Nose: Rhinorrhea (scant mucous) present. Right sinus exhibits no maxillary sinus tenderness and no frontal sinus tenderness. Left sinus exhibits no maxillary sinus tenderness and no frontal sinus tenderness.   Mouth/Throat: Uvula is midline and mucous membranes are normal. Posterior oropharyngeal erythema: cobblestoning and clear post nasal drip, minimal erythema. Tonsils are 1+ on the right. Tonsils are 1+ on the left. No tonsillar exudate.   Eyes: Conjunctivae, EOM and lids are normal. Pupils are equal, round, and reactive to light.   Neck: Trachea normal and normal range of motion. Neck supple.   Cardiovascular: Normal rate, regular rhythm, S1 normal, S2 normal and normal heart sounds.  "   Pulmonary/Chest: Effort normal and breath sounds normal. She has no wheezes. She has no rhonchi. She has no rales.   Lymphadenopathy:     She has no cervical adenopathy.   Neurological: She is alert and oriented to person, place, and time. Coordination and gait normal.   Skin: Skin is warm, dry and intact.   Psychiatric: She has a normal mood and affect. Her speech is normal and behavior is normal.   Vitals reviewed.      Assessment/Plan     Vanesa was seen today for cough and nasal congestion.    Diagnoses and all orders for this visit:    Viral upper respiratory infection    Other orders  -     brompheniramine-pseudoephedrine-DM 30-2-10 MG/5ML syrup; 1 or 2 tsp every six hours as needed for drainage, cough, congestion      Use Bromfed as prescribed for drainage, cough, congestion. Use caution as it may make you drowsy.  Increase fluid intake. If symptoms persist or worsen return for reevaluation or go to urgent care.

## 2017-08-19 NOTE — PATIENT INSTRUCTIONS
"Upper Respiratory Infection, Adult  Most upper respiratory infections (URIs) are a viral infection of the air passages leading to the lungs. A URI affects the nose, throat, and upper air passages. The most common type of URI is nasopharyngitis and is typically referred to as \"the common cold.\"  URIs run their course and usually go away on their own. Most of the time, a URI does not require medical attention, but sometimes a bacterial infection in the upper airways can follow a viral infection. This is called a secondary infection. Sinus and middle ear infections are common types of secondary upper respiratory infections.  Bacterial pneumonia can also complicate a URI. A URI can worsen asthma and chronic obstructive pulmonary disease (COPD). Sometimes, these complications can require emergency medical care and may be life threatening.   CAUSES  Almost all URIs are caused by viruses. A virus is a type of germ and can spread from one person to another.   RISKS FACTORS  You may be at risk for a URI if:   · You smoke.    · You have chronic heart or lung disease.  · You have a weakened defense (immune) system.    · You are very young or very old.    · You have nasal allergies or asthma.  · You work in crowded or poorly ventilated areas.  · You work in health care facilities or schools.  SIGNS AND SYMPTOMS   Symptoms typically develop 2-3 days after you come in contact with a cold virus. Most viral URIs last 7-10 days. However, viral URIs from the influenza virus (flu virus) can last 14-18 days and are typically more severe. Symptoms may include:   · Runny or stuffy (congested) nose.    · Sneezing.    · Cough.    · Sore throat.    · Headache.    · Fatigue.    · Fever.    · Loss of appetite.    · Pain in your forehead, behind your eyes, and over your cheekbones (sinus pain).  · Muscle aches.    DIAGNOSIS   Your health care provider may diagnose a URI by:  · Physical exam.  · Tests to check that your symptoms are not due to " another condition such as:  ¨ Strep throat.  ¨ Sinusitis.  ¨ Pneumonia.  ¨ Asthma.  TREATMENT   A URI goes away on its own with time. It cannot be cured with medicines, but medicines may be prescribed or recommended to relieve symptoms. Medicines may help:  · Reduce your fever.  · Reduce your cough.  · Relieve nasal congestion.  HOME CARE INSTRUCTIONS   · Take medicines only as directed by your health care provider.    · Gargle warm saltwater or take cough drops to comfort your throat as directed by your health care provider.  · Use a warm mist humidifier or inhale steam from a shower to increase air moisture. This may make it easier to breathe.  · Drink enough fluid to keep your urine clear or pale yellow.    · Eat soups and other clear broths and maintain good nutrition.    · Rest as needed.    · Return to work when your temperature has returned to normal or as your health care provider advises. You may need to stay home longer to avoid infecting others. You can also use a face mask and careful hand washing to prevent spread of the virus.  · Increase the usage of your inhaler if you have asthma.    · Do not use any tobacco products, including cigarettes, chewing tobacco, or electronic cigarettes. If you need help quitting, ask your health care provider.  PREVENTION   The best way to protect yourself from getting a cold is to practice good hygiene.   · Avoid oral or hand contact with people with cold symptoms.    · Wash your hands often if contact occurs.    There is no clear evidence that vitamin C, vitamin E, echinacea, or exercise reduces the chance of developing a cold. However, it is always recommended to get plenty of rest, exercise, and practice good nutrition.   SEEK MEDICAL CARE IF:   · You are getting worse rather than better.    · Your symptoms are not controlled by medicine.    · You have chills.  · You have worsening shortness of breath.  · You have brown or red mucus.  · You have yellow or brown nasal  discharge.  · You have pain in your face, especially when you bend forward.  · You have a fever.  · You have swollen neck glands.  · You have pain while swallowing.  · You have white areas in the back of your throat.  SEEK IMMEDIATE MEDICAL CARE IF:   · You have severe or persistent:    Headache.    Ear pain.    Sinus pain.    Chest pain.  · You have chronic lung disease and any of the following:    Wheezing.    Prolonged cough.    Coughing up blood.    A change in your usual mucus.  · You have a stiff neck.  · You have changes in your:    Vision.    Hearing.    Thinking.    Mood.  MAKE SURE YOU:   · Understand these instructions.  · Will watch your condition.  · Will get help right away if you are not doing well or get worse.     This information is not intended to replace advice given to you by your health care provider. Make sure you discuss any questions you have with your health care provider.     Document Released: 06/13/2002 Document Revised: 05/03/2016 Document Reviewed: 03/25/2015  [employer] Interactive Patient Education ©2017 [employer]    Brompheniramine; Dextromethorphan; Pseudoephedrine oral solution  What is this medicine?  BROMPHENIRAMINE; DEXTROMETHORPHAN; PSEUDOEPHEDRINE (brome fen IR a meen; dex troe meth OR fan; andry tobar e FED rin) is a histamine blocker, cough suppressant, and a decongestant. It can help relieve cough, runny nose, stuffy nose, sneezing, and itchy or watery eyes. This medicine is used to treat allergy and cold symptoms. This medicine will not treat an infection.  This medicine may be used for other purposes; ask your health care provider or pharmacist if you have questions.  COMMON BRAND NAME(S): Anaplex, Andehist DM, Andehist DM NR, Brom/PSE/DM Cough, Bromaline DM, Bromatane DX, Bromaxefed DM RF, Bromdex D, Brometane DX, Bromfed-DM, Bromhist DM, Bromhist PDX, Bromophed DX, Bromphenex DM, Bromplex DM, Brotapp-DM, BroveX PSB DM, Carbofed DM, Cardec DM, Dallergy DM, Decon DM,  Dimetane DX, Dimetapp Children's DM Cold and Cough, Dynatuss, EndaCof-DM, LoHist PSB DM, Myphetane DX, Jairo DM, PBM Allergy, PediaHist DM, Q-Elisa DM, Robitussin Cough and Allergy, Rondamine DM, Rondec DM, Sildec DM, Tuss Mine DM  What should I tell my health care provider before I take this medicine?  They need to know if you have any of these conditions:  -asthma  -blood vessel disease  -diabetes  -difficulty passing urine  -glaucoma  -high blood pressure  -other chronic disease  -stomach ulcer  -taken an MAOI like Carbex, Eldepryl, Marplan, Nardil, or Parnate in last 14 days  -thyroid disease  -an unusual or allergic reaction to brompheniramine, dextromethorphan, pseudoephedrine, other medicines, foods, dyes, or preservatives  -pregnant or trying to get pregnant  -breast-feeding  How should I use this medicine?  Take this medicine by mouth with a full glass of water. Follow the directions on the prescription label. Use a specially marked spoon or container to measure your medicine. Household spoons are not accurate. Take this medicine with food or milk if it upsets your stomach. Take your doses at regular times. Do not take more medicine than directed.  Talk to your pediatrician regarding the use of this medicine in children. While this drug may be prescribed for children as young as 2 years old for selected conditions, precautions do apply.  Patients over 60 years old may have a stronger reaction to this medicine and need smaller doses.  Overdosage: If you think you have taken too much of this medicine contact a poison control center or emergency room at once.  NOTE: This medicine is only for you. Do not share this medicine with others.  What if I miss a dose?  If you miss a dose, take it as soon as you can. If it is almost time for your next dose, take only that dose. Do not take double or extra doses.  What may interact with this medicine?  Do not take this medicine with any of the following medications:  -MAOIs  like Carbex, Eldepryl, Marplan, Nardil, and Parnate  This medicine may also interact with the following medications:  -any product that contains alcohol  -any stimulant drug  -barbiturates  -mecamylamine  -medicines for anxiety or sleep  -medicines for chest pain, heart disease, blood pressure or heart rhythm problems  -medicines for colds or allergies  -medicines for depression, anxiety, or psychotic disturbances  -reserpine  -some herbal or nutritional supplements  -some medicines for pain  -some medicines for Parkinson's disease  This list may not describe all possible interactions. Give your health care provider a list of all the medicines, herbs, non-prescription drugs, or dietary supplements you use. Also tell them if you smoke, drink alcohol, or use illegal drugs. Some items may interact with your medicine.  What should I watch for while using this medicine?  Tell your doctor or health care professional if your symptoms do not improve or if they get worse. If you have trouble falling asleep at night, take the last dose of the day at least a few hours before bedtime.  You may get drowsy or dizzy. Do not drive, use machinery, or do anything that needs mental alertness until you know how this medicine affects you. To reduce the risk of dizzy or fainting spells, do not stand or sit up quickly, especially if you are an older patient. Alcohol may increase dizziness and drowsiness. Avoid alcoholic drinks.  Your mouth may get dry. Chewing sugarless gum or sucking hard candy, and drinking plenty of water may help. Contact your doctor if the problem does not go away or is severe.  This medicine may cause dry eyes and blurred vision. If you wear contact lenses you may feel some discomfort. Lubricating drops may help. See your eye doctor if the problem does not go away or is severe.  What side effects may I notice from receiving this medicine?  Side effects that you should report to your doctor or health care  professional as soon as possible:  -allergic reactions like skin rash, itching or hives, swelling of the face, lips, or tongue  -breathing problems  -changes in vision  -fast or irregular heartbeat  -feeling faint or lightheaded, falls  -hallucinations  -high blood pressure  -seizure  -trouble passing urine or change in the amount of urine  -vomiting  Side effects that usually do not require medical attention (report to your doctor or health care professional if they continue or are bothersome):  -anxiety  -diarrhea  -headache  -loss of appetite  -nausea  This list may not describe all possible side effects. Call your doctor for medical advice about side effects. You may report side effects to FDA at 1-787-FDA-6151.  Where should I keep my medicine?  Keep out of the reach of children.  Store between 8 and 30 degrees C (46 and 86 degrees F). Protect from heat and light. Throw away any unused medicine after the expiration date.  NOTE: This sheet is a summary. It may not cover all possible information. If you have questions about this medicine, talk to your doctor, pharmacist, or health care provider.     © 2017, [employer]. (2009-03-19 13:58:07)    Use Bromfed as prescribed for drainage, cough, congestion. Use caution as it may make you drowsy.  Increase fluid intake. If symptoms persist or worsen return for reevaluation or go to urgent care.

## 2017-08-24 RX ORDER — TAMOXIFEN CITRATE 20 MG/1
20 TABLET ORAL DAILY
Qty: 90 TABLET | Refills: 3 | Status: SHIPPED | OUTPATIENT
Start: 2017-08-24 | End: 2018-05-07

## 2017-10-02 ENCOUNTER — LAB (OUTPATIENT)
Dept: LAB | Facility: HOSPITAL | Age: 42
End: 2017-10-02

## 2017-10-02 ENCOUNTER — OFFICE VISIT (OUTPATIENT)
Dept: ONCOLOGY | Facility: CLINIC | Age: 42
End: 2017-10-02

## 2017-10-02 VITALS
WEIGHT: 132.4 LBS | TEMPERATURE: 98.3 F | HEIGHT: 63 IN | HEART RATE: 88 BPM | RESPIRATION RATE: 16 BRPM | BODY MASS INDEX: 23.46 KG/M2 | DIASTOLIC BLOOD PRESSURE: 78 MMHG | OXYGEN SATURATION: 98 % | SYSTOLIC BLOOD PRESSURE: 128 MMHG

## 2017-10-02 DIAGNOSIS — C50.919 MALIGNANT NEOPLASM OF FEMALE BREAST, UNSPECIFIED ESTROGEN RECEPTOR STATUS, UNSPECIFIED LATERALITY, UNSPECIFIED SITE OF BREAST (HCC): Primary | ICD-10-CM

## 2017-10-02 DIAGNOSIS — C50.919: Primary | ICD-10-CM

## 2017-10-02 DIAGNOSIS — C50.912 MALIGNANT NEOPLASM OF LEFT BREAST GREATER THAN OR EQUAL TO 2 CM IN GREATEST DIMENSION (HCC): Primary | ICD-10-CM

## 2017-10-02 LAB
ALBUMIN SERPL-MCNC: 4.3 G/DL (ref 3.5–5)
ALBUMIN/GLOB SERPL: 1.4 G/DL (ref 1.1–2.5)
ALP SERPL-CCNC: 40 U/L (ref 24–120)
ALT SERPL W P-5'-P-CCNC: 30 U/L (ref 0–54)
ANION GAP SERPL CALCULATED.3IONS-SCNC: 8 MMOL/L (ref 4–13)
AST SERPL-CCNC: 30 U/L (ref 7–45)
AUTO MIXED CELLS #: 0.5 10*3/MM3 (ref 0.1–2.6)
AUTO MIXED CELLS %: 8 % (ref 0.1–24)
BILIRUB SERPL-MCNC: 0.6 MG/DL (ref 0.1–1)
BUN BLD-MCNC: 12 MG/DL (ref 5–21)
BUN/CREAT SERPL: 17.6
CALCIUM SPEC-SCNC: 9.5 MG/DL (ref 8.4–10.4)
CHLORIDE SERPL-SCNC: 102 MMOL/L (ref 98–110)
CO2 SERPL-SCNC: 31 MMOL/L (ref 24–31)
CREAT BLD-MCNC: 0.68 MG/DL (ref 0.5–1.4)
ERYTHROCYTE [DISTWIDTH] IN BLOOD BY AUTOMATED COUNT: 12.8 % (ref 12–15)
GFR SERPL CREATININE-BSD FRML MDRD: 96 ML/MIN/1.73
GLOBULIN UR ELPH-MCNC: 3 GM/DL
GLUCOSE BLD-MCNC: 89 MG/DL (ref 70–100)
HCT VFR BLD AUTO: 33.8 % (ref 37–47)
HGB BLD-MCNC: 11.4 G/DL (ref 12–16)
HOLD SPECIMEN: NORMAL
LYMPHOCYTES # BLD AUTO: 1.7 10*3/MM3 (ref 0.8–7)
LYMPHOCYTES NFR BLD AUTO: 28.5 % (ref 15–45)
MCH RBC QN AUTO: 28.6 PG (ref 28–32)
MCHC RBC AUTO-ENTMCNC: 33.7 G/DL (ref 33–36)
MCV RBC AUTO: 84.9 FL (ref 82–98)
NEUTROPHILS # BLD AUTO: 3.8 10*3/MM3 (ref 1.5–8.3)
NEUTROPHILS NFR BLD AUTO: 63.5 % (ref 39–78)
PLATELET # BLD AUTO: 203 10*3/MM3 (ref 130–400)
PMV BLD AUTO: 9.4 FL (ref 6–12)
POTASSIUM BLD-SCNC: 3.8 MMOL/L (ref 3.5–5.3)
PROT SERPL-MCNC: 7.3 G/DL (ref 6.3–8.7)
RBC # BLD AUTO: 3.98 10*6/MM3 (ref 4.2–5.4)
SODIUM BLD-SCNC: 141 MMOL/L (ref 135–145)
WBC NRBC COR # BLD: 6 10*3/MM3 (ref 4.8–10.8)

## 2017-10-02 PROCEDURE — 80053 COMPREHEN METABOLIC PANEL: CPT | Performed by: INTERNAL MEDICINE

## 2017-10-02 PROCEDURE — 36415 COLL VENOUS BLD VENIPUNCTURE: CPT

## 2017-10-02 PROCEDURE — 99214 OFFICE O/P EST MOD 30 MIN: CPT | Performed by: INTERNAL MEDICINE

## 2017-10-02 PROCEDURE — 85025 COMPLETE CBC W/AUTO DIFF WBC: CPT | Performed by: INTERNAL MEDICINE

## 2017-10-02 RX ORDER — FERROUS SULFATE 325(65) MG
325 TABLET ORAL
Qty: 30 TABLET | Refills: 2 | Status: SHIPPED | OUTPATIENT
Start: 2017-10-02 | End: 2021-08-24

## 2017-10-02 RX ORDER — ANASTROZOLE 1 MG/1
1 TABLET ORAL DAILY
Qty: 30 TABLET | Refills: 5 | Status: SHIPPED | OUTPATIENT
Start: 2017-10-02 | End: 2018-05-07 | Stop reason: SDUPTHER

## 2017-10-02 NOTE — PROGRESS NOTES
"[facility]  HEMATOLOGY & ONCOLOGY        Subjective     VISIT DIAGNOSIS:   No diagnosis found.    REASON FOR VISIT:   No chief complaint on file.       HEMATOLOGY / ONCOLOGY HISTORY: Ms. Manuel is a 40 year old female patient, stage IIA breast carcinoma for which she underwent neoadjuvant chemotherapy with four  cycles of Adriamycin, Cytoxan followed by 12 weekly courses of Taxol. She did have ER positive, [place] positive, HER2/  deepika negative  disease and her BRCA testing was also negative. She underwent bilateral mastectomies, this was followed by reconstruction. She has  also been on Tamoxifen for hormonal manipulation since September 2014 and tolerating it well.   No history exists.       Cancer Staging Information:  No matching staging information was found for the patient.      INTERVAL HISTORY  Patient ID: Vanesa Manuel is a 42 y.o. year old female         Review of Systems   Constitutional: Negative.    HENT: Negative.    Eyes: Negative.    Respiratory: Negative.    Cardiovascular: Negative.    Gastrointestinal: Negative.    Endocrine: Negative.    Genitourinary: Negative.    Musculoskeletal: Negative.    Skin: Negative.    Allergic/Immunologic: Negative.    Neurological: Negative.    Hematological: Negative.    Psychiatric/Behavioral: Negative.             Medications:    Current Outpatient Prescriptions   Medication Sig Dispense Refill   • tamoxifen (NOLVADEX) 20 MG chemo tablet Take 1 tablet by mouth Daily. 90 tablet 3   • venlafaxine XR (EFFEXOR-XR) 75 MG 24 hr capsule        No current facility-administered medications for this visit.        ALLERGIES:    Allergies   Allergen Reactions   • Oxycodone-Acetaminophen Itching     \"felt weird\"   • Percocet [Oxycodone-Acetaminophen]        Objective      @VITALS    Current Status 10/2/2017   ECOG score 0       General Appearance: Patient is awake, alert, oriented and in no acute distress. Patient is welldeveloped, wellnourished, and appears stated " age.  HEENT: Normocephalic. Sclerae clear, conjunctiva pink, extraocular movements intact, pupils, round, reactive to light and  accommodation. Mouth and throat are clear with moist oral mucosa.  NECK: Supple, no jugular venous distention, thyroid not enlarged.  LYMPH: No cervical, supraclavicular, axillary, or inguinal lymphadenopathy.  CHEST: Equal bilateral expansion, AP  diameter normal, resonant percussion note  LUNGS: Good air movement, no rales, rhonchi, rubs or wheezes with auscultation  CARDIO: Regular sinus rhythm, no murmurs, gallops or rubs.  ABDOMEN: Nondistended, soft, No tenderness, no guarding, no rebound, No hepatosplenomegaly. No abdominal masses. Bowel sounds positive. No hernia  GENITALIA: Not examined.  BREASTS: Not examined.  MUSKEL: No joint swelling, decreased motion, or inflammation  EXTREMS: No edema, clubbing, cyanosis, No varicose veins.  NEURO: Grossly nonfocal, Gait is coordinated and smooth, Cognition is preserved.  SKIN: No rashes, no ecchymoses, no petechia.  PSYCH: Oriented to time, place and person. Memory is preserved. Mood and affect appear normal      RECENT LABS:  Orders Only on 10/02/2017   Component Date Value Ref Range Status   • Glucose 10/02/2017 89  70 - 100 mg/dL Final   • BUN 10/02/2017 12  5 - 21 mg/dL Final   • Creatinine 10/02/2017 0.68  0.50 - 1.40 mg/dL Final   • Sodium 10/02/2017 141  135 - 145 mmol/L Final   • Potassium 10/02/2017 3.8  3.5 - 5.3 mmol/L Final   • Chloride 10/02/2017 102  98 - 110 mmol/L Final   • CO2 10/02/2017 31.0  24.0 - 31.0 mmol/L Final   • Calcium 10/02/2017 9.5  8.4 - 10.4 mg/dL Final   • Total Protein 10/02/2017 7.3  6.3 - 8.7 g/dL Final   • Albumin 10/02/2017 4.30  3.50 - 5.00 g/dL Final   • ALT (SGPT) 10/02/2017 30  0 - 54 U/L Final   • AST (SGOT) 10/02/2017 30  7 - 45 U/L Final   • Alkaline Phosphatase 10/02/2017 40  24 - 120 U/L Final   • Total Bilirubin 10/02/2017 0.6  0.1 - 1.0 mg/dL Final   • eGFR Non African Amer 10/02/2017 96  >60  mL/min/1.73 Final   • Globulin 10/02/2017 3.0  gm/dL Final   • A/G Ratio 10/02/2017 1.4  1.1 - 2.5 g/dL Final   • BUN/Creatinine Ratio 10/02/2017 17.6    Final   • Anion Gap 10/02/2017 8.0  4.0 - 13.0 mmol/L Final   • WBC 10/02/2017 6.00  4.80 - 10.80 10*3/mm3 Final   • RBC 10/02/2017 3.98* 4.20 - 5.40 10*6/mm3 Final   • Hemoglobin 10/02/2017 11.4* 12.0 - 16.0 g/dL Final   • Hematocrit 10/02/2017 33.8* 37.0 - 47.0 % Final   • MCV 10/02/2017 84.9  82.0 - 98.0 fL Final   • MCH 10/02/2017 28.6  28.0 - 32.0 pg Final   • MCHC 10/02/2017 33.7  33.0 - 36.0 g/dL Final   • RDW 10/02/2017 12.8  12.0 - 15.0 % Final   • MPV 10/02/2017 9.4  6.0 - 12.0 fL Final   • Platelets 10/02/2017 203  130 - 400 10*3/mm3 Final   • Neutrophil % 10/02/2017 63.5  39.0 - 78.0 % Final   • Lymphocyte % 10/02/2017 28.5  15.0 - 45.0 % Final   • Auto Mixed Cells % 10/02/2017 8.0  0.1 - 24.0 % Final   • Neutrophils, Absolute 10/02/2017 3.80  1.50 - 8.30 10*3/mm3 Final   • Lymphocytes, Absolute 10/02/2017 1.70  0.80 - 7.00 10*3/mm3 Final   • Auto Mixed Cells # 10/02/2017 0.50  0.10 - 2.60 10*3/mm3 Final       RADIOLOGY:  No results found.         Assessment/Plan      Ms. Manuel is a 40 year old female patient, stage IIA breast carcinoma 2014, for which she underwent neoadjuvant chemotherapy with four  cycles of Adriamycin, Cytoxan followed by 12 weekly courses of Taxol. She did have ER positive, [place] positive, HER2/  deepika negative  disease and her BRCA testing was also negative. She underwent bilateral mastectomies, this was followed by reconstruction. She has  also been on Tamoxifen.  She is Pre menupausal and must take Gosrelin or Lupron with Tamoxifen for Ovarian supression, unless she opts for HSO for which I have encouraged her to talk to her OB.  Her estradiol levels drawn back in November 30 were on the higher side at 75.  Of course she is premenopausal and I need to treat her with Lupron or Gosrelin to stop the ovaries from estrogen production  unless she decides to get a hysterosalpinoopherectomy, for this she is trying to find a gynecologist.      She underwent HSO in May 2017 and is now Post menopausal , therefore, will switch her from Tamoxifen to AI Arimidex 1mg q day.  She is also mildly anemia, will call in Feso4 325mg q day x 3 months.  Subtle Vasomotor phenomena.      Keagan Lindsey MD    10/2/2017    3:46 PM

## 2017-10-11 ENCOUNTER — OFFICE VISIT (OUTPATIENT)
Dept: SURGERY | Age: 42
End: 2017-10-11
Payer: MEDICAID

## 2017-10-11 VITALS
TEMPERATURE: 98.2 F | DIASTOLIC BLOOD PRESSURE: 88 MMHG | WEIGHT: 136 LBS | BODY MASS INDEX: 23.22 KG/M2 | SYSTOLIC BLOOD PRESSURE: 138 MMHG | HEIGHT: 64 IN

## 2017-10-11 DIAGNOSIS — Z90.13 S/P BILATERAL MASTECTOMY: Primary | ICD-10-CM

## 2017-10-11 PROCEDURE — 99213 OFFICE O/P EST LOW 20 MIN: CPT | Performed by: SURGERY

## 2017-10-11 RX ORDER — FERROUS SULFATE 325(65) MG
TABLET ORAL
Refills: 2 | COMMUNITY
Start: 2017-10-02 | End: 2019-05-30

## 2017-10-11 RX ORDER — ANASTROZOLE 1 MG/1
TABLET ORAL
Refills: 5 | COMMUNITY
Start: 2017-10-02 | End: 2019-05-30

## 2017-10-12 ENCOUNTER — OFFICE VISIT (OUTPATIENT)
Dept: OBGYN | Age: 42
End: 2017-10-12
Payer: MEDICAID

## 2017-10-12 VITALS
SYSTOLIC BLOOD PRESSURE: 165 MMHG | HEART RATE: 80 BPM | BODY MASS INDEX: 23.05 KG/M2 | RESPIRATION RATE: 18 BRPM | WEIGHT: 135 LBS | DIASTOLIC BLOOD PRESSURE: 95 MMHG | TEMPERATURE: 97.8 F | HEIGHT: 64 IN

## 2017-10-12 DIAGNOSIS — Z01.42 ENCOUNTER FOR PAPANICOLAOU CERVICAL SMEAR TO CONFIRM FINDINGS OF RECENT NORMAL SMEAR FOLLOWING INITIAL ABNORMAL SMEAR: Primary | ICD-10-CM

## 2017-10-12 DIAGNOSIS — I10 ESSENTIAL HYPERTENSION: ICD-10-CM

## 2017-10-12 PROCEDURE — 99213 OFFICE O/P EST LOW 20 MIN: CPT

## 2017-10-12 NOTE — PROGRESS NOTES
Chris Peterson is a 38 y.o. female who presents today for her medical conditions/ complaints as noted below. Chris Screen is c/o of Abnormal Pap Smear (Patient presents today for repeat pap.)        HPI    Last mammogram n/a  Last pap 2017  Contraception hystroectomy  Last bone density none  Last colonoscopy 10/28/2015  Patient's last menstrual period was 2017.   [id number]    Past Medical History:   Diagnosis Date    BRCA negative 2017    My risk    Breast cancer ([address] 2014    Chemo-Hormone fed tumor (Dr. Jayshree Miller)    Genetic testing of female     BRCCA Negative    PONV (postoperative nausea and vomiting)      Past Surgical History:   Procedure Laterality Date    BREAST BIOPSY Left 14    mammotome     BREAST SURGERY  14    bilateral mastectomy on 14    BREAST SURGERY  14    bilateral tissue expanders placed    BREAST SURGERY      Fat Graphing     SECTION      COLONOSCOPY  2015    ENDOMETRIAL BIOPSY  2016    done for bleeding, report is benign    HYSTERECTOMY VAGINAL N/A 2017    HYSTERECTOMY VAGINAL LAPAROSCOPIC ASSISTED (LAVH) performed by Prateek Richmond MD at [address] Left 14    axilla    SALPINGO-OOPHORECTOMY Bilateral 2017    SALPINGO OOPHORECTOMY LAPAROSCOPIC performed by Prateek Richmond MD at [address] [facility] TUNNELED VENOUS PORT PLACEMENT  14    WISDOM TOOTH EXTRACTION       Family History   Problem Relation Age of Onset    Colon Cancer Paternal Uncle 61    Colon Polyps Paternal Uncle     Breast Cancer Maternal Aunt 79    High Blood Pressure Mother     High Cholesterol Father     Heart Disease Paternal Grandfather     Uterine Cancer Maternal Grandmother 39    Esophageal Cancer Neg Hx     Liver Cancer Neg Hx     Liver Disease Neg Hx     Rectal Cancer Neg Hx     Stomach Cancer Neg Hx      Social History   Substance Use Topics    Smoking status: Former Smoker     Types: Cigarettes     Quit date: 5/12/2008    Smokeless tobacco: Never Used    Alcohol use 0.0 oz/week      Comment: OCC       Current Outpatient Prescriptions   Medication Sig Dispense Refill    anastrozole (ARIMIDEX) 1 MG tablet TAKE 1 TABLET BY MOUTH DAILY. 5    ferrous sulfate 325 (65 Fe) MG tablet TAKE 1 TABLET BY MOUTH DAILY WITH BREAKFAST.  2    venlafaxine (EFFEXOR XR) 75 MG extended release capsule Take 1 capsule by mouth daily (Patient taking differently: Take 75 mg by mouth nightly Indications: HOT FLASHES ) 30 capsule 11     No current facility-administered medications for this visit. Allergies   Allergen Reactions    Percocet [Oxycodone-Acetaminophen] Itching     \"felt weird\"     Vitals:    10/12/17 1514   BP: (!) 165/95   Pulse: 80   Resp:    Temp:      Body mass index is 23.17 kg/m². Review of Systems      Physical Exam   Constitutional: She is oriented to person, place, and time. She appears well-developed and well-nourished. HENT:   Head: Normocephalic and atraumatic. Eyes: Pupils are equal, round, and reactive to light. Neck: Normal range of motion. Genitourinary:   Genitourinary Comments: Repeat pap smear obtained and vaginal cuff intact. Uterus, cervix and ovaries surgically absent. Musculoskeletal: Normal range of motion. Neurological: She is alert and oriented to person, place, and time. Skin: Skin is warm and dry. Psychiatric: She has a normal mood and affect. Her behavior is normal.       1. Encounter for Papanicolaou cervical smear to confirm findings of recent normal smear following initial abnormal smear  PAP SMEAR   2. Essential hypertension         MEDICATIONS:  No orders of the defined types were placed in this encounter. ORDERS:  Orders Placed This Encounter   Procedures    PAP SMEAR       PLAN:  There are no Patient Instructions on file for this visit. 1.  Repeat pap smear to pathology. 2.  Pt to follow up for annual in one year.   3.  Pt to follow up with Dr. Valentino Castro related to BP readings.

## 2018-01-02 DIAGNOSIS — C50.919 MALIGNANT NEOPLASM OF FEMALE BREAST, UNSPECIFIED ESTROGEN RECEPTOR STATUS, UNSPECIFIED LATERALITY, UNSPECIFIED SITE OF BREAST (HCC): Primary | ICD-10-CM

## 2018-01-04 ENCOUNTER — OFFICE VISIT (OUTPATIENT)
Dept: ONCOLOGY | Facility: CLINIC | Age: 43
End: 2018-01-04

## 2018-01-04 ENCOUNTER — LAB (OUTPATIENT)
Dept: LAB | Facility: HOSPITAL | Age: 43
End: 2018-01-04

## 2018-01-04 VITALS
WEIGHT: 135.2 LBS | SYSTOLIC BLOOD PRESSURE: 138 MMHG | BODY MASS INDEX: 23.96 KG/M2 | RESPIRATION RATE: 18 BRPM | HEIGHT: 63 IN | TEMPERATURE: 97.4 F | DIASTOLIC BLOOD PRESSURE: 76 MMHG | OXYGEN SATURATION: 98 % | HEART RATE: 81 BPM

## 2018-01-04 DIAGNOSIS — C50.919: Primary | ICD-10-CM

## 2018-01-04 DIAGNOSIS — C50.919 MALIGNANT NEOPLASM OF FEMALE BREAST, UNSPECIFIED ESTROGEN RECEPTOR STATUS, UNSPECIFIED LATERALITY, UNSPECIFIED SITE OF BREAST (HCC): Primary | ICD-10-CM

## 2018-01-04 LAB
ALBUMIN SERPL-MCNC: 4.5 G/DL (ref 3.5–5)
ALBUMIN/GLOB SERPL: 1.4 G/DL (ref 1.1–2.5)
ALP SERPL-CCNC: 53 U/L (ref 24–120)
ALT SERPL W P-5'-P-CCNC: 41 U/L (ref 0–54)
ANION GAP SERPL CALCULATED.3IONS-SCNC: 7 MMOL/L (ref 4–13)
AST SERPL-CCNC: 32 U/L (ref 7–45)
AUTO MIXED CELLS #: 0.7 10*3/MM3 (ref 0.1–2.6)
AUTO MIXED CELLS %: 11.7 % (ref 0.1–24)
BILIRUB SERPL-MCNC: 0.6 MG/DL (ref 0.1–1)
BUN BLD-MCNC: 10 MG/DL (ref 5–21)
BUN/CREAT SERPL: 16.1
CALCIUM SPEC-SCNC: 9.4 MG/DL (ref 8.4–10.4)
CHLORIDE SERPL-SCNC: 100 MMOL/L (ref 98–110)
CO2 SERPL-SCNC: 32 MMOL/L (ref 24–31)
CREAT BLD-MCNC: 0.62 MG/DL (ref 0.5–1.4)
ERYTHROCYTE [DISTWIDTH] IN BLOOD BY AUTOMATED COUNT: 12.5 % (ref 12–15)
FERRITIN SERPL-MCNC: 17.8 NG/ML (ref 6.24–137)
GFR SERPL CREATININE-BSD FRML MDRD: 107 ML/MIN/1.73
GLOBULIN UR ELPH-MCNC: 3.3 GM/DL
GLUCOSE BLD-MCNC: 96 MG/DL (ref 70–100)
HCT VFR BLD AUTO: 34.7 % (ref 37–47)
HGB BLD-MCNC: 11.6 G/DL (ref 12–16)
HOLD SPECIMEN: NORMAL
IRON 24H UR-MRATE: 65 MCG/DL (ref 42–180)
IRON SATN MFR SERPL: 20 % (ref 20–45)
LYMPHOCYTES # BLD AUTO: 1.7 10*3/MM3 (ref 0.8–7)
LYMPHOCYTES NFR BLD AUTO: 28.5 % (ref 15–45)
MCH RBC QN AUTO: 28.4 PG (ref 28–32)
MCHC RBC AUTO-ENTMCNC: 33.4 G/DL (ref 33–36)
MCV RBC AUTO: 84.8 FL (ref 82–98)
NEUTROPHILS # BLD AUTO: 3.5 10*3/MM3 (ref 1.5–8.3)
NEUTROPHILS NFR BLD AUTO: 59.8 % (ref 39–78)
PLATELET # BLD AUTO: 247 10*3/MM3 (ref 130–400)
PMV BLD AUTO: 8.6 FL (ref 6–12)
POTASSIUM BLD-SCNC: 3.6 MMOL/L (ref 3.5–5.3)
PROT SERPL-MCNC: 7.8 G/DL (ref 6.3–8.7)
RBC # BLD AUTO: 4.09 10*6/MM3 (ref 4.2–5.4)
SODIUM BLD-SCNC: 139 MMOL/L (ref 135–145)
TIBC SERPL-MCNC: 318 MCG/DL (ref 225–420)
WBC NRBC COR # BLD: 5.9 10*3/MM3 (ref 4.8–10.8)

## 2018-01-04 PROCEDURE — 83550 IRON BINDING TEST: CPT | Performed by: INTERNAL MEDICINE

## 2018-01-04 PROCEDURE — 85025 COMPLETE CBC W/AUTO DIFF WBC: CPT | Performed by: INTERNAL MEDICINE

## 2018-01-04 PROCEDURE — 82728 ASSAY OF FERRITIN: CPT | Performed by: INTERNAL MEDICINE

## 2018-01-04 PROCEDURE — 83540 ASSAY OF IRON: CPT | Performed by: INTERNAL MEDICINE

## 2018-01-04 PROCEDURE — 36415 COLL VENOUS BLD VENIPUNCTURE: CPT

## 2018-01-04 PROCEDURE — 80053 COMPREHEN METABOLIC PANEL: CPT | Performed by: INTERNAL MEDICINE

## 2018-01-04 PROCEDURE — 99214 OFFICE O/P EST MOD 30 MIN: CPT | Performed by: INTERNAL MEDICINE

## 2018-01-04 NOTE — PROGRESS NOTES
[facility]  HEMATOLOGY & ONCOLOGY        Subjective     VISIT DIAGNOSIS:   Encounter Diagnosis   Name Primary?   • Malignant neoplasm of breast greater than or equal to 2 cm in greatest dimension, unspecified laterality Yes       REASON FOR VISIT:     Chief Complaint   Patient presents with   • Follow-up     Breast Ca: She is here for f/u visit today        HEMATOLOGY / ONCOLOGY HISTORY: Ms. Manuel is a 40 year old female patient, stage IIA breast carcinoma for which she underwent neoadjuvant chemotherapy with four  cycles of Adriamycin, Cytoxan followed by 12 weekly courses of Taxol. She did have ER positive, [place] positive, HER2/  deepika negative  disease and her BRCA testing was also negative. She underwent bilateral mastectomies, this was followed by reconstruction. She has  also been on Tamoxifen for hormonal manipulation since September 2014 and tolerating it well.   No history exists.       Cancer Staging Information:  No matching staging information was found for the patient.      INTERVAL HISTORY  Patient ID: Vanesa Manuel is a 42 y.o. year old female         Review of Systems   Constitutional: Negative.    HENT: Negative.    Eyes: Negative.    Respiratory: Negative.    Cardiovascular: Negative.    Gastrointestinal: Negative.    Endocrine: Negative.    Genitourinary: Negative.    Musculoskeletal: Negative.    Skin: Negative.    Allergic/Immunologic: Negative.    Neurological: Negative.    Hematological: Negative.    Psychiatric/Behavioral: Negative.             Medications:    Current Outpatient Prescriptions   Medication Sig Dispense Refill   • anastrozole (ARIMIDEX) 1 MG tablet Take 1 tablet by mouth Daily. 30 tablet 5   • ferrous sulfate 325 (65 FE) MG tablet Take 1 tablet by mouth Daily With Breakfast. 30 tablet 2   • tamoxifen (NOLVADEX) 20 MG chemo tablet Take 1 tablet by mouth Daily. 90 tablet 3   • venlafaxine XR (EFFEXOR-XR) 75 MG 24 hr capsule        No current facility-administered  "medications for this visit.        ALLERGIES:    Allergies   Allergen Reactions   • Oxycodone-Acetaminophen Itching     \"felt weird\"   • Percocet [Oxycodone-Acetaminophen]        Objective      @VITALS    Current Status 10/2/2017   ECOG score 0       General Appearance: Patient is awake, alert, oriented and in no acute distress. Patient is welldeveloped, wellnourished, and appears stated age.  HEENT: Normocephalic. Sclerae clear, conjunctiva pink, extraocular movements intact, pupils, round, reactive to light and  accommodation. Mouth and throat are clear with moist oral mucosa.  NECK: Supple, no jugular venous distention, thyroid not enlarged.  LYMPH: No cervical, supraclavicular, axillary, or inguinal lymphadenopathy.  CHEST: Equal bilateral expansion, AP  diameter normal, resonant percussion note  LUNGS: Good air movement, no rales, rhonchi, rubs or wheezes with auscultation  CARDIO: Regular sinus rhythm, no murmurs, gallops or rubs.  ABDOMEN: Nondistended, soft, No tenderness, no guarding, no rebound, No hepatosplenomegaly. No abdominal masses. Bowel sounds positive. No hernia  GENITALIA: Not examined.  BREASTS: Not examined.  MUSKEL: No joint swelling, decreased motion, or inflammation  EXTREMS: No edema, clubbing, cyanosis, No varicose veins.  NEURO: Grossly nonfocal, Gait is coordinated and smooth, Cognition is preserved.  SKIN: No rashes, no ecchymoses, no petechia.  PSYCH: Oriented to time, place and person. Memory is preserved. Mood and affect appear normal      RECENT LABS:  Orders Only on 01/02/2018   Component Date Value Ref Range Status   • WBC 01/04/2018 5.90  4.80 - 10.80 10*3/mm3 Final   • RBC 01/04/2018 4.09* 4.20 - 5.40 10*6/mm3 Final   • Hemoglobin 01/04/2018 11.6* 12.0 - 16.0 g/dL Final   • Hematocrit 01/04/2018 34.7* 37.0 - 47.0 % Final   • MCV 01/04/2018 84.8  82.0 - 98.0 fL Final   • MCH 01/04/2018 28.4  28.0 - 32.0 pg Final   • MCHC 01/04/2018 33.4  33.0 - 36.0 g/dL Final   • RDW 01/04/2018 " 12.5  12.0 - 15.0 % Final   • MPV 01/04/2018 8.6  6.0 - 12.0 fL Final   • Platelets 01/04/2018 247  130 - 400 10*3/mm3 Final   • Neutrophil % 01/04/2018 59.8  39.0 - 78.0 % Final   • Lymphocyte % 01/04/2018 28.5  15.0 - 45.0 % Final   • Auto Mixed Cells % 01/04/2018 11.7  0.1 - 24.0 % Final   • Neutrophils, Absolute 01/04/2018 3.50  1.50 - 8.30 10*3/mm3 Final   • Lymphocytes, Absolute 01/04/2018 1.70  0.80 - 7.00 10*3/mm3 Final   • Auto Mixed Cells # 01/04/2018 0.70  0.10 - 2.60 10*3/mm3 Final       RADIOLOGY:  No results found.         Assessment/Plan      Ms. Manuel is a 40 year old female patient, stage IIA breast carcinoma 2014, for which she underwent neoadjuvant chemotherapy with four  cycles of Adriamycin, Cytoxan followed by 12 weekly courses of Taxol. She did have ER positive, [place] positive, HER2/  deepika negative  disease and her BRCA testing was also negative. She underwent bilateral mastectomies, this was followed by reconstruction. She has  also been on Tamoxifen.  She is Pre menupausal and must take Gosrelin or Lupron with Tamoxifen for Ovarian supression, unless she opts for HSO for which I have encouraged her to talk to her OB.  Her estradiol levels drawn back in November 30 were on the higher side at 75.  Of course she is premenopausal and I need to treat her with Lupron or Gosrelin to stop the ovaries from estrogen production unless she decides to get a hysterosalpinoopherectomy, for this she is trying to find a gynecologist.      She underwent HSO in May 2017 and is now Post menopausal , therefore, will switch her from Tamoxifen to AI Arimidex 1mg q day.  She is also mildly anemia, will call in Feso4 325mg q day x 3 months.  Subtle Vasomotor phenomena.    Hgb is still low but improved to 11.6 cont on Iron.  Migratory pains in malick off and On pains involoving larger joints as well, which may be a combination of vasomotor phenomena sec teetee Hysterosalphingooopherectomy preformed last may 2017 and perhaps  related to Anastrazole. Will keep her under OBSERVATION.      Keagan Lindsey MD    1/4/2018    3:52 PM

## 2018-01-05 ENCOUNTER — TELEPHONE (OUTPATIENT)
Dept: ONCOLOGY | Facility: CLINIC | Age: 43
End: 2018-01-05

## 2018-01-05 NOTE — TELEPHONE ENCOUNTER
Patient called per Dr Lindsey instructions, and informed to continue with her daily iron tab, and after reviewing and studying her lab values from yesterday and does want to increase the oral iron to bid instead of qd. It was explained why he wanted to increase her oral iron and what side effects might occur if she does increase the iron tabs. She v/u of our conversation, she was encouraged to call if she has problems, questions, or concerns.

## 2018-02-24 ENCOUNTER — OFFICE VISIT (OUTPATIENT)
Dept: URGENT CARE | Age: 43
End: 2018-02-24
Payer: MEDICAID

## 2018-02-24 VITALS
SYSTOLIC BLOOD PRESSURE: 132 MMHG | OXYGEN SATURATION: 99 % | TEMPERATURE: 99.2 F | BODY MASS INDEX: 22.36 KG/M2 | RESPIRATION RATE: 20 BRPM | HEART RATE: 93 BPM | HEIGHT: 64 IN | DIASTOLIC BLOOD PRESSURE: 83 MMHG | WEIGHT: 131 LBS

## 2018-02-24 DIAGNOSIS — J06.9 URI WITH COUGH AND CONGESTION: Primary | ICD-10-CM

## 2018-02-24 DIAGNOSIS — R05.9 COUGH: ICD-10-CM

## 2018-02-24 LAB
INFLUENZA A ANTIBODY: NEGATIVE
INFLUENZA B ANTIBODY: NEGATIVE

## 2018-02-24 PROCEDURE — 87804 INFLUENZA ASSAY W/OPTIC: CPT | Performed by: NURSE PRACTITIONER

## 2018-02-24 PROCEDURE — 99213 OFFICE O/P EST LOW 20 MIN: CPT | Performed by: NURSE PRACTITIONER

## 2018-02-24 RX ORDER — METHYLPREDNISOLONE 4 MG/1
TABLET ORAL
Qty: 1 KIT | Refills: 0 | Status: SHIPPED | OUTPATIENT
Start: 2018-02-24 | End: 2018-03-02

## 2018-02-24 RX ORDER — BENZONATATE 200 MG/1
200 CAPSULE ORAL 3 TIMES DAILY PRN
Qty: 30 CAPSULE | Refills: 0 | Status: SHIPPED | OUTPATIENT
Start: 2018-02-24 | End: 2018-04-11 | Stop reason: ALTCHOICE

## 2018-02-24 ASSESSMENT — ENCOUNTER SYMPTOMS
BACK PAIN: 0
VOICE CHANGE: 0
COUGH: 1
COLOR CHANGE: 0
NAUSEA: 1
SHORTNESS OF BREATH: 0
VOMITING: 0
PHOTOPHOBIA: 0
RHINORRHEA: 0

## 2018-02-24 NOTE — PATIENT INSTRUCTIONS
1. Begin medrol dose pack as directed. 2. Tessalon as needed for cough. 3. May take Mucinex OTC to help thin secretions. 4. Tylenol and motrin as needed for fever. 5. Follow-up with PCP if symptoms worsen or fail to improve. Patient Education        Viral Respiratory Infection: Care Instructions  Your Care Instructions    Viruses are very small organisms. They grow in number after they enter your body. There are many types that cause different illnesses, such as colds and the mumps. The symptoms of a viral respiratory infection often start quickly. They include a fever, sore throat, and runny nose. You may also just not feel well. Or you may not want to eat much. Most viral respiratory infections are not serious. They usually get better with time and self-care. Antibiotics are not used to treat a viral infection. That's because antibiotics will not help cure a viral illness. In some cases, antiviral medicine can help your body fight a serious viral infection. Follow-up care is a key part of your treatment and safety. Be sure to make and go to all appointments, and call your doctor if you are having problems. It's also a good idea to know your test results and keep a list of the medicines you take. How can you care for yourself at home? · Rest as much as possible until you feel better. · Be safe with medicines. Take your medicine exactly as prescribed. Call your doctor if you think you are having a problem with your medicine. You will get more details on the specific medicine your doctor prescribes. · Take an over-the-counter pain medicine, such as acetaminophen (Tylenol), ibuprofen (Advil, Motrin), or naproxen (Aleve), as needed for pain and fever. Read and follow all instructions on the label. Do not give aspirin to anyone younger than 20. It has been linked to Reye syndrome, a serious illness. · Drink plenty of fluids, enough so that your urine is light yellow or clear like water.  Hot fluids, such

## 2018-02-24 NOTE — PROGRESS NOTES
(Temporal)   Resp 20   Ht 5' 4\" (1.626 m)   Wt 131 lb (59.4 kg)   LMP 05/05/2017   SpO2 99%   BMI 22.49 kg/m²     Assessment:      1. URI with cough and congestion     2. Cough  POCT Influenza A/B     Results for orders placed or performed in visit on 02/24/18   POCT Influenza A/B   Result Value Ref Range    Influenza A Ab NEGATIVE     Influenza B Ab NEGATIVE          Plan:     Discussed diagnosis and treatment with patient and family. Explained probable viral nature of illness. Instructed on rest and increase fluids. She is to monitor for fever and treat as needed with acetaminophen and ibuprofen. Advised symptomatic treatment with OTC medications. If patient is not improving or developing any new/worsening symptoms then RTC, prn or go to ER. Patient is to follow up as needed. Patient and family verbalizes understanding. 1. Begin medrol dose pack as directed. 2. Tessalon as needed for cough. 3. May take Mucinex OTC to help thin secretions. 4. Tylenol and motrin as needed for fever. 5. Follow-up with PCP if symptoms worsen or fail to improve. Patient given educational materials - see patient instructions. Discussed use, benefit, and side effects of prescribed medications. All patient questions answered. Pt voiced understanding. Reviewed health maintenance. Instructed to continue current medications, diet and exercise. Patient agreed with treatment plan. Follow up as directed. MEDICATIONS:  Orders Placed This Encounter   Medications    methylPREDNISolone (MEDROL DOSEPACK) 4 MG tablet     Sig: Take by mouth. Dispense:  1 kit     Refill:  0    benzonatate (TESSALON) 200 MG capsule     Sig: Take 1 capsule by mouth 3 times daily as needed for Cough     Dispense:  30 capsule     Refill:  0         ORDERS:  Orders Placed This Encounter   Procedures    POCT Influenza A/B       Follow-up:  Return if symptoms worsen or fail to improve. PATIENT INSTRUCTIONS:  Patient Instructions     1. Health. If you have questions about a medical condition or this instruction, always ask your healthcare professional. [place] [id number] any warranty or liability for your use of this information.          Electronically signed by JALEEL White on 2/24/2018 at 1:38 PM

## 2018-04-11 ENCOUNTER — OFFICE VISIT (OUTPATIENT)
Dept: SURGERY | Age: 43
End: 2018-04-11
Payer: MEDICAID

## 2018-04-11 VITALS
TEMPERATURE: 97.8 F | HEIGHT: 64 IN | SYSTOLIC BLOOD PRESSURE: 130 MMHG | WEIGHT: 135 LBS | BODY MASS INDEX: 23.05 KG/M2 | DIASTOLIC BLOOD PRESSURE: 74 MMHG

## 2018-04-11 DIAGNOSIS — Z90.13 S/P BILATERAL MASTECTOMY: ICD-10-CM

## 2018-04-11 DIAGNOSIS — C50.812 MALIGNANT NEOPLASM OF OVERLAPPING SITES OF LEFT FEMALE BREAST, UNSPECIFIED ESTROGEN RECEPTOR STATUS (HCC): ICD-10-CM

## 2018-04-11 DIAGNOSIS — Z79.810 USE OF TAMOXIFEN (NOLVADEX): Primary | ICD-10-CM

## 2018-04-11 PROCEDURE — 99213 OFFICE O/P EST LOW 20 MIN: CPT | Performed by: SURGERY

## 2018-04-11 RX ORDER — AMOXICILLIN 500 MG/1
CAPSULE ORAL
COMMUNITY
Start: 2018-04-05 | End: 2019-05-30

## 2018-04-19 ENCOUNTER — TELEPHONE (OUTPATIENT)
Dept: SURGERY | Age: 43
End: 2018-04-19

## 2018-04-24 DIAGNOSIS — C50.812 MALIGNANT NEOPLASM OF OVERLAPPING SITES OF LEFT FEMALE BREAST, UNSPECIFIED ESTROGEN RECEPTOR STATUS (HCC): ICD-10-CM

## 2018-04-24 DIAGNOSIS — Z79.810 USE OF TAMOXIFEN (NOLVADEX): Primary | ICD-10-CM

## 2018-05-04 DIAGNOSIS — C50.919 MALIGNANT NEOPLASM OF FEMALE BREAST, UNSPECIFIED ESTROGEN RECEPTOR STATUS, UNSPECIFIED LATERALITY, UNSPECIFIED SITE OF BREAST (HCC): Primary | ICD-10-CM

## 2018-05-07 ENCOUNTER — LAB (OUTPATIENT)
Dept: LAB | Facility: HOSPITAL | Age: 43
End: 2018-05-07

## 2018-05-07 ENCOUNTER — OFFICE VISIT (OUTPATIENT)
Dept: ONCOLOGY | Facility: CLINIC | Age: 43
End: 2018-05-07

## 2018-05-07 VITALS
OXYGEN SATURATION: 96 % | HEART RATE: 93 BPM | WEIGHT: 134.2 LBS | DIASTOLIC BLOOD PRESSURE: 76 MMHG | RESPIRATION RATE: 16 BRPM | TEMPERATURE: 97.1 F | BODY MASS INDEX: 23.78 KG/M2 | SYSTOLIC BLOOD PRESSURE: 132 MMHG | HEIGHT: 63 IN

## 2018-05-07 DIAGNOSIS — C50.919 MALIGNANT NEOPLASM OF BREAST IN FEMALE, ESTROGEN RECEPTOR POSITIVE, UNSPECIFIED LATERALITY, UNSPECIFIED SITE OF BREAST (HCC): Primary | ICD-10-CM

## 2018-05-07 DIAGNOSIS — C50.919 MALIGNANT NEOPLASM OF FEMALE BREAST, UNSPECIFIED ESTROGEN RECEPTOR STATUS, UNSPECIFIED LATERALITY, UNSPECIFIED SITE OF BREAST (HCC): ICD-10-CM

## 2018-05-07 DIAGNOSIS — C50.919: Primary | ICD-10-CM

## 2018-05-07 DIAGNOSIS — Z17.0 MALIGNANT NEOPLASM OF BREAST IN FEMALE, ESTROGEN RECEPTOR POSITIVE, UNSPECIFIED LATERALITY, UNSPECIFIED SITE OF BREAST (HCC): Primary | ICD-10-CM

## 2018-05-07 LAB
ALBUMIN SERPL-MCNC: 4.5 G/DL (ref 3.5–5)
ALBUMIN/GLOB SERPL: 1.6 G/DL (ref 1.1–2.5)
ALP SERPL-CCNC: 59 U/L (ref 24–120)
ALT SERPL W P-5'-P-CCNC: 27 U/L (ref 0–54)
ANION GAP SERPL CALCULATED.3IONS-SCNC: 8 MMOL/L (ref 4–13)
AST SERPL-CCNC: 35 U/L (ref 7–45)
BASOPHILS # BLD AUTO: 0.02 10*3/MM3 (ref 0–0.2)
BASOPHILS NFR BLD AUTO: 0.4 % (ref 0–2)
BILIRUB SERPL-MCNC: 0.8 MG/DL (ref 0.1–1)
BUN BLD-MCNC: 9 MG/DL (ref 5–21)
BUN/CREAT SERPL: 12.5 (ref 7–25)
CALCIUM SPEC-SCNC: 9.6 MG/DL (ref 8.4–10.4)
CHLORIDE SERPL-SCNC: 101 MMOL/L (ref 98–110)
CO2 SERPL-SCNC: 33 MMOL/L (ref 24–31)
CREAT BLD-MCNC: 0.72 MG/DL (ref 0.5–1.4)
DEPRECATED RDW RBC AUTO: 38.7 FL (ref 40–54)
EOSINOPHIL # BLD AUTO: 0.11 10*3/MM3 (ref 0–0.7)
EOSINOPHIL NFR BLD AUTO: 2.1 % (ref 0–4)
ERYTHROCYTE [DISTWIDTH] IN BLOOD BY AUTOMATED COUNT: 12.6 % (ref 12–15)
GFR SERPL CREATININE-BSD FRML MDRD: 89 ML/MIN/1.73
GLOBULIN UR ELPH-MCNC: 2.9 GM/DL
GLUCOSE BLD-MCNC: 122 MG/DL (ref 70–100)
HCT VFR BLD AUTO: 36 % (ref 37–47)
HGB BLD-MCNC: 11.8 G/DL (ref 12–16)
HOLD SPECIMEN: NORMAL
HOLD SPECIMEN: NORMAL
IMM GRANULOCYTES # BLD: 0.02 10*3/MM3 (ref 0–0.03)
IMM GRANULOCYTES NFR BLD: 0.4 % (ref 0–5)
LYMPHOCYTES # BLD AUTO: 1.5 10*3/MM3 (ref 0.72–4.86)
LYMPHOCYTES NFR BLD AUTO: 28.2 % (ref 15–45)
MCH RBC QN AUTO: 27.6 PG (ref 28–32)
MCHC RBC AUTO-ENTMCNC: 32.8 G/DL (ref 33–36)
MCV RBC AUTO: 84.3 FL (ref 82–98)
MONOCYTES # BLD AUTO: 0.28 10*3/MM3 (ref 0.19–1.3)
MONOCYTES NFR BLD AUTO: 5.3 % (ref 4–12)
NEUTROPHILS # BLD AUTO: 3.39 10*3/MM3 (ref 1.87–8.4)
NEUTROPHILS NFR BLD AUTO: 63.6 % (ref 39–78)
NRBC BLD MANUAL-RTO: 0 /100 WBC (ref 0–0)
PLATELET # BLD AUTO: 233 10*3/MM3 (ref 130–400)
PMV BLD AUTO: 9.7 FL (ref 6–12)
POTASSIUM BLD-SCNC: 3.4 MMOL/L (ref 3.5–5.3)
PROT SERPL-MCNC: 7.4 G/DL (ref 6.3–8.7)
RBC # BLD AUTO: 4.27 10*6/MM3 (ref 4.2–5.4)
SODIUM BLD-SCNC: 142 MMOL/L (ref 135–145)
WBC NRBC COR # BLD: 5.32 10*3/MM3 (ref 4.8–10.8)

## 2018-05-07 PROCEDURE — 99213 OFFICE O/P EST LOW 20 MIN: CPT | Performed by: INTERNAL MEDICINE

## 2018-05-07 PROCEDURE — 80053 COMPREHEN METABOLIC PANEL: CPT | Performed by: INTERNAL MEDICINE

## 2018-05-07 PROCEDURE — 36415 COLL VENOUS BLD VENIPUNCTURE: CPT

## 2018-05-07 PROCEDURE — 85025 COMPLETE CBC W/AUTO DIFF WBC: CPT | Performed by: INTERNAL MEDICINE

## 2018-05-07 RX ORDER — ANASTROZOLE 1 MG/1
1 TABLET ORAL DAILY
Qty: 90 TABLET | Refills: 4 | Status: SHIPPED | OUTPATIENT
Start: 2018-05-07 | End: 2018-11-02

## 2018-05-07 NOTE — PROGRESS NOTES
[facility]  HEMATOLOGY & ONCOLOGY    Cancer Staging Information:  No matching staging information was found for the patient.      Subjective     VISIT DIAGNOSIS: No diagnosis found.    REASON FOR VISIT:     Chief Complaint   Patient presents with   • Follow-up     Breast Ca: She is here for f/u visit today        HEMATOLOGY / ONCOLOGY HISTORY:    No history exists.           INTERVAL HISTORY  Patient ID: Vanesa Manuel is a 43 y.o. year old female   Past Medical History:   Past Medical History:   Diagnosis Date   • Anemia    • Breast cancer    • Cancer     breast     Past Surgical History:   Past Surgical History:   Procedure Laterality Date   • BREAST BIOPSY Left    • BREAST SURGERY      biopsy   •  SECTION     •  SECTION     • HYSTERECTOMY     • OTHER SURGICAL HISTORY      took out expanders and placed implants   • WISDOM TOOTH EXTRACTION       Social History:   Social History     Social History   • Marital status:      Spouse name: N/A   • Number of children: N/A   • Years of education: N/A     Occupational History   • Not on file.     Social History Main Topics   • Smoking status: Former Smoker   • Smokeless tobacco: Not on file   • Alcohol use Yes      Comment: occasional   • Drug use: No   • Sexual activity: Not on file     Other Topics Concern   • Not on file     Social History Narrative   • No narrative on file     Family History:   Family History   Problem Relation Age of Onset   • Cancer Maternal Aunt      breast with mets   • Cancer Paternal Uncle      colon   • No Known Problems Mother    • No Known Problems Father    • No Known Problems Brother    • No Known Problems Daughter    • No Known Problems Daughter    • No Known Problems Daughter        Review of Systems   Constitutional: Negative.    HENT: Negative.    Eyes: Negative.    Respiratory: Negative.    Cardiovascular: Negative.    Gastrointestinal: Negative.    Endocrine: Negative.    Genitourinary: Negative.   "  Musculoskeletal: Negative.    Skin: Negative.    Neurological: Negative.    Hematological: Negative.    Psychiatric/Behavioral: Negative.         Performance Status:  Asymptomatic    Medications:    Current Outpatient Prescriptions   Medication Sig Dispense Refill   • anastrozole (ARIMIDEX) 1 MG tablet Take 1 tablet by mouth Daily. 30 tablet 5   • ferrous sulfate 325 (65 FE) MG tablet Take 1 tablet by mouth Daily With Breakfast. 30 tablet 2   • venlafaxine XR (EFFEXOR-XR) 75 MG 24 hr capsule        No current facility-administered medications for this visit.        ALLERGIES:    Allergies   Allergen Reactions   • Oxycodone-Acetaminophen Itching     \"felt weird\"   • Percocet [Oxycodone-Acetaminophen]        Objective      Vitals:    05/07/18 1414 05/07/18 1424   BP:  132/76   Pulse:  93   Resp:  16   Temp:  97.1 °F (36.2 °C)   TempSrc:  Tympanic   SpO2:  96%   Weight:  60.9 kg (134 lb 3.2 oz)   Height: 160 cm (63\") 160 cm (62.99\")         Current Status 5/7/2018   ECOG score 0         Physical Exam  General Appearance: Patient is awake, alert, oriented and in no acute distress. Patient is welldeveloped, wellnourished, and appears stated age.  HEENT: Normocephalic. Sclerae clear, conjunctiva pink, extraocular movements intact, pupils, round, reactive to light and  accommodation. Mouth and throat are clear with moist oral mucosa.  NECK: Supple, no jugular venous distention, thyroid not enlarged.  LYMPH: No cervical, supraclavicular, axillary, or inguinal lymphadenopathy.  CHEST: Equal bilateral expansion, AP  diameter normal, resonant percussion note  LUNGS: Good air movement, no rales, rhonchi, rubs or wheezes with auscultation  CARDIO: Regular sinus rhythm, no murmurs, gallops or rubs.  ABDOMEN: Nondistended, soft, No tenderness, no guarding, no rebound, No hepatosplenomegaly. No abdominal masses. Bowel sounds positive. No hernia  GENITALIA: Not examined.  BREASTS: Not examined.  MUSKEL: No joint swelling, " decreased motion, or inflammation  EXTREMS: No edema, clubbing, cyanosis, No varicose veins.  NEURO: Grossly nonfocal, Gait is coordinated and smooth, Cognition is preserved.  SKIN: No rashes, no ecchymoses, no petechia.  PSYCH: Oriented to time, place and person. Memory is preserved. Mood and affect appear normal  RECENT LABS:  Lab on 05/07/2018   Component Date Value Ref Range Status   • WBC 05/07/2018 5.32  4.80 - 10.80 10*3/mm3 Final   • RBC 05/07/2018 4.27  4.20 - 5.40 10*6/mm3 Final   • Hemoglobin 05/07/2018 11.8* 12.0 - 16.0 g/dL Final   • Hematocrit 05/07/2018 36.0* 37.0 - 47.0 % Final   • MCV 05/07/2018 84.3  82.0 - 98.0 fL Final   • MCH 05/07/2018 27.6* 28.0 - 32.0 pg Final   • MCHC 05/07/2018 32.8* 33.0 - 36.0 g/dL Final   • RDW 05/07/2018 12.6  12.0 - 15.0 % Final   • RDW-SD 05/07/2018 38.7* 40.0 - 54.0 fl Final   • MPV 05/07/2018 9.7  6.0 - 12.0 fL Final   • Platelets 05/07/2018 233  130 - 400 10*3/mm3 Final   • Neutrophil % 05/07/2018 63.6  39.0 - 78.0 % Final   • Lymphocyte % 05/07/2018 28.2  15.0 - 45.0 % Final   • Monocyte % 05/07/2018 5.3  4.0 - 12.0 % Final   • Eosinophil % 05/07/2018 2.1  0.0 - 4.0 % Final   • Basophil % 05/07/2018 0.4  0.0 - 2.0 % Final   • Immature Grans % 05/07/2018 0.4  0.0 - 5.0 % Final   • Neutrophils, Absolute 05/07/2018 3.39  1.87 - 8.40 10*3/mm3 Final   • Lymphocytes, Absolute 05/07/2018 1.50  0.72 - 4.86 10*3/mm3 Final   • Monocytes, Absolute 05/07/2018 0.28  0.19 - 1.30 10*3/mm3 Final   • Eosinophils, Absolute 05/07/2018 0.11  0.00 - 0.70 10*3/mm3 Final   • Basophils, Absolute 05/07/2018 0.02  0.00 - 0.20 10*3/mm3 Final   • Immature Grans, Absolute 05/07/2018 0.02  0.00 - 0.03 10*3/mm3 Final   • nRBC 05/07/2018 0.0  0.0 - 0.0 /100 WBC Final       RADIOLOGY:  No results found.         Assessment/Plan  Vanesa Manuel is a 43 y.o. year old female Ms. Manuel is a 40 year old female patient, stage IIA breast carcinoma 2014, for which she underwent neoadjuvant chemotherapy  with four cycles of Adriamycin, Cytoxan followed by 12 weekly courses of Taxol. She did have ER positive, MS positive, HER2/deepika negative disease and her BRCA testing was also negative. She underwent bilateral mastectomies, this was followed by reconstruction. S/p Tamoxifen, then SARAH and was switched to arimidex that is doing well.    Patient Active Problem List   Diagnosis   • Breast cancer greater than or equal to 2 cm in greatest dimension          1. Breast ca: caitie arimidex. Refilled today  2. Vasomotor symptoms: on effexor    3. NATALIO: tokk a break from taking the iron pill because she was sick. I advised to caitie since hemoglobin still low. We will check iron profile and ferritin next visit.        Los Krishnan MD    5/7/2018    2:32 PM

## 2018-08-20 ENCOUNTER — TELEPHONE (OUTPATIENT)
Dept: SURGERY | Age: 43
End: 2018-08-20

## 2018-08-20 NOTE — TELEPHONE ENCOUNTER
Called patient to inform her of upcoming appts. US 10/17/18 @ 3:30 PM then 10/24/18 @ 2:00 pm she is to see Dr. Leslie Stauffer in his office. No Answer, Left V/M explaining these appt.

## 2018-09-04 DIAGNOSIS — C50.919 MALIGNANT NEOPLASM OF FEMALE BREAST, UNSPECIFIED ESTROGEN RECEPTOR STATUS, UNSPECIFIED LATERALITY, UNSPECIFIED SITE OF BREAST (HCC): Primary | ICD-10-CM

## 2018-09-05 DIAGNOSIS — C50.919 MALIGNANT NEOPLASM OF FEMALE BREAST, UNSPECIFIED ESTROGEN RECEPTOR STATUS, UNSPECIFIED LATERALITY, UNSPECIFIED SITE OF BREAST (HCC): Primary | ICD-10-CM

## 2018-09-06 ENCOUNTER — APPOINTMENT (OUTPATIENT)
Dept: LAB | Facility: HOSPITAL | Age: 43
End: 2018-09-06

## 2018-09-10 ENCOUNTER — APPOINTMENT (OUTPATIENT)
Dept: LAB | Facility: HOSPITAL | Age: 43
End: 2018-09-10

## 2018-09-10 ENCOUNTER — OFFICE VISIT (OUTPATIENT)
Dept: ONCOLOGY | Facility: CLINIC | Age: 43
End: 2018-09-10

## 2018-09-10 VITALS
HEART RATE: 84 BPM | SYSTOLIC BLOOD PRESSURE: 146 MMHG | HEIGHT: 63 IN | DIASTOLIC BLOOD PRESSURE: 84 MMHG | WEIGHT: 137.3 LBS | RESPIRATION RATE: 16 BRPM | TEMPERATURE: 99 F | BODY MASS INDEX: 24.33 KG/M2 | OXYGEN SATURATION: 98 %

## 2018-09-10 DIAGNOSIS — C50.919: ICD-10-CM

## 2018-09-10 DIAGNOSIS — G43.809 OTHER MIGRAINE WITHOUT STATUS MIGRAINOSUS, NOT INTRACTABLE: ICD-10-CM

## 2018-09-10 DIAGNOSIS — D64.9 ANEMIA, UNSPECIFIED TYPE: Primary | ICD-10-CM

## 2018-09-10 LAB
ALBUMIN SERPL-MCNC: 4.7 G/DL (ref 3.5–5)
ALBUMIN/GLOB SERPL: 1.6 G/DL (ref 1.1–2.5)
ALP SERPL-CCNC: 59 U/L (ref 24–120)
ALT SERPL W P-5'-P-CCNC: 26 U/L (ref 0–54)
ANION GAP SERPL CALCULATED.3IONS-SCNC: 9 MMOL/L (ref 4–13)
AST SERPL-CCNC: 33 U/L (ref 7–45)
BASOPHILS # BLD AUTO: 0.02 10*3/MM3 (ref 0–0.2)
BASOPHILS NFR BLD AUTO: 0.3 % (ref 0–2)
BILIRUB SERPL-MCNC: 0.8 MG/DL (ref 0.1–1)
BUN BLD-MCNC: 12 MG/DL (ref 5–21)
BUN/CREAT SERPL: 14.5 (ref 7–25)
CALCIUM SPEC-SCNC: 9.4 MG/DL (ref 8.4–10.4)
CHLORIDE SERPL-SCNC: 102 MMOL/L (ref 98–110)
CO2 SERPL-SCNC: 32 MMOL/L (ref 24–31)
CREAT BLD-MCNC: 0.83 MG/DL (ref 0.5–1.4)
DEPRECATED RDW RBC AUTO: 38.1 FL (ref 40–54)
EOSINOPHIL # BLD AUTO: 0.06 10*3/MM3 (ref 0–0.7)
EOSINOPHIL NFR BLD AUTO: 0.9 % (ref 0–4)
ERYTHROCYTE [DISTWIDTH] IN BLOOD BY AUTOMATED COUNT: 12.1 % (ref 12–15)
GFR SERPL CREATININE-BSD FRML MDRD: 76 ML/MIN/1.73
GLOBULIN UR ELPH-MCNC: 3 GM/DL
GLUCOSE BLD-MCNC: 99 MG/DL (ref 70–100)
HCT VFR BLD AUTO: 37 % (ref 37–47)
HGB BLD-MCNC: 12.4 G/DL (ref 12–16)
IMM GRANULOCYTES # BLD: 0.02 10*3/MM3 (ref 0–0.03)
IMM GRANULOCYTES NFR BLD: 0.3 % (ref 0–5)
LYMPHOCYTES # BLD AUTO: 1.51 10*3/MM3 (ref 0.72–4.86)
LYMPHOCYTES NFR BLD AUTO: 21.6 % (ref 15–45)
MCH RBC QN AUTO: 28.9 PG (ref 28–32)
MCHC RBC AUTO-ENTMCNC: 33.5 G/DL (ref 33–36)
MCV RBC AUTO: 86.2 FL (ref 82–98)
MONOCYTES # BLD AUTO: 0.58 10*3/MM3 (ref 0.19–1.3)
MONOCYTES NFR BLD AUTO: 8.3 % (ref 4–12)
NEUTROPHILS # BLD AUTO: 4.79 10*3/MM3 (ref 1.87–8.4)
NEUTROPHILS NFR BLD AUTO: 68.6 % (ref 39–78)
NRBC BLD MANUAL-RTO: 0 /100 WBC (ref 0–0)
PLATELET # BLD AUTO: 227 10*3/MM3 (ref 130–400)
PMV BLD AUTO: 10.1 FL (ref 6–12)
POTASSIUM BLD-SCNC: 4 MMOL/L (ref 3.5–5.3)
PROT SERPL-MCNC: 7.7 G/DL (ref 6.3–8.7)
RBC # BLD AUTO: 4.29 10*6/MM3 (ref 4.2–5.4)
SODIUM BLD-SCNC: 143 MMOL/L (ref 135–145)
WBC NRBC COR # BLD: 6.98 10*3/MM3 (ref 4.8–10.8)

## 2018-09-10 PROCEDURE — 80053 COMPREHEN METABOLIC PANEL: CPT | Performed by: INTERNAL MEDICINE

## 2018-09-10 PROCEDURE — 36415 COLL VENOUS BLD VENIPUNCTURE: CPT | Performed by: INTERNAL MEDICINE

## 2018-09-10 PROCEDURE — 85025 COMPLETE CBC W/AUTO DIFF WBC: CPT | Performed by: INTERNAL MEDICINE

## 2018-09-10 PROCEDURE — 99214 OFFICE O/P EST MOD 30 MIN: CPT | Performed by: INTERNAL MEDICINE

## 2018-09-10 NOTE — PROGRESS NOTES
[facility]  HEMATOLOGY & ONCOLOGY    Cancer Staging Information:  No matching staging information was found for the patient.      Subjective     VISIT DIAGNOSIS:   Encounter Diagnoses   Name Primary?   • Anemia, unspecified type Yes   • Malignant neoplasm of breast greater than or equal to 2 cm in greatest dimension, unspecified laterality (CMS/HCC)        REASON FOR VISIT:     Chief Complaint   Patient presents with   • Breast Cancer        HEMATOLOGY / ONCOLOGY HISTORY:    No history exists.           INTERVAL HISTORY  Patient ID: Vanesa Manuel is a 43 y.o. year old female with Hr+ breast ca here for f/u. Has malick mastectomy d/4 mammo not indicated. On arimidex s/p SARAH+BSO. Her only complain was a migraine HA sometime ago which she described as the worst HA of her life. However she did not sick medical therapy. She took otc migraine tabs and ws better. She has occasional migraines that is easily aborted with OTC. No neurology weakness though.  Past Medical History:   Past Medical History:   Diagnosis Date   • Anemia    • Breast cancer (CMS/HCC)    • Cancer (CMS/HCC)     breast     Past Surgical History:   Past Surgical History:   Procedure Laterality Date   • BREAST BIOPSY Left    • BREAST SURGERY      biopsy   •  SECTION     •  SECTION     • HYSTERECTOMY     • OTHER SURGICAL HISTORY      took out expanders and placed implants   • WISDOM TOOTH EXTRACTION       Social History:   Social History     Social History   • Marital status:      Spouse name: N/A   • Number of children: N/A   • Years of education: N/A     Occupational History   • Not on file.     Social History Main Topics   • Smoking status: Former Smoker   • Smokeless tobacco: Not on file   • Alcohol use Yes      Comment: occasional   • Drug use: No   • Sexual activity: Not on file     Other Topics Concern   • Not on file     Social History Narrative   • No narrative on file     Family History:   Family History  "  Problem Relation Age of Onset   • Cancer Maternal Aunt         breast with mets   • Cancer Paternal Uncle         colon   • No Known Problems Mother    • No Known Problems Father    • No Known Problems Brother    • No Known Problems Daughter    • No Known Problems Daughter    • No Known Problems Daughter        Review of Systems   Constitutional: Negative.    Eyes: Negative.    Respiratory: Negative.    Cardiovascular: Negative.    Gastrointestinal: Negative.    Endocrine: Negative.    Genitourinary: Negative.    Musculoskeletal: Negative.    Skin: Negative.    Neurological: Positive for headaches.   Hematological: Negative.    Psychiatric/Behavioral: Negative.         Performance Status:  Asymptomatic    Medications:    Current Outpatient Prescriptions   Medication Sig Dispense Refill   • anastrozole (ARIMIDEX) 1 MG tablet Take 1 tablet by mouth Daily. 90 tablet 4   • ferrous sulfate 325 (65 FE) MG tablet Take 1 tablet by mouth Daily With Breakfast. 30 tablet 2   • venlafaxine XR (EFFEXOR-XR) 75 MG 24 hr capsule        No current facility-administered medications for this visit.        ALLERGIES:    Allergies   Allergen Reactions   • Oxycodone-Acetaminophen Itching     \"felt weird\"   • Percocet [Oxycodone-Acetaminophen]        Objective      Vitals:    09/10/18 1511   BP: 146/84   Pulse: 84   Resp: 16   Temp: 99 °F (37.2 °C)   TempSrc: Oral   SpO2: 98%   Weight: 62.3 kg (137 lb 4.8 oz)   Height: 160 cm (62.99\")         Current Status 9/10/2018   ECOG score 0         Physical Exam    General Appearance: Patient is awake, alert, oriented and in no acute distress. Patient is welldeveloped, wellnourished, and appears stated age.  HEENT: Normocephalic. Sclerae clear, conjunctiva pink, extraocular movements intact, pupils, round, reactive to light and  accommodation. Mouth and throat are clear with moist oral mucosa.  NECK: Supple, no jugular venous distention, thyroid not enlarged.  LYMPH: No cervical, " supraclavicular, axillary, or inguinal lymphadenopathy.  CHEST: Equal bilateral expansion, AP  diameter normal, resonant percussion note  LUNGS: Good air movement, no rales, rhonchi, rubs or wheezes with auscultation  CARDIO: Regular sinus rhythm, no murmurs, gallops or rubs.  ABDOMEN: Nondistended, soft, No tenderness, no guarding, no rebound, No hepatosplenomegaly. No abdominal masses. Bowel sounds positive. No hernia  GENITALIA: Not examined.  BREASTS: Not examined.pt deferred  MUSKEL: No joint swelling, decreased motion, or inflammation  EXTREMS: No edema, clubbing, cyanosis, No varicose veins.  NEURO: Grossly nonfocal, Gait is coordinated and smooth, Cognition is preserved.  SKIN: No rashes, no ecchymoses, no petechia.  PSYCH: Oriented to time, place and person. Memory is preserved. Mood and affect appear normal  RECENT LABS:  No visits with results within 7 Day(s) from this visit.   Latest known visit with results is:   Lab on 05/07/2018   Component Date Value Ref Range Status   • Glucose 05/07/2018 122* 70 - 100 mg/dL Final   • BUN 05/07/2018 9  5 - 21 mg/dL Final   • Creatinine 05/07/2018 0.72  0.50 - 1.40 mg/dL Final   • Sodium 05/07/2018 142  135 - 145 mmol/L Final   • Potassium 05/07/2018 3.4* 3.5 - 5.3 mmol/L Final   • Chloride 05/07/2018 101  98 - 110 mmol/L Final   • CO2 05/07/2018 33.0* 24.0 - 31.0 mmol/L Final   • Calcium 05/07/2018 9.6  8.4 - 10.4 mg/dL Final   • Total Protein 05/07/2018 7.4  6.3 - 8.7 g/dL Final   • Albumin 05/07/2018 4.50  3.50 - 5.00 g/dL Final   • ALT (SGPT) 05/07/2018 27  0 - 54 U/L Final   • AST (SGOT) 05/07/2018 35  7 - 45 U/L Final   • Alkaline Phosphatase 05/07/2018 59  24 - 120 U/L Final   • Total Bilirubin 05/07/2018 0.8  0.1 - 1.0 mg/dL Final   • eGFR Non African Amer 05/07/2018 89  >60 mL/min/1.73 Final   • Globulin 05/07/2018 2.9  gm/dL Final   • A/G Ratio 05/07/2018 1.6  1.1 - 2.5 g/dL Final   • BUN/Creatinine Ratio 05/07/2018 12.5  7.0 - 25.0 Final   • Anion Gap  05/07/2018 8.0  4.0 - 13.0 mmol/L Final   • WBC 05/07/2018 5.32  4.80 - 10.80 10*3/mm3 Final   • RBC 05/07/2018 4.27  4.20 - 5.40 10*6/mm3 Final   • Hemoglobin 05/07/2018 11.8* 12.0 - 16.0 g/dL Final   • Hematocrit 05/07/2018 36.0* 37.0 - 47.0 % Final   • MCV 05/07/2018 84.3  82.0 - 98.0 fL Final   • MCH 05/07/2018 27.6* 28.0 - 32.0 pg Final   • MCHC 05/07/2018 32.8* 33.0 - 36.0 g/dL Final   • RDW 05/07/2018 12.6  12.0 - 15.0 % Final   • RDW-SD 05/07/2018 38.7* 40.0 - 54.0 fl Final   • MPV 05/07/2018 9.7  6.0 - 12.0 fL Final   • Platelets 05/07/2018 233  130 - 400 10*3/mm3 Final   • Neutrophil % 05/07/2018 63.6  39.0 - 78.0 % Final   • Lymphocyte % 05/07/2018 28.2  15.0 - 45.0 % Final   • Monocyte % 05/07/2018 5.3  4.0 - 12.0 % Final   • Eosinophil % 05/07/2018 2.1  0.0 - 4.0 % Final   • Basophil % 05/07/2018 0.4  0.0 - 2.0 % Final   • Immature Grans % 05/07/2018 0.4  0.0 - 5.0 % Final   • Neutrophils, Absolute 05/07/2018 3.39  1.87 - 8.40 10*3/mm3 Final   • Lymphocytes, Absolute 05/07/2018 1.50  0.72 - 4.86 10*3/mm3 Final   • Monocytes, Absolute 05/07/2018 0.28  0.19 - 1.30 10*3/mm3 Final   • Eosinophils, Absolute 05/07/2018 0.11  0.00 - 0.70 10*3/mm3 Final   • Basophils, Absolute 05/07/2018 0.02  0.00 - 0.20 10*3/mm3 Final   • Immature Grans, Absolute 05/07/2018 0.02  0.00 - 0.03 10*3/mm3 Final   • nRBC 05/07/2018 0.0  0.0 - 0.0 /100 WBC Final   • Extra Tube 05/07/2018 Hold for add-ons.   Final    Auto resulted.   • Extra Tube 05/07/2018 Hold for add-ons.   Final    Auto resulted.       RADIOLOGY:  No results found.         Assessment/Plan  Vanesa Manuel is a 43 y.o. year old female patient, stage IIA breast carcinoma 2014, for which she underwent neoadjuvant chemotherapy with four cycles of Adriamycin, Cytoxan followed by 12 weekly courses of Taxol. She did have ER positive, CT positive, HER2/deepika negative disease and her BRCA testing was also negative. She underwent bilateral mastectomies, this was followed by  reconstruction. S/p Tamoxifen, then SARAH and was switched to arimidex that is doing well.    Patient Active Problem List   Diagnosis   • Breast cancer greater than or equal to 2 cm in greatest dimension (CMS/HCC)          1. Breast ca: caitie arimidex. Refilled today  2. Vasomotor symptoms: on effexor    3. NATALIO: toke a break from taking the iron pill because she was sick. I advised to caitie since hemoglobin still low. We will check iron profile and ferritin next visit.  4. Migraine: Currently occassionally. She self medicates with otc migraine tab and drinking coffee. Advised patient that she needs to see neurologist to r/u structural issues leading to migraine. Given non worsening of migraine with continued arimidex use and response to otc, I do not think it is from arimidex. However, I advised her that she could try being off arimdex for a month and see if the migraine subsides.  --on second thought, will get MRI brain to r/o brain mets.      Los Krishnan MD    9/10/2018    3:26 PM

## 2018-09-18 ENCOUNTER — APPOINTMENT (OUTPATIENT)
Dept: MRI IMAGING | Facility: HOSPITAL | Age: 43
End: 2018-09-18
Attending: INTERNAL MEDICINE

## 2018-09-26 PROBLEM — Z01.419 ENCOUNTER FOR GYNECOLOGICAL EXAMINATION WITHOUT ABNORMAL FINDING: Status: RESOLVED | Noted: 2017-03-09 | Resolved: 2018-09-26

## 2018-10-01 ENCOUNTER — HOSPITAL ENCOUNTER (OUTPATIENT)
Dept: MRI IMAGING | Facility: HOSPITAL | Age: 43
Discharge: HOME | End: 2018-10-01
Attending: INTERNAL MEDICINE | Admitting: INTERNAL MEDICINE

## 2018-10-01 DIAGNOSIS — G43.809 OTHER MIGRAINE WITHOUT STATUS MIGRAINOSUS, NOT INTRACTABLE: ICD-10-CM

## 2018-10-01 LAB — CREAT BLDA-MCNC: 0.7 MG/DL (ref 0.6–1.3)

## 2018-10-01 PROCEDURE — 70553 MRI BRAIN STEM W/O & W/DYE: CPT

## 2018-10-01 PROCEDURE — 0 GADOBENATE DIMEGLUMINE 529 MG/ML SOLUTION: Performed by: INTERNAL MEDICINE

## 2018-10-01 PROCEDURE — A9577 INJ MULTIHANCE: HCPCS | Performed by: INTERNAL MEDICINE

## 2018-10-01 PROCEDURE — 82565 ASSAY OF CREATININE: CPT

## 2018-10-01 RX ADMIN — GADOBENATE DIMEGLUMINE 12 ML: 529 INJECTION, SOLUTION INTRAVENOUS at 11:09

## 2018-10-17 ENCOUNTER — HOSPITAL ENCOUNTER (OUTPATIENT)
Dept: MRI IMAGING | Age: 43
Discharge: HOME | End: 2018-10-17
Payer: MEDICAID

## 2018-10-17 DIAGNOSIS — Z79.810 USE OF TAMOXIFEN (NOLVADEX): ICD-10-CM

## 2018-10-17 DIAGNOSIS — C50.812 MALIGNANT NEOPLASM OF OVERLAPPING SITES OF LEFT FEMALE BREAST, UNSPECIFIED ESTROGEN RECEPTOR STATUS (HCC): ICD-10-CM

## 2018-10-17 PROCEDURE — C8908 MRI W/O FOL W/CONT, BREAST,: HCPCS

## 2018-10-17 PROCEDURE — A9577 INJ MULTIHANCE: HCPCS | Performed by: SURGERY

## 2018-10-17 PROCEDURE — 6360000004 HC RX CONTRAST MEDICATION: Performed by: SURGERY

## 2018-10-17 RX ADMIN — GADOBENATE DIMEGLUMINE 13 ML: 529 INJECTION, SOLUTION INTRAVENOUS at 17:46

## 2018-10-24 ENCOUNTER — OFFICE VISIT (OUTPATIENT)
Dept: SURGERY | Age: 43
End: 2018-10-24
Payer: MEDICAID

## 2018-10-24 VITALS — DIASTOLIC BLOOD PRESSURE: 84 MMHG | HEART RATE: 80 BPM | SYSTOLIC BLOOD PRESSURE: 122 MMHG

## 2018-10-24 DIAGNOSIS — Z90.13 S/P BILATERAL MASTECTOMY: Primary | ICD-10-CM

## 2018-10-24 DIAGNOSIS — C50.812 MALIGNANT NEOPLASM OF OVERLAPPING SITES OF LEFT FEMALE BREAST, UNSPECIFIED ESTROGEN RECEPTOR STATUS (HCC): ICD-10-CM

## 2018-10-24 PROCEDURE — 99213 OFFICE O/P EST LOW 20 MIN: CPT | Performed by: SURGERY

## 2018-10-26 RX ORDER — RALOXIFENE HYDROCHLORIDE 60 MG/1
60 TABLET, FILM COATED ORAL DAILY
COMMUNITY
End: 2019-05-30

## 2018-11-02 ENCOUNTER — OFFICE VISIT (OUTPATIENT)
Dept: RETAIL CLINIC | Facility: CLINIC | Age: 43
End: 2018-11-02

## 2018-11-02 VITALS
BODY MASS INDEX: 22.2 KG/M2 | RESPIRATION RATE: 18 BRPM | DIASTOLIC BLOOD PRESSURE: 90 MMHG | TEMPERATURE: 98.3 F | OXYGEN SATURATION: 98 % | HEART RATE: 78 BPM | SYSTOLIC BLOOD PRESSURE: 120 MMHG | HEIGHT: 64 IN | WEIGHT: 130 LBS

## 2018-11-02 DIAGNOSIS — R03.0 ELEVATED BLOOD PRESSURE READING: ICD-10-CM

## 2018-11-02 DIAGNOSIS — J06.9 URI, ACUTE: Primary | ICD-10-CM

## 2018-11-02 PROCEDURE — 99213 OFFICE O/P EST LOW 20 MIN: CPT | Performed by: NURSE PRACTITIONER

## 2018-11-02 RX ORDER — AMOXICILLIN 875 MG/1
875 TABLET, COATED ORAL 2 TIMES DAILY
Qty: 20 TABLET | Refills: 0 | Status: SHIPPED | OUTPATIENT
Start: 2018-11-02 | End: 2018-12-19

## 2018-11-02 NOTE — PATIENT INSTRUCTIONS
"Upper Respiratory Infection, Adult  Most upper respiratory infections (URIs) are caused by a virus. A URI affects the nose, throat, and upper air passages. The most common type of URI is often called \"the common cold.\"  Follow these instructions at home:  · Take medicines only as told by your doctor.  · Gargle warm saltwater or take cough drops to comfort your throat as told by your doctor.  · Use a warm mist humidifier or inhale steam from a shower to increase air moisture. This may make it easier to breathe.  · Drink enough fluid to keep your pee (urine) clear or pale yellow.  · Eat soups and other clear broths.  · Have a healthy diet.  · Rest as needed.  · Go back to work when your fever is gone or your doctor says it is okay.  ? You may need to stay home longer to avoid giving your URI to others.  ? You can also wear a face mask and wash your hands often to prevent spread of the virus.  · Use your inhaler more if you have asthma.  · Do not use any tobacco products, including cigarettes, chewing tobacco, or electronic cigarettes. If you need help quitting, ask your doctor.  Contact a doctor if:  · You are getting worse, not better.  · Your symptoms are not helped by medicine.  · You have chills.  · You are getting more short of breath.  · You have brown or red mucus.  · You have yellow or brown discharge from your nose.  · You have pain in your face, especially when you bend forward.  · You have a fever.  · You have puffy (swollen) neck glands.  · You have pain while swallowing.  · You have white areas in the back of your throat.  Get help right away if:  · You have very bad or constant:  ? Headache.  ? Ear pain.  ? Pain in your forehead, behind your eyes, and over your cheekbones (sinus pain).  ? Chest pain.  · You have long-lasting (chronic) lung disease and any of the following:  ? Wheezing.  ? Long-lasting cough.  ? Coughing up blood.  ? A change in your usual mucus.  · You have a stiff neck.  · You have " changes in your:  ? Vision.  ? Hearing.  ? Thinking.  ? Mood.  This information is not intended to replace advice given to you by your health care provider. Make sure you discuss any questions you have with your health care provider.  Document Released: 06/05/2009 Document Revised: 08/20/2017 Document Reviewed: 03/25/2015  [employer] Patient Education © 2018 [employer]    Hold Antibiotic. Symptoms are viral and will likely resolve on their own  You may take over the counter pain relievers as needed  Use saline spray liberally to both nostrils  See primary care provider if not getting better with treatment, symptoms are worsening or you are developing new symptoms    Blood Pressure Record Sheet  Your blood pressure on this visit to the emergency department or clinic is elevated. This does not necessarily mean you have high blood pressure (hypertension), but it does mean that your blood pressure needs to be rechecked. Many times your blood pressure can increase due to illness, pain, anxiety, or other factors.  We recommend that you get a series of blood pressure readings done over a period of 5 days. It is best to get a reading in the morning and one in the evening. You should make sure to sit and relax for 1-5 minutes before the reading is taken. Write the readings down and make a follow-up appointment with your health care provider to discuss the results. If there is not a free clinic or a drug store with a blood-pressure-taking machine near you, you can purchase blood-pressure-taking equipment from a drug store. Having one in the home allows you the convenience of taking your blood pressure while you are home and relaxed.  Blood Pressure Log  Date: _______________________  · a.m. _____________________  · p.m. _____________________    Date: _______________________  · a.m. _____________________  · p.m. _____________________    Date: _______________________  · a.m. _____________________  · p.m.  _____________________    Date: _______________________  · a.m. _____________________  · p.m. _____________________    Date: _______________________  · a.m. _____________________  · p.m. _____________________    This information is not intended to replace advice given to you by your health care provider. Make sure you discuss any questions you have with your health care provider.  Document Released: 09/15/2004 Document Revised: 12/01/2017 Document Reviewed: 02/10/2015  [employer] Interactive Patient Education © 2018 [employer]

## 2018-11-17 NOTE — PROGRESS NOTES
Subjective   URI    Vanesa Manuel is a 43 y.o. female who presents with upper respiratory symptoms..     URI    This is a new problem. The current episode started yesterday. The problem has been unchanged. Maximum temperature: unknown. Associated symptoms include congestion, coughing, headaches, a plugged ear sensation, rhinorrhea, sneezing and a sore throat. Pertinent negatives include no diarrhea, ear pain, nausea, neck pain, rash, vomiting or wheezing. She has tried nothing for the symptoms.        History Obtained from: Patient    Past Medical History:   Diagnosis Date   • Anemia    • Breast cancer (CMS/HCC)    • Cancer (CMS/HCC)     breast     Past Surgical History:   Procedure Laterality Date   • BREAST BIOPSY Left    • BREAST SURGERY      biopsy   •  SECTION     •  SECTION     • HYSTERECTOMY     • OTHER SURGICAL HISTORY      took out expanders and placed implants   • WISDOM TOOTH EXTRACTION       Social History     Socioeconomic History   • Marital status:      Spouse name: Not on file   • Number of children: Not on file   • Years of education: Not on file   • Highest education level: Not on file   Social Needs   • Financial resource strain: Not on file   • Food insecurity - worry: Not on file   • Food insecurity - inability: Not on file   • Transportation needs - medical: Not on file   • Transportation needs - non-medical: Not on file   Occupational History   • Not on file   Tobacco Use   • Smoking status: Former Smoker   Substance and Sexual Activity   • Alcohol use: Yes     Comment: occasional   • Drug use: No   • Sexual activity: Not on file   Other Topics Concern   • Not on file   Social History Narrative   • Not on file     Family History   Problem Relation Age of Onset   • Cancer Maternal Aunt         breast with mets   • Cancer Paternal Uncle         colon   • No Known Problems Mother    • No Known Problems Father    • No Known Problems Brother    • No Known Problems Daughter   "  • No Known Problems Daughter    • No Known Problems Daughter      Allergies   Allergen Reactions   • Oxycodone-Acetaminophen Itching     \"felt weird\"   • Percocet [Oxycodone-Acetaminophen]      Current Outpatient Medications   Medication Sig Dispense Refill   • venlafaxine XR (EFFEXOR-XR) 75 MG 24 hr capsule      • amoxicillin (AMOXIL) 875 MG tablet Take 1 tablet by mouth 2 (Two) Times a Day. 20 tablet 0   • ferrous sulfate 325 (65 FE) MG tablet Take 1 tablet by mouth Daily With Breakfast. 30 tablet 2     No current facility-administered medications for this visit.         The following portions of the patient's history were reviewed and updated as appropriate: allergies, current medications, past family history, past medical history, past social history and past surgical history.    Review of Systems   Constitutional: Positive for fatigue. Negative for chills and fever.   HENT: Positive for congestion, postnasal drip, rhinorrhea, sinus pressure, sneezing and sore throat. Negative for ear discharge, ear pain and trouble swallowing.    Eyes: Negative.    Respiratory: Positive for cough. Negative for chest tightness, wheezing and stridor.    Cardiovascular: Negative.    Gastrointestinal: Negative.  Negative for diarrhea, nausea and vomiting.   Musculoskeletal: Positive for myalgias. Negative for neck pain and neck stiffness.   Skin: Negative for rash.   Allergic/Immunologic: Negative for immunocompromised state.   Neurological: Positive for headaches. Negative for dizziness and light-headedness.   Hematological: Negative for adenopathy. Does not bruise/bleed easily.   Psychiatric/Behavioral: Negative.        Objective     VITAL SIGNS:   Vitals:    11/02/18 1720   BP: 120/90   BP Location: Right arm   Pulse: 78   Resp: 18   Temp: 98.3 °F (36.8 °C)   SpO2: 98%   Weight: 59 kg (130 lb)   Height: 162.6 cm (64\")   Body mass index is 22.31 kg/m².    Physical Exam   Constitutional: She is cooperative.  Non-toxic " appearance. She does not appear ill.   HENT:   Head: Normocephalic and atraumatic.   Right Ear: Tympanic membrane, external ear and ear canal normal.   Left Ear: Tympanic membrane, external ear and ear canal normal.   Nose: Rhinorrhea present. No mucosal edema. Right sinus exhibits no maxillary sinus tenderness and no frontal sinus tenderness. Left sinus exhibits no maxillary sinus tenderness and no frontal sinus tenderness.   Mouth/Throat: Uvula is midline, oropharynx is clear and moist and mucous membranes are normal. Normal dentition.   Eyes: Conjunctivae, EOM and lids are normal. Pupils are equal, round, and reactive to light. No scleral icterus.   Neck: Normal range of motion and phonation normal. Neck supple. No tracheal deviation present.   Cardiovascular: Normal rate and regular rhythm. Exam reveals no friction rub.   No murmur heard.  Pulmonary/Chest: Effort normal and breath sounds normal.   Lymphadenopathy:     She has no cervical adenopathy.        Right: No supraclavicular adenopathy present.        Left: No supraclavicular adenopathy present.   Neurological: She is alert. Coordination and gait normal.   Skin: Skin is warm and dry. Capillary refill takes less than 2 seconds. No cyanosis. No pallor. Nails show no clubbing.   Psychiatric: Her behavior is normal. She is attentive.       CLINICAL QUALITY MEASURES:  Tobacco Screening & Intervention Screened & identified as tobacco non-user. Never smoker   WEIGHT SCREENING/BMI  Calculated BMI within normal parameteres & documented     Assessment/Plan     Vanesa was seen today for uri.    Diagnoses and all orders for this visit:    URI, acute    Elevated blood pressure reading    Other orders  -   Watch and Wait:  amoxicillin (AMOXIL) 875 MG tablet; Take 1 tablet by mouth 2 (Two) Times a Day.        PLAN:  Patient should follow up with primary care provider and other healthcare providers. See PCP if symptoms fail to improve with treatment, worsen or for the  development of new symptoms that need attention.    The patient voiced understanding and agreement to the patient treatment plan and instructions       BENNY Santacruz

## 2018-12-25 ENCOUNTER — NURSE TRIAGE (OUTPATIENT)
Dept: CALL CENTER | Facility: HOSPITAL | Age: 43
End: 2018-12-25

## 2018-12-26 NOTE — TELEPHONE ENCOUNTER
Reviewed guideline with caller, caller states her mother has /91 is not being treated for high blood pressure, is having a bad headache and has been acting confused/disoriented this evening. Advised caller to take her mother to ED for evaluation of her symptoms.     Reason for Disposition  • [1] SEVERE headache AND [2] sudden-onset (i.e., reaching maximum intensity within seconds)    Additional Information  • Negative: Difficult to awaken or acting confused (e.g., disoriented, slurred speech)  • Negative: Severe difficulty breathing (e.g., struggling for each breath, speaks in single words)  • Negative: [1] Weakness of the face, arm or leg on one side of the body AND [2] new onset  • Negative: [1] Numbness (i.e., loss of sensation) of the face, arm or leg on one side of the body AND [2] new onset  • Negative: [1] Chest pain lasts > 5 minutes AND [2] history of heart disease  (i.e., heart attack, bypass surgery, angina, angioplasty, CHF)  • Negative: [1] Chest pain AND [2] took nitrogylcerin AND [3] pain was not relieved  • Negative: Sounds like a life-threatening emergency to the triager  • Negative: Low blood pressure is main concern  • Negative: [1] Systolic BP  >= 160 OR Diastolic >= 100 AND [2] cardiac or neurologic symptoms (e.g., chest pain, difficulty breathing, unsteady gait, blurred vision)  • Negative: [1] Pregnant > 20 weeks AND [2] new hand or face swelling  • Negative: [1] Pregnant > 20 weeks AND [2] BP Systolic BP  >= 140 OR Diastolic >= 90  • Negative: [1] Systolic BP  >= 200 OR Diastolic >= 120  AND [2] having NO cardiac or neurologic symptoms  • Negative: [1] Systolic BP  >= 180 OR Diastolic >= 110 AND [2] missed most recent dose of blood pressure medication  • Symptom is main concern  (e.g., headache, chest pain)  • Negative: Difficult to awaken or acting confused (e.g., disoriented, slurred speech)  • Negative: [1] Weakness of the face, arm or leg on one side of the body AND [2] new onset  •  "Negative: [1] Numbness of the face, arm or leg on one side of the body AND [2] new onset  • Negative: [1] Loss of speech or garbled speech AND [2] new onset  • Negative: Passed out (i.e., lost consciousness, collapsed and was not responding)  • Negative: Sounds like a life-threatening emergency to the triager  • Negative: Followed a head injury within last 3 days  • Negative: Pregnant  • Negative: Traumatic Brain Injury (TBI) is suspected  • Negative: Unable to walk, or can only walk with assistance (e.g., requires support)  • Negative: Stiff neck (can't touch chin to chest)  • Negative: Severe pain in one eye  • Negative: [1] Other family members (or roommates) with headaches AND [2] possibility of carbon monoxide exposure  • Negative: [1] SEVERE headache (e.g., excruciating) AND [2] \"worst headache\" of life  • Negative: [1] SEVERE headache AND [2] fever  • Negative: Loss of vision or double vision (Exception: same as prior migraines)  • Negative: [1] Fever > 100.0 F (37.8 C) AND [2] diabetes mellitus or weak immune system (e.g., HIV positive, cancer chemo, splenectomy, chronic steroids)  • Negative: Patient sounds very sick or weak to the triager  • Negative: [1] SEVERE headache (e.g., excruciating) AND [2] not improved after 2 hours of pain medicine  • Negative: [1] Vomiting AND [2] 2 or more times (Exception: similar to previous migraines)  • Negative: Fever > 104 F (40 C)  • Negative: [1] MODERATE headache (e.g., interferes with normal activities) AND [2] present > 24 hours AND [3] unexplained  (Exceptions: analgesics not tried, typical migraine, or headache part of viral illness)  • Negative: [1] New headache AND [2] weak immune system (e.g., HIV positive, cancer chemo, splenectomy, organ transplant, chronic steroids)  • Negative: [1] New headache AND [2] age > 50  • Negative: [1] Sinus pain of forehead AND [2] yellow or green nasal discharge  • Negative: Fever present > 3 days (72 hours)    Answer Assessment " "- Initial Assessment Questions  1. BLOOD PRESSURE: \"What is the blood pressure?\" \"Did you take at least two measurements 5 minutes apart?\"      145/91 taken twice  2. ONSET: \"When did you take your blood pressure?\"      30 minutes ago  3. HOW: \"How did you obtain the blood pressure?\" (e.g., visiting nurse, automatic home BP monitor)      Automatic home BP monitor  4. HISTORY: \"Do you have a history of high blood pressure?\"      yes  5. MEDICATIONS: \"Are you taking any medications for blood pressure?\" \"Have you missed any doses recently?\"      No medications  6. OTHER SYMPTOMS: \"Do you have any symptoms?\" (e.g., headache, chest pain, blurred vision, difficulty breathing, weakness)      Headache, nausea, disoriented  7. PREGNANCY: \"Is there any chance you are pregnant?\" \"When was your last menstrual period?\"      no    Answer Assessment - Initial Assessment Questions  1. LOCATION: \"Where does it hurt?\"       frontal  2. ONSET: \"When did the headache start?\" (Minutes, hours or days)       1 hour ago  3. PATTERN: \"Does the pain come and go, or has it been constant since it started?\"      constant  4. SEVERITY: \"How bad is the pain?\" and \"What does it keep you from doing?\"  (e.g., Scale 1-10; mild, moderate, or severe)    - MILD (1-3): doesn't interfere with normal activities     - MODERATE (4-7): interferes with normal activities or awakens from sleep     - SEVERE (8-10): excruciating pain, unable to do any normal activities         7/10  5. RECURRENT SYMPTOM: \"Have you ever had headaches before?\" If so, ask: \"When was the last time?\" and \"What happened that time?\"       Yes, 1 week ago, took some Excedrin  6. CAUSE: \"What do you think is causing the headache?\"      High blood pressure vs. migraine  7. MIGRAINE: \"Have you been diagnosed with migraine headaches?\" If so, ask: \"Is this headache similar?\"       Yes.This is worse than a migraine  8. HEAD INJURY: \"Has there been any recent injury to the head?\"       no  9. " "OTHER SYMPTOMS: \"Do you have any other symptoms?\" (fever, stiff neck, eye pain, sore throat, cold symptoms)      Nausea, Cold symptoms.  10. PREGNANCY: \"Is there any chance you are pregnant?\" \"When was your last menstrual period?\"        no    Protocols used: HEADACHE-ADULT-AH, HIGH BLOOD PRESSURE-ADULT-AH      "

## 2019-01-09 DIAGNOSIS — C50.919: Primary | ICD-10-CM

## 2019-01-10 ENCOUNTER — LAB (OUTPATIENT)
Dept: LAB | Facility: HOSPITAL | Age: 44
End: 2019-01-10

## 2019-01-10 ENCOUNTER — OFFICE VISIT (OUTPATIENT)
Dept: ONCOLOGY | Facility: CLINIC | Age: 44
End: 2019-01-10

## 2019-01-10 VITALS
DIASTOLIC BLOOD PRESSURE: 82 MMHG | WEIGHT: 136.9 LBS | HEART RATE: 80 BPM | OXYGEN SATURATION: 97 % | BODY MASS INDEX: 23.37 KG/M2 | RESPIRATION RATE: 16 BRPM | SYSTOLIC BLOOD PRESSURE: 118 MMHG | HEIGHT: 64 IN

## 2019-01-10 DIAGNOSIS — C50.919: ICD-10-CM

## 2019-01-10 DIAGNOSIS — C50.919 MALIGNANT NEOPLASM OF BREAST IN FEMALE, ESTROGEN RECEPTOR POSITIVE, UNSPECIFIED LATERALITY, UNSPECIFIED SITE OF BREAST (HCC): Primary | ICD-10-CM

## 2019-01-10 DIAGNOSIS — D64.9 ANEMIA, UNSPECIFIED TYPE: ICD-10-CM

## 2019-01-10 DIAGNOSIS — Z17.0 MALIGNANT NEOPLASM OF BREAST IN FEMALE, ESTROGEN RECEPTOR POSITIVE, UNSPECIFIED LATERALITY, UNSPECIFIED SITE OF BREAST (HCC): Primary | ICD-10-CM

## 2019-01-10 DIAGNOSIS — D64.9 ANEMIA, UNSPECIFIED TYPE: Primary | ICD-10-CM

## 2019-01-10 LAB
ALBUMIN SERPL-MCNC: 4.4 G/DL (ref 3.5–5)
ALBUMIN/GLOB SERPL: 1.5 G/DL (ref 1.1–2.5)
ALP SERPL-CCNC: 49 U/L (ref 24–120)
ALT SERPL W P-5'-P-CCNC: 25 U/L (ref 0–54)
ANION GAP SERPL CALCULATED.3IONS-SCNC: 10 MMOL/L (ref 4–13)
AST SERPL-CCNC: 32 U/L (ref 7–45)
BASOPHILS # BLD AUTO: 0.02 10*3/MM3 (ref 0–0.2)
BASOPHILS NFR BLD AUTO: 0.4 % (ref 0–2)
BILIRUB SERPL-MCNC: 1.1 MG/DL (ref 0.1–1)
BUN BLD-MCNC: 16 MG/DL (ref 5–21)
BUN/CREAT SERPL: 21.3 (ref 7–25)
CALCIUM SPEC-SCNC: 8.9 MG/DL (ref 8.4–10.4)
CHLORIDE SERPL-SCNC: 99 MMOL/L (ref 98–110)
CO2 SERPL-SCNC: 31 MMOL/L (ref 24–31)
CREAT BLD-MCNC: 0.75 MG/DL (ref 0.5–1.4)
DEPRECATED RDW RBC AUTO: 37 FL (ref 40–54)
EOSINOPHIL # BLD AUTO: 0.03 10*3/MM3 (ref 0–0.7)
EOSINOPHIL NFR BLD AUTO: 0.6 % (ref 0–4)
ERYTHROCYTE [DISTWIDTH] IN BLOOD BY AUTOMATED COUNT: 12 % (ref 12–15)
FERRITIN SERPL-MCNC: 41.5 NG/ML (ref 6.24–137)
FOLATE SERPL-MCNC: 12.8 NG/ML
GFR SERPL CREATININE-BSD FRML MDRD: 85 ML/MIN/1.73
GLOBULIN UR ELPH-MCNC: 3 GM/DL
GLUCOSE BLD-MCNC: 88 MG/DL (ref 70–100)
HCT VFR BLD AUTO: 34.4 % (ref 37–47)
HGB BLD-MCNC: 11.6 G/DL (ref 12–16)
HOLD SPECIMEN: NORMAL
HOLD SPECIMEN: NORMAL
IMM GRANULOCYTES # BLD AUTO: 0.01 10*3/MM3 (ref 0–0.03)
IMM GRANULOCYTES NFR BLD AUTO: 0.2 % (ref 0–5)
IRON 24H UR-MRATE: 71 MCG/DL (ref 42–180)
IRON SATN MFR SERPL: 24 % (ref 20–45)
LYMPHOCYTES # BLD AUTO: 1.41 10*3/MM3 (ref 0.72–4.86)
LYMPHOCYTES NFR BLD AUTO: 26.3 % (ref 15–45)
MCH RBC QN AUTO: 28.6 PG (ref 28–32)
MCHC RBC AUTO-ENTMCNC: 33.7 G/DL (ref 33–36)
MCV RBC AUTO: 84.7 FL (ref 82–98)
MONOCYTES # BLD AUTO: 0.41 10*3/MM3 (ref 0.19–1.3)
MONOCYTES NFR BLD AUTO: 7.6 % (ref 4–12)
NEUTROPHILS # BLD AUTO: 3.48 10*3/MM3 (ref 1.87–8.4)
NEUTROPHILS NFR BLD AUTO: 64.9 % (ref 39–78)
NRBC BLD AUTO-RTO: 0 /100 WBC (ref 0–0)
PLATELET # BLD AUTO: 235 10*3/MM3 (ref 130–400)
PMV BLD AUTO: 10.1 FL (ref 6–12)
POTASSIUM BLD-SCNC: 3.8 MMOL/L (ref 3.5–5.3)
PROT SERPL-MCNC: 7.4 G/DL (ref 6.3–8.7)
RBC # BLD AUTO: 4.06 10*6/MM3 (ref 4.2–5.4)
SODIUM BLD-SCNC: 140 MMOL/L (ref 135–145)
TIBC SERPL-MCNC: 300 MCG/DL (ref 225–420)
VIT B12 BLD-MCNC: 572 PG/ML (ref 239–931)
WBC NRBC COR # BLD: 5.36 10*3/MM3 (ref 4.8–10.8)

## 2019-01-10 PROCEDURE — 82728 ASSAY OF FERRITIN: CPT | Performed by: INTERNAL MEDICINE

## 2019-01-10 PROCEDURE — 80053 COMPREHEN METABOLIC PANEL: CPT

## 2019-01-10 PROCEDURE — 36415 COLL VENOUS BLD VENIPUNCTURE: CPT

## 2019-01-10 PROCEDURE — 85025 COMPLETE CBC W/AUTO DIFF WBC: CPT

## 2019-01-10 PROCEDURE — 83550 IRON BINDING TEST: CPT

## 2019-01-10 PROCEDURE — 83540 ASSAY OF IRON: CPT

## 2019-01-10 PROCEDURE — 82607 VITAMIN B-12: CPT

## 2019-01-10 PROCEDURE — 99214 OFFICE O/P EST MOD 30 MIN: CPT | Performed by: INTERNAL MEDICINE

## 2019-01-10 PROCEDURE — 82746 ASSAY OF FOLIC ACID SERUM: CPT

## 2019-01-10 RX ORDER — TAMOXIFEN CITRATE 20 MG/1
20 TABLET ORAL DAILY
Qty: 90 TABLET | Refills: 4 | Status: SHIPPED | OUTPATIENT
Start: 2019-01-10

## 2019-01-10 NOTE — PROGRESS NOTES
[facility]  HEMATOLOGY & ONCOLOGY    Cancer Staging Information:  No matching staging information was found for the patient.      Subjective     VISIT DIAGNOSIS:   No diagnosis found.    REASON FOR VISIT:     Chief Complaint   Patient presents with   • Breast Cancer     follow up        HEMATOLOGY / ONCOLOGY HISTORY:    No history exists.           INTERVAL HISTORY  Patient ID: Vanesa Manuel is a 43 y.o. year old female with Hr+ breast ca here for f/u. Has malick mastectomy d/4 mammo not indicated. On arimidex s/p SARAH+BSO. Her only complain was a migraine HA sometime ago which she described as the worst HA of her life. However she did not sick medical therapy. She took otc migraine tabs and ws better. She has occasional migraines that is easily aborted with OTC. No neurology weakness though.  Past Medical History:   Past Medical History:   Diagnosis Date   • Anemia    • Breast cancer (CMS/HCC)    • Cancer (CMS/HCC)     breast     Past Surgical History:   Past Surgical History:   Procedure Laterality Date   • BREAST BIOPSY Left    • BREAST SURGERY      biopsy   •  SECTION     •  SECTION     • HYSTERECTOMY     • OTHER SURGICAL HISTORY      took out expanders and placed implants   • WISDOM TOOTH EXTRACTION       Social History:   Social History     Socioeconomic History   • Marital status:      Spouse name: Not on file   • Number of children: Not on file   • Years of education: Not on file   • Highest education level: Not on file   Social Needs   • Financial resource strain: Not on file   • Food insecurity - worry: Not on file   • Food insecurity - inability: Not on file   • Transportation needs - medical: Not on file   • Transportation needs - non-medical: Not on file   Occupational History   • Not on file   Tobacco Use   • Smoking status: Former Smoker   Substance and Sexual Activity   • Alcohol use: Yes     Comment: occasional   • Drug use: No   • Sexual activity: Not on file  "  Other Topics Concern   • Not on file   Social History Narrative   • Not on file     Family History:   Family History   Problem Relation Age of Onset   • Cancer Maternal Aunt         breast with mets   • Cancer Paternal Uncle         colon   • No Known Problems Mother    • No Known Problems Father    • No Known Problems Brother    • No Known Problems Daughter    • No Known Problems Daughter    • No Known Problems Daughter        Review of Systems   Constitutional: Negative.    Eyes: Negative.    Respiratory: Negative.    Cardiovascular: Negative.    Gastrointestinal: Negative.    Endocrine: Negative.    Genitourinary: Negative.    Musculoskeletal: Negative.    Skin: Negative.    Neurological: Positive for headaches.   Hematological: Negative.    Psychiatric/Behavioral: Negative.         Performance Status:  Asymptomatic    Medications:    Current Outpatient Medications   Medication Sig Dispense Refill   • ferrous sulfate 325 (65 FE) MG tablet Take 1 tablet by mouth Daily With Breakfast. 30 tablet 2   • tamoxifen (NOLVADEX) 20 MG chemo tablet Take 1 tablet by mouth Daily. 90 tablet 4     No current facility-administered medications for this visit.        ALLERGIES:    Allergies   Allergen Reactions   • Oxycodone-Acetaminophen Itching     \"felt weird\"   • Percocet [Oxycodone-Acetaminophen]        Objective      Vitals:    01/10/19 1508   BP: 118/82   Pulse: 80   Resp: 16   SpO2: 97%   Weight: 62.1 kg (136 lb 14.4 oz)   Height: 162.6 cm (64\")         Current Status 1/10/2019   ECOG score 0         Physical Exam    General Appearance: Patient is awake, alert, oriented and in no acute distress. Patient is welldeveloped, wellnourished, and appears stated age.  HEENT: Normocephalic. Sclerae clear, conjunctiva pink, extraocular movements intact, pupils, round, reactive to light and  accommodation. Mouth and throat are clear with moist oral mucosa.  NECK: Supple, no jugular venous distention, thyroid not enlarged.  LYMPH: " No cervical, supraclavicular, axillary, or inguinal lymphadenopathy.  CHEST: Equal bilateral expansion, AP  diameter normal, resonant percussion note  LUNGS: Good air movement, no rales, rhonchi, rubs or wheezes with auscultation  CARDIO: Regular sinus rhythm, no murmurs, gallops or rubs.  ABDOMEN: Nondistended, soft, No tenderness, no guarding, no rebound, No hepatosplenomegaly. No abdominal masses. Bowel sounds positive. No hernia  GENITALIA: Not examined.  BREASTS: Not examined.pt deferred  MUSKEL: No joint swelling, decreased motion, or inflammation  EXTREMS: No edema, clubbing, cyanosis, No varicose veins.  NEURO: Grossly nonfocal, Gait is coordinated and smooth, Cognition is preserved.  SKIN: No rashes, no ecchymoses, no petechia.  PSYCH: Oriented to time, place and person. Memory is preserved. Mood and affect appear normal  RECENT LABS:  Lab on 01/10/2019   Component Date Value Ref Range Status   • Glucose 01/10/2019 88  70 - 100 mg/dL Final   • BUN 01/10/2019 16  5 - 21 mg/dL Final   • Creatinine 01/10/2019 0.75  0.50 - 1.40 mg/dL Final   • Sodium 01/10/2019 140  135 - 145 mmol/L Final   • Potassium 01/10/2019 3.8  3.5 - 5.3 mmol/L Final   • Chloride 01/10/2019 99  98 - 110 mmol/L Final   • CO2 01/10/2019 31.0  24.0 - 31.0 mmol/L Final   • Calcium 01/10/2019 8.9  8.4 - 10.4 mg/dL Final   • Total Protein 01/10/2019 7.4  6.3 - 8.7 g/dL Final   • Albumin 01/10/2019 4.40  3.50 - 5.00 g/dL Final   • ALT (SGPT) 01/10/2019 25  0 - 54 U/L Final   • AST (SGOT) 01/10/2019 32  7 - 45 U/L Final   • Alkaline Phosphatase 01/10/2019 49  24 - 120 U/L Final   • Total Bilirubin 01/10/2019 1.1* 0.1 - 1.0 mg/dL Final   • eGFR Non African Amer 01/10/2019 85  >60 mL/min/1.73 Final   • Globulin 01/10/2019 3.0  gm/dL Final   • A/G Ratio 01/10/2019 1.5  1.1 - 2.5 g/dL Final   • BUN/Creatinine Ratio 01/10/2019 21.3  7.0 - 25.0 Final   • Anion Gap 01/10/2019 10.0  4.0 - 13.0 mmol/L Final   • WBC 01/10/2019 5.36  4.80 - 10.80 10*3/mm3  Final   • RBC 01/10/2019 4.06* 4.20 - 5.40 10*6/mm3 Final   • Hemoglobin 01/10/2019 11.6* 12.0 - 16.0 g/dL Final   • Hematocrit 01/10/2019 34.4* 37.0 - 47.0 % Final   • MCV 01/10/2019 84.7  82.0 - 98.0 fL Final   • MCH 01/10/2019 28.6  28.0 - 32.0 pg Final   • MCHC 01/10/2019 33.7  33.0 - 36.0 g/dL Final   • RDW 01/10/2019 12.0  12.0 - 15.0 % Final   • RDW-SD 01/10/2019 37.0* 40.0 - 54.0 fl Final   • MPV 01/10/2019 10.1  6.0 - 12.0 fL Final   • Platelets 01/10/2019 235  130 - 400 10*3/mm3 Final   • Neutrophil % 01/10/2019 64.9  39.0 - 78.0 % Final   • Lymphocyte % 01/10/2019 26.3  15.0 - 45.0 % Final   • Monocyte % 01/10/2019 7.6  4.0 - 12.0 % Final   • Eosinophil % 01/10/2019 0.6  0.0 - 4.0 % Final   • Basophil % 01/10/2019 0.4  0.0 - 2.0 % Final   • Immature Grans % 01/10/2019 0.2  0.0 - 5.0 % Final   • Neutrophils, Absolute 01/10/2019 3.48  1.87 - 8.40 10*3/mm3 Final   • Lymphocytes, Absolute 01/10/2019 1.41  0.72 - 4.86 10*3/mm3 Final   • Monocytes, Absolute 01/10/2019 0.41  0.19 - 1.30 10*3/mm3 Final   • Eosinophils, Absolute 01/10/2019 0.03  0.00 - 0.70 10*3/mm3 Final   • Basophils, Absolute 01/10/2019 0.02  0.00 - 0.20 10*3/mm3 Final   • Immature Grans, Absolute 01/10/2019 0.01  0.00 - 0.03 10*3/mm3 Final   • nRBC 01/10/2019 0.0  0.0 - 0.0 /100 WBC Final       RADIOLOGY:  No results found.         Assessment/Plan  Vanesa Manuel is a 43 y.o. year old female patient, stage IIA breast carcinoma 2014, for which she underwent neoadjuvant chemotherapy with four cycles of Adriamycin, Cytoxan followed by 12 weekly courses of Taxol. She did have ER positive, [place] positive, HER2/deepika negative disease and her BRCA testing was also negative. She underwent bilateral mastectomies, this was followed by reconstruction. S/p Tamoxifen, then SARAH and was switched to arimidex that is doing well.    Patient Active Problem List   Diagnosis   • Breast cancer greater than or equal to 2 cm in greatest dimension (CMS/HCC)          1.  Breast ca: pt stopped arimidex due to arthralgia and was started on evista by her surgeon. I had a long discussion with pt that evista is not guideline directed and that I strongly am against its use in the management of her breast cancer.  -her options are to go back to tamoxifen since she tolerated it. She does not have uterus, so there is no fear of uterine cancer or to try a different AI, I.e aromasin , exemestane. In the end she decided to go back on tamoxifen  --rx tamoxifen to pharmacy  --rtcin 3 months.  2. Vasomotor symptoms: on effexor    3. NATALIO: toke a break from taking the iron pill because she was sick. I advised to caitie since hemoglobin still low.   -hg 11, check irn profile, ferritin, b12, folate copper.    4. Migraine: Currently occassionally. She self medicates with otc migraine tab and drinking coffee. Advised patient that she needs to see neurologist to r/u structural issues leading to migraine. Given non worsening of migraine with continued arimidex use and response to otc, I do not think it is from arimidex. However, I advised her that she could try being off arimdex for a month and see if the migraine subsides.  --MRI 10/1/18 with no evidence of intracranial metastatic dz.      Los Krishnan MD    1/10/2019    3:29 PM

## 2019-04-09 DIAGNOSIS — C50.919: Primary | ICD-10-CM

## 2019-04-10 ENCOUNTER — APPOINTMENT (OUTPATIENT)
Dept: LAB | Facility: HOSPITAL | Age: 44
End: 2019-04-10

## 2019-05-30 ENCOUNTER — OFFICE VISIT (OUTPATIENT)
Dept: OBGYN | Age: 44
End: 2019-05-30

## 2019-05-30 VITALS
HEART RATE: 84 BPM | DIASTOLIC BLOOD PRESSURE: 90 MMHG | TEMPERATURE: 98.5 F | BODY MASS INDEX: 23.05 KG/M2 | HEIGHT: 64 IN | SYSTOLIC BLOOD PRESSURE: 148 MMHG | WEIGHT: 135 LBS

## 2019-05-30 DIAGNOSIS — Z01.419 WELL FEMALE EXAM WITH ROUTINE GYNECOLOGICAL EXAM: Primary | ICD-10-CM

## 2019-05-30 DIAGNOSIS — Z85.3 HX OF BREAST CANCER: ICD-10-CM

## 2019-05-30 PROCEDURE — 99396 PREV VISIT EST AGE 40-64: CPT | Performed by: OBSTETRICS & GYNECOLOGY

## 2019-05-30 RX ORDER — ESTRADIOL 0.1 MG/G
1 CREAM VAGINAL
Qty: 1 TUBE | Refills: 5 | Status: SHIPPED | OUTPATIENT
Start: 2019-05-30 | End: 2020-05-28

## 2019-05-30 RX ORDER — METOPROLOL SUCCINATE 50 MG/1
50 TABLET, EXTENDED RELEASE ORAL DAILY
Qty: 30 TABLET | Refills: 11 | Status: SHIPPED | OUTPATIENT
Start: 2019-05-30 | End: 2020-05-28

## 2019-05-30 ASSESSMENT — ENCOUNTER SYMPTOMS
EYES NEGATIVE: 1
RESPIRATORY NEGATIVE: 1
GASTROINTESTINAL NEGATIVE: 1

## 2019-05-30 NOTE — PROGRESS NOTES
Subjective:      Patient ID: Eneida Manuel is a 45 y.o. female. HPI  I, Martha Story RN, am scribing for and in the presence of Dr. Emily Herzog 5/30/2019/4:12 [facility] is here today for her annual exam. She is a former Dr. Merly Beckwith patient. Pt has hx of breast cancer and had bilateral mastectomy with reconstruction and chemo. Pt has had a hysterectomy. Pt c/o no desire for intercourse. Eneida Manuel is a 45 y.o. female with the following history as recorded in [facility]:  Patient Active Problem List    Diagnosis Date Noted    H/O cervical biopsy 04/27/2017    BRCA negative 03/27/2017    Atypical squamous cell changes of undetermined significance (ASCUS) on cervical cytology with positive high risk human papilloma virus (HPV) 03/27/2017    Use of tamoxifen (Nolvadex) 03/09/2017    Family history of uterine cancer 03/09/2017    Family history of colon cancer 03/09/2017    Rectal bleeding 06/10/2015    S/P bilateral mastectomy, skin and nipple sparin 8/26/14 09/03/2014    Cancer of left breast (HCC) 12 o'clock 3.1 cm 01/24/2014     Current Outpatient Medications   Medication Sig Dispense Refill    estradiol (ESTRACE VAGINAL) 0.1 MG/GM vaginal cream Place 1 g vaginally Twice a Week 1 Tube 5    metoprolol succinate (TOPROL XL) 50 MG extended release tablet Take 1 tablet by mouth daily 30 tablet 11     No current facility-administered medications for this visit.       Allergies: Percocet [oxycodone-acetaminophen]  Past Medical History:   Diagnosis Date    BRCA negative 03/2017    My risk    Breast cancer ([facility] 01/24/2014    Chemo-Hormone fed tumor (Dr. Anthony Sharma)    Genetic testing of female 2014    BRCCA Negative    PONV (postoperative nausea and vomiting)      Past Surgical History:   Procedure Laterality Date    BREAST BIOPSY Left 1/24/14    mammotome     BREAST SURGERY  8/26/14    bilateral mastectomy on 8/26/14    BREAST SURGERY  9/29/14    bilateral tissue expanders placed    BREAST SURGERY      Fat Graphing     SECTION  2012    COLONOSCOPY  2015    ENDOMETRIAL BIOPSY  2016    done for bleeding, report is benign    HYSTERECTOMY VAGINAL N/A 2017    HYSTERECTOMY VAGINAL LAPAROSCOPIC ASSISTED (LAVH) performed by Velvet Mendoza MD at [address] Left 14    axilla    SALPINGO-OOPHORECTOMY Bilateral 2017    SALPINGO OOPHORECTOMY LAPAROSCOPIC performed by Velvet Mendoza MD at [address] TUNNELED VENOUS PORT PLACEMENT  14    WISDOM TOOTH EXTRACTION       Family History   Problem Relation Age of Onset    Colon Cancer Paternal Uncle 61    Colon Polyps Paternal Uncle     Breast Cancer Maternal Aunt 79    High Blood Pressure Mother     High Cholesterol Father     Heart Disease Paternal Grandfather     Uterine Cancer Maternal Grandmother 39    Esophageal Cancer Neg Hx     Liver Cancer Neg Hx     Liver Disease Neg Hx     Rectal Cancer Neg Hx     Stomach Cancer Neg Hx      Social History     Tobacco Use    Smoking status: Former Smoker     Types: Cigarettes     Last attempt to quit: 2008     Years since quittin.0    Smokeless tobacco: Never Used   Substance Use Topics    Alcohol use: Yes     Alcohol/week: 0.0 oz     Comment: OCC       Review of Systems   Constitutional: Negative. HENT: Negative. Eyes: Negative. Respiratory: Negative. Cardiovascular: Negative. Gastrointestinal: Negative. Genitourinary: Negative for difficulty urinating, dyspareunia, dysuria, enuresis, frequency, hematuria, menstrual problem, pelvic pain, urgency and vaginal discharge. Musculoskeletal: Negative. Skin: Negative. Neurological: Negative. Psychiatric/Behavioral: Negative. Objective:  BP (!) 148/90   Pulse 84   Temp 98.5 °F (36.9 °C) (Temporal)   Ht 5' 4\" (1.626 m)   Wt 135 lb (61.2 kg)   LMP 2017   BMI 23.17 kg/m²      Physical Exam   Constitutional: She is oriented to person, place, and time.  She appears well-developed and well-nourished. No distress. HENT:   Head: Normocephalic and atraumatic. Right Ear: Hearing normal.   Left Ear: Hearing normal.   Nose: Nose normal.   Eyes: Pupils are equal, round, and reactive to light. Conjunctivae and lids are normal.   Neck: Normal range of motion. Neck supple. No tracheal deviation present. No thyromegaly present. Cardiovascular: Normal rate, regular rhythm and normal heart sounds. Pulmonary/Chest: Effort normal and breath sounds normal. No respiratory distress. She has no wheezes. Right breast exhibits no inverted nipple, no mass, no nipple discharge, no skin change and no tenderness. Left breast exhibits no inverted nipple, no mass, no nipple discharge, no skin change and no tenderness. Breasts are symmetrical.   Scars to bilateral breasts noted    Abdominal: Soft. She exhibits no distension. There is no tenderness. There is no rebound and no guarding. Genitourinary: Rectal exam shows no external hemorrhoid and no fissure. There is no rash, tenderness or lesion on the right labia. There is no rash, tenderness or lesion on the left labia. Right adnexum displays no mass, no tenderness and no fullness. Left adnexum displays no mass, no tenderness and no fullness. No bleeding in the vagina. No vaginal discharge found. Genitourinary Comments: Vaginal cuff intact    Musculoskeletal: Normal range of motion. Normal ROM in all four extremities; normal gait   Lymphadenopathy:        Head (right side): No submental, no submandibular and no tonsillar adenopathy present. Head (left side): No submental, no submandibular and no tonsillar adenopathy present. She has no cervical adenopathy. She has no axillary adenopathy. Right: No supraclavicular adenopathy present. Left: No supraclavicular adenopathy present. Neurological: She is alert and oriented to person, place, and time. Skin: Skin is warm and dry. No rash noted. No erythema. Psychiatric: She has a normal mood and affect. Her behavior is normal.       Assessment:       Diagnosis Orders   1. Well female exam with routine gynecological exam     2. Hx of breast cancer             Plan:      MEDICATIONS:  Orders Placed This Encounter   Medications    estradiol (ESTRACE VAGINAL) 0.1 MG/GM vaginal cream     Sig: Place 1 g vaginally Twice a Week     Dispense:  1 Tube     Refill:  5    metoprolol succinate (TOPROL XL) 50 MG extended release tablet     Sig: Take 1 tablet by mouth daily     Dispense:  30 tablet     Refill:  11       ORDERS:  No orders of the defined types were placed in this encounter. The importance of self-breast examination was discussed. Will continue to see Dr. Shakila Jovel for breast exams. A well balanced diet and exercise were encouraged, as well as the addition of multivitamin. Pt will try vaginal estrogen cream for low libido. Pt will also start Toprol for HTN, rx sent in. All questions answered. Farrah Borges MD, personally performed services described in this document as scribed by Pepe Meadows RN in my presence, and it is both accurate and complete.

## 2019-05-30 NOTE — PATIENT INSTRUCTIONS

## 2019-07-10 ENCOUNTER — TRANSCRIBE ORDERS (OUTPATIENT)
Dept: ADMINISTRATIVE | Facility: HOSPITAL | Age: 44
End: 2019-07-10

## 2019-07-10 ENCOUNTER — HOSPITAL ENCOUNTER (OUTPATIENT)
Dept: INFUSION THERAPY | Age: 44
Discharge: HOME | End: 2019-07-10
Payer: COMMERCIAL

## 2019-07-10 DIAGNOSIS — Z13.820 SCREENING FOR OSTEOPOROSIS: Primary | ICD-10-CM

## 2019-07-10 PROCEDURE — 85025 COMPLETE CBC W/AUTO DIFF WBC: CPT

## 2019-10-03 ENCOUNTER — HOSPITAL ENCOUNTER (OUTPATIENT)
Dept: BONE DENSITY | Facility: HOSPITAL | Age: 44
Discharge: HOME | End: 2019-10-03
Admitting: INTERNAL MEDICINE

## 2019-10-03 DIAGNOSIS — Z13.820 SCREENING FOR OSTEOPOROSIS: ICD-10-CM

## 2019-10-03 PROCEDURE — 77080 DXA BONE DENSITY AXIAL: CPT

## 2019-10-10 ENCOUNTER — HOSPITAL ENCOUNTER (OUTPATIENT)
Dept: INFUSION THERAPY | Age: 44
Discharge: HOME | End: 2019-10-10
Payer: COMMERCIAL

## 2019-10-10 ENCOUNTER — OFFICE VISIT (OUTPATIENT)
Dept: HEMATOLOGY | Age: 44
End: 2019-10-10
Payer: COMMERCIAL

## 2019-10-10 VITALS
HEIGHT: 64 IN | OXYGEN SATURATION: 97 % | DIASTOLIC BLOOD PRESSURE: 72 MMHG | HEART RATE: 76 BPM | WEIGHT: 133.9 LBS | SYSTOLIC BLOOD PRESSURE: 122 MMHG | BODY MASS INDEX: 22.86 KG/M2

## 2019-10-10 DIAGNOSIS — T45.1X5A HOT FLASHES DUE TO TAMOXIFEN: ICD-10-CM

## 2019-10-10 DIAGNOSIS — R23.2 HOT FLASHES DUE TO TAMOXIFEN: ICD-10-CM

## 2019-10-10 DIAGNOSIS — C50.912: Primary | ICD-10-CM

## 2019-10-10 DIAGNOSIS — C50.812 MALIGNANT NEOPLASM OF OVERLAPPING SITES OF LEFT FEMALE BREAST, UNSPECIFIED ESTROGEN RECEPTOR STATUS (HCC): ICD-10-CM

## 2019-10-10 PROCEDURE — 85025 COMPLETE CBC W/AUTO DIFF WBC: CPT

## 2019-10-10 PROCEDURE — 99213 OFFICE O/P EST LOW 20 MIN: CPT | Performed by: NURSE PRACTITIONER

## 2019-10-10 RX ORDER — TAMOXIFEN CITRATE 20 MG/1
TABLET ORAL
COMMUNITY
End: 2019-10-10 | Stop reason: SDUPTHER

## 2019-10-10 RX ORDER — LISINOPRIL 10 MG/1
TABLET ORAL
COMMUNITY
End: 2020-05-28

## 2019-10-10 RX ORDER — TAMOXIFEN CITRATE 20 MG/1
20 TABLET ORAL DAILY
Qty: 90 TABLET | Refills: 3 | Status: SHIPPED | OUTPATIENT
Start: 2019-10-10 | End: 2020-12-21

## 2019-10-10 RX ORDER — OXYBUTYNIN CHLORIDE 5 MG/1
5 TABLET ORAL 2 TIMES DAILY
Qty: 60 TABLET | Refills: 3 | Status: SHIPPED | OUTPATIENT
Start: 2019-10-10 | End: 2020-05-28

## 2019-10-22 ENCOUNTER — OFFICE VISIT (OUTPATIENT)
Dept: SURGERY | Age: 44
End: 2019-10-22
Payer: COMMERCIAL

## 2019-10-22 VITALS
HEIGHT: 64 IN | HEART RATE: 84 BPM | BODY MASS INDEX: 23.39 KG/M2 | SYSTOLIC BLOOD PRESSURE: 132 MMHG | DIASTOLIC BLOOD PRESSURE: 85 MMHG | WEIGHT: 137 LBS

## 2019-10-22 DIAGNOSIS — Z85.3 PERSONAL HISTORY OF BREAST CANCER: Primary | ICD-10-CM

## 2019-10-22 PROCEDURE — 99213 OFFICE O/P EST LOW 20 MIN: CPT | Performed by: PHYSICIAN ASSISTANT

## 2019-10-29 PROBLEM — R23.2 HOT FLASHES DUE TO TAMOXIFEN: Status: ACTIVE | Noted: 2019-10-29

## 2019-10-29 PROBLEM — T45.1X5A HOT FLASHES DUE TO TAMOXIFEN: Status: ACTIVE | Noted: 2019-10-29

## 2020-01-27 ENCOUNTER — HOSPITAL ENCOUNTER (EMERGENCY)
Facility: HOSPITAL | Age: 45
Discharge: HOME | End: 2020-01-27
Admitting: EMERGENCY MEDICINE

## 2020-01-27 VITALS
RESPIRATION RATE: 17 BRPM | OXYGEN SATURATION: 100 % | SYSTOLIC BLOOD PRESSURE: 126 MMHG | WEIGHT: 138 LBS | BODY MASS INDEX: 23.56 KG/M2 | HEART RATE: 92 BPM | DIASTOLIC BLOOD PRESSURE: 79 MMHG | HEIGHT: 64 IN

## 2020-01-27 DIAGNOSIS — F41.0 PANIC ATTACK: Primary | ICD-10-CM

## 2020-01-27 DIAGNOSIS — F41.1 ANXIETY REACTION: ICD-10-CM

## 2020-01-27 LAB — GLUCOSE BLDC GLUCOMTR-MCNC: 104 MG/DL (ref 70–130)

## 2020-01-27 PROCEDURE — 99283 EMERGENCY DEPT VISIT LOW MDM: CPT

## 2020-01-27 PROCEDURE — 82962 GLUCOSE BLOOD TEST: CPT

## 2020-01-27 RX ORDER — LORAZEPAM 0.5 MG/1
0.5 TABLET ORAL ONCE
Status: COMPLETED | OUTPATIENT
Start: 2020-01-27 | End: 2020-01-27

## 2020-01-27 RX ORDER — ONDANSETRON 4 MG/1
4 TABLET, ORALLY DISINTEGRATING ORAL ONCE
Status: COMPLETED | OUTPATIENT
Start: 2020-01-27 | End: 2020-01-27

## 2020-01-27 RX ADMIN — ONDANSETRON 4 MG: 4 TABLET, ORALLY DISINTEGRATING ORAL at 13:54

## 2020-01-27 RX ADMIN — LORAZEPAM 0.5 MG: 0.5 TABLET ORAL at 13:54

## 2020-01-27 NOTE — DISCHARGE INSTRUCTIONS
Panic Attack    A panic attack is when you suddenly feel very afraid, uncomfortable, or nervous (anxious). A panic attack can happen when you are scared or for no reason.  A panic attack can feel like a serious problem. It can even feel like a heart attack or stroke. See your doctor when you have a panic attack to make sure you do not have a serious problem.  Follow these instructions at home:  · Take medicines only as told by your doctor.  · If you feel worried or nervous, try not to have caffeine.  · Take good care of your health. To do this:  ? Eat healthy. Make sure to eat fresh fruits and vegetables, whole grains, lean meats, and low-fat dairy.  ? Get enough sleep. Try to sleep for 7-8 hours each night.  ? Exercise. Try to be active for 30 minutes 5 or more days a week.  ? Do not smoke. Talk to your doctor if you need help quitting.  ? Limit how much alcohol you drink:  § If you are a woman who is not pregnant: try not to have more than 1 drink a day.  § If you are a man: try not to have more than 2 drinks a day.  § One drink equals 12 oz of beer, 5 oz of wine, or 1½ oz of hard liquor.  · Keep all follow-up visits as told by your doctor. This is important.  Contact a doctor if:  · Your symptoms do not get better.  · Your symptoms get worse.  · You are not able to take your medicines as told.  Get help right away if:  · You have thoughts of hurting yourself or others.  · You have symptoms of a panic attack. Do not drive yourself to the hospital. Have someone else drive you or call an ambulance.  If you feel like you may hurt yourself or others, or have thoughts about taking your own life, get help right away. You can go to your nearest emergency department or call:  · Your local emergency services (911 in the [place]  · A suicide crisis helpline, such as the National Suicide Prevention Lifeline at 1-409.386.6756. This is open 24 hours a day.  Summary  · A panic attack is when you suddenly feel very afraid,  uncomfortable, or nervous (anxious).  · See your doctor when you have a panic attack to make sure that you do not have another serious problem.  · If you feel like you may hurt yourself or others, get help right away by calling 911.  This information is not intended to replace advice given to you by your health care provider. Make sure you discuss any questions you have with your health care provider.  Document Released: 01/20/2012 Document Revised: 01/31/2018 Document Reviewed: 01/31/2018  [employer] Interactive Patient Education © 2019 [employer]

## 2020-01-28 NOTE — ED PROVIDER NOTES
"Subjective     Other   Severity:  Mild  Chronicity:  New  Context:  Presents with anxiety; states she was in ICU and they were \"unhooking all lines\" she began feeling as if she may pass out with hands tingling bilaterally; reports hx of anxiety and states other physicians hasn't wanted to prescribe anxietty medicine  Associated symptoms: no chest pain, no diarrhea, no fever, no loss of consciousness, no nausea, no shortness of breath, no vomiting and no wheezing        Review of Systems   Constitutional: Negative for fever.   HENT: Negative.    Respiratory: Negative.  Negative for shortness of breath and wheezing.    Cardiovascular: Negative.  Negative for chest pain.   Gastrointestinal: Negative.  Negative for diarrhea, nausea and vomiting.   Genitourinary: Negative.    Musculoskeletal: Negative.    Skin: Negative.    Neurological: Negative for loss of consciousness.   Psychiatric/Behavioral:        Positive for anxiety   All other systems reviewed and are negative.      Past Medical History:   Diagnosis Date   • Anemia    • Breast cancer (CMS/HCC)    • Cancer (CMS/HCC)     breast       Allergies   Allergen Reactions   • Oxycodone-Acetaminophen Itching     \"felt weird\"   • Percocet [Oxycodone-Acetaminophen]        Past Surgical History:   Procedure Laterality Date   • BREAST BIOPSY Left    • BREAST SURGERY      biopsy   •  SECTION     •  SECTION     • HYSTERECTOMY     • OTHER SURGICAL HISTORY      took out expanders and placed implants   • WISDOM TOOTH EXTRACTION         Family History   Problem Relation Age of Onset   • Cancer Maternal Aunt         breast with mets   • Cancer Paternal Uncle         colon   • No Known Problems Mother    • No Known Problems Father    • No Known Problems Brother    • No Known Problems Daughter    • No Known Problems Daughter    • No Known Problems Daughter        Social History     Socioeconomic History   • Marital status:      Spouse name: Not on file   • " "Number of children: Not on file   • Years of education: Not on file   • Highest education level: Not on file   Tobacco Use   • Smoking status: Former Smoker   Substance and Sexual Activity   • Alcohol use: Yes     Comment: occasional   • Drug use: No           Objective   Physical Exam   Constitutional: She is oriented to person, place, and time. She appears well-developed and well-nourished.   HENT:   Head: Normocephalic and atraumatic.   Right Ear: External ear normal.   Left Ear: External ear normal.   Nose: Nose normal.   Mouth/Throat: Oropharynx is clear and moist.   Eyes: Pupils are equal, round, and reactive to light. Conjunctivae and EOM are normal.   Neck: Normal range of motion. Neck supple.   Cardiovascular: Normal rate, regular rhythm, normal heart sounds and intact distal pulses.   Pulmonary/Chest: Effort normal and breath sounds normal.   Abdominal: Soft. Bowel sounds are normal.   Musculoskeletal: Normal range of motion.   Neurological: She is alert and oriented to person, place, and time.   Skin: Skin is warm and dry. Capillary refill takes less than 2 seconds.   Psychiatric: She has a normal mood and affect. Her behavior is normal. Judgment and thought content normal.   Nursing note and vitals reviewed.      Procedures           ED Course  ED Course as of Jan 28 0808   Mon Jan 27, 2020   1304 Patient reports hx of anxiety however pcp's in the past resistent to prescribe anxiety medication. States she was in the ICU with her uncle and he was \"disconnected from all machines.\" shortly after this happened patient reports having racing heart and bilateral hand tingling. She felt as if she was going to pass out.     [TW]   1306 Reports feeling somewhat better since in the ER.    [TW]      ED Course User Index  [TW] Shonna Beck, APRN                                               MDM  Number of Diagnoses or Management Options  Anxiety reaction: new and does not require workup  Panic attack: new and " does not require workup     Amount and/or Complexity of Data Reviewed  Discuss the patient with other providers: yes    Risk of Complications, Morbidity, and/or Mortality  Presenting problems: low  Diagnostic procedures: low  Management options: low    Patient Progress  Patient progress: improved      Final diagnoses:   Panic attack   Anxiety reaction            Shonna Beck, APRN  01/28/20 0809

## 2020-01-31 ENCOUNTER — TRANSCRIBE ORDERS (OUTPATIENT)
Dept: ADMINISTRATIVE | Facility: HOSPITAL | Age: 45
End: 2020-01-31

## 2020-01-31 ENCOUNTER — HOSPITAL ENCOUNTER (OUTPATIENT)
Dept: GENERAL RADIOLOGY | Facility: HOSPITAL | Age: 45
Discharge: HOME | End: 2020-01-31
Admitting: NURSE PRACTITIONER

## 2020-01-31 DIAGNOSIS — Z87.891 PERSONAL HISTORY OF NICOTINE DEPENDENCE: Primary | ICD-10-CM

## 2020-01-31 PROCEDURE — 71046 X-RAY EXAM CHEST 2 VIEWS: CPT

## 2020-03-06 VITALS
HEART RATE: 78 BPM | BODY MASS INDEX: 23.52 KG/M2 | WEIGHT: 137 LBS | SYSTOLIC BLOOD PRESSURE: 128 MMHG | OXYGEN SATURATION: 98 % | DIASTOLIC BLOOD PRESSURE: 78 MMHG

## 2020-03-13 VITALS
WEIGHT: 137 LBS | SYSTOLIC BLOOD PRESSURE: 128 MMHG | HEART RATE: 78 BPM | BODY MASS INDEX: 23.52 KG/M2 | DIASTOLIC BLOOD PRESSURE: 78 MMHG

## 2020-05-28 ENCOUNTER — OFFICE VISIT (OUTPATIENT)
Dept: HEMATOLOGY | Age: 45
End: 2020-05-28
Payer: COMMERCIAL

## 2020-05-28 ENCOUNTER — HOSPITAL ENCOUNTER (OUTPATIENT)
Dept: INFUSION THERAPY | Age: 45
Discharge: HOME | End: 2020-05-28
Payer: COMMERCIAL

## 2020-05-28 VITALS
HEART RATE: 72 BPM | DIASTOLIC BLOOD PRESSURE: 80 MMHG | TEMPERATURE: 98.4 F | HEIGHT: 64 IN | OXYGEN SATURATION: 98 % | BODY MASS INDEX: 23.52 KG/M2 | SYSTOLIC BLOOD PRESSURE: 142 MMHG

## 2020-05-28 DIAGNOSIS — C50.912 MALIGNANT NEOPLASM OF LEFT FEMALE BREAST, UNSPECIFIED ESTROGEN RECEPTOR STATUS, UNSPECIFIED SITE OF BREAST (HCC): ICD-10-CM

## 2020-05-28 LAB
BASOPHILS ABSOLUTE: 0.01 K/UL (ref 0.01–0.08)
BASOPHILS RELATIVE PERCENT: 0.1 % (ref 0.1–1.2)
EOSINOPHILS ABSOLUTE: 0.03 K/UL (ref 0.04–0.54)
EOSINOPHILS RELATIVE PERCENT: 0.4 % (ref 0.7–7)
HCT VFR BLD CALC: 36.1 % (ref 34.1–44.9)
HEMOGLOBIN: 11.7 G/DL (ref 11.2–15.7)
LYMPHOCYTES ABSOLUTE: 2.88 K/UL (ref 1.18–3.74)
LYMPHOCYTES RELATIVE PERCENT: 40.7 % (ref 19.3–53.1)
MCH RBC QN AUTO: 29.7 PG (ref 25.6–32.2)
MCHC RBC AUTO-ENTMCNC: 32.4 G/DL (ref 32.3–35.5)
MCV RBC AUTO: 91.6 FL (ref 79.4–94.8)
MONOCYTES ABSOLUTE: 0.59 K/UL (ref 0.24–0.82)
MONOCYTES RELATIVE PERCENT: 8.3 % (ref 4.7–12.5)
NEUTROPHILS ABSOLUTE: 3.56 K/UL (ref 1.56–6.13)
NEUTROPHILS RELATIVE PERCENT: 50.5 % (ref 34–71.1)
PDW BLD-RTO: 12.3 % (ref 11.7–14.4)
PLATELET # BLD: 199 K/UL (ref 182–369)
PMV BLD AUTO: 9.4 FL (ref 7.4–10.4)
RBC # BLD: 3.94 M/UL (ref 3.93–5.22)
WBC # BLD: 7.07 K/UL (ref 3.98–10.04)

## 2020-05-28 PROCEDURE — 99212 OFFICE O/P EST SF 10 MIN: CPT

## 2020-05-28 PROCEDURE — 85025 COMPLETE CBC W/AUTO DIFF WBC: CPT

## 2020-05-28 PROCEDURE — 99213 OFFICE O/P EST LOW 20 MIN: CPT | Performed by: NURSE PRACTITIONER

## 2020-05-28 RX ORDER — BUSPIRONE HYDROCHLORIDE 5 MG/1
5 TABLET ORAL 3 TIMES DAILY
COMMUNITY

## 2020-05-28 NOTE — PROGRESS NOTES
hysterectomy   · 10/2/2017-1/10/2019 Arimidex, discontinued due to significant arthralgias and migraines. · 1/10/2019- Resumed tamoxifen   · History of iron replacement with Ferrous sulfate 325 mg daily       Ms. Claude Mody was seen in initial oncology consultation to establish care for a known history of stage IIA infiltrating lobular right breast carcinoma on 7/10/2019. Grecia Linda was referred from [place], [place]. Grecia Linda was  taking adjuvant endocrine therapy with tamoxifen, initiated in September 2014 and anticipate continuing with dosing until September 2024.  Britni Fraser was diagnosed with stage IIA right breast carcinoma on 1/21/2014. She presented to Dr. Nik Pulliam after finding a mass in the left breast in December 2013. She is status post neoadjuvant treatment with Adriamycin, cyclophosphamide, and Taxol followed by bilateral mastectomies with reconstruction. She initiated adjuvant endocrine therapy in September 2014. The following are pertinent events related to Nataly's diagnosis:  · 1/10/2014- Dr. Nik Pulliam completed ultrasound guided biopsy of the left breast identifying a 2.6 x 2.5 x 1.7 mass. Pathology revealed infiltrating lobular carcinoma, grade 1, with greatest dimensions tumor 1.9 cm. ER 97%, MS 98%, and HER-2/greg 1+. Ki-67 19%, p53 9%. · 1/28/2014- MRI of the both breasts identified a mass in the upper inner quadrant measuring 2.7 x 1.9 x 3.1 cm. Additionally several areas of satellite nodularity was suspected. An enhancing mass measuring 1.1 x 0.8 x 0.8 cm. scattered throughout the remaining breast are multiple areas of questionable enhancement worrisome for multiple satellite deposits. 2 areas of questionable adenopathy in the left axilla. · 2/11/2014-CT scan of the brain, chest, abdomen and pelvis and a bone scan were negative for metastatic disease.    · 2/12/2014- 2-D echocardiogram with an ejection fraction of 55-60%   · 2/13/2014- initial oncology consultation by Dr. Daisha Cole, with questions. This does not include charting. Discussed precautions related to [address] and being at increased risk. Discussed proper handwashing to be done frequently, limit exposure to other individuals and maintain social distancing of 6 feet. Recommend contacting primary care provider if having respiratory symptoms for further recommendations and consideration for testing.     (Please note that portions of this note were completed with a voice recognition program. Efforts were made to edit the dictations but occasionally words are mis-transcribed.)      Electronically signed by JALEEL Hawk on 5/29/2020 at 2:04 PM

## 2020-05-29 PROBLEM — Z86.0100 HISTORY OF COLONIC POLYPS: Status: ACTIVE | Noted: 2020-05-29

## 2020-05-29 PROBLEM — C50.912: Status: ACTIVE | Noted: 2020-05-29

## 2020-05-29 PROBLEM — D50.9 IRON DEFICIENCY ANEMIA: Status: ACTIVE | Noted: 2020-05-29

## 2020-05-29 PROBLEM — Z79.810 ENCOUNTER FOR MONITORING TAMOXIFEN THERAPY: Status: ACTIVE | Noted: 2020-05-29

## 2020-05-29 PROBLEM — Z51.81 ENCOUNTER FOR MONITORING TAMOXIFEN THERAPY: Status: ACTIVE | Noted: 2020-05-29

## 2020-05-29 PROBLEM — Z86.010 HISTORY OF COLONIC POLYPS: Status: ACTIVE | Noted: 2020-05-29

## 2020-05-29 ASSESSMENT — ENCOUNTER SYMPTOMS
GASTROINTESTINAL NEGATIVE: 1
EYES NEGATIVE: 1
EYE DISCHARGE: 0
SORE THROAT: 0
COUGH: 0
ABDOMINAL PAIN: 0
EYE PAIN: 0
NAUSEA: 0
CONSTIPATION: 0
BACK PAIN: 0
SHORTNESS OF BREATH: 0
WHEEZING: 0
BLOOD IN STOOL: 0
DIARRHEA: 0
EYE REDNESS: 0
VOMITING: 0
RESPIRATORY NEGATIVE: 1

## 2020-07-18 ENCOUNTER — OFFICE VISIT (OUTPATIENT)
Age: 45
End: 2020-07-18

## 2020-07-18 VITALS — OXYGEN SATURATION: 99 % | HEART RATE: 91 BPM | TEMPERATURE: 97.6 F

## 2020-07-19 LAB — SARS-COV-2, PCR: NOT DETECTED

## 2020-07-21 ENCOUNTER — HOSPITAL ENCOUNTER (OUTPATIENT)
Age: 45
Setting detail: OUTPATIENT SURGERY
Discharge: HOME | End: 2020-07-21
Attending: INTERNAL MEDICINE | Admitting: INTERNAL MEDICINE
Payer: COMMERCIAL

## 2020-07-21 ENCOUNTER — APPOINTMENT (OUTPATIENT)
Dept: OPERATING ROOM | Age: 45
End: 2020-07-21

## 2020-07-21 ENCOUNTER — ANESTHESIA (OUTPATIENT)
Dept: OPERATING ROOM | Age: 45
End: 2020-07-21

## 2020-07-21 ENCOUNTER — ANESTHESIA EVENT (OUTPATIENT)
Dept: OPERATING ROOM | Age: 45
End: 2020-07-21

## 2020-07-21 VITALS — DIASTOLIC BLOOD PRESSURE: 79 MMHG | SYSTOLIC BLOOD PRESSURE: 133 MMHG | OXYGEN SATURATION: 99 %

## 2020-07-21 VITALS
DIASTOLIC BLOOD PRESSURE: 56 MMHG | WEIGHT: 130 LBS | BODY MASS INDEX: 21.66 KG/M2 | TEMPERATURE: 97.7 F | HEIGHT: 65 IN | SYSTOLIC BLOOD PRESSURE: 97 MMHG | OXYGEN SATURATION: 100 % | HEART RATE: 75 BPM | RESPIRATION RATE: 16 BRPM

## 2020-07-21 PROCEDURE — G8907 PT DOC NO EVENTS ON DISCHARG: HCPCS

## 2020-07-21 PROCEDURE — G0105 COLORECTAL SCRN; HI RISK IND: HCPCS

## 2020-07-21 PROCEDURE — G8918 PT W/O PREOP ORDER IV AB PRO: HCPCS

## 2020-07-21 PROCEDURE — 45378 DIAGNOSTIC COLONOSCOPY: CPT | Performed by: INTERNAL MEDICINE

## 2020-07-21 RX ORDER — SODIUM CHLORIDE 9 MG/ML
INJECTION, SOLUTION INTRAVENOUS CONTINUOUS
Status: DISCONTINUED | OUTPATIENT
Start: 2020-07-21 | End: 2020-07-21 | Stop reason: HOSPADM

## 2020-07-21 RX ORDER — PROPOFOL 10 MG/ML
INJECTION, EMULSION INTRAVENOUS PRN
Status: DISCONTINUED | OUTPATIENT
Start: 2020-07-21 | End: 2020-07-21 | Stop reason: SDUPTHER

## 2020-07-21 RX ORDER — LIDOCAINE HYDROCHLORIDE 10 MG/ML
INJECTION, SOLUTION INFILTRATION; PERINEURAL PRN
Status: DISCONTINUED | OUTPATIENT
Start: 2020-07-21 | End: 2020-07-21 | Stop reason: SDUPTHER

## 2020-07-21 RX ADMIN — PROPOFOL 170 MG: 10 INJECTION, EMULSION INTRAVENOUS at 09:15

## 2020-07-21 RX ADMIN — LIDOCAINE HYDROCHLORIDE 30 MG: 10 INJECTION, SOLUTION INFILTRATION; PERINEURAL at 09:15

## 2020-07-21 RX ADMIN — SODIUM CHLORIDE: 9 INJECTION, SOLUTION INTRAVENOUS at 08:21

## 2020-07-21 NOTE — H&P
Patient Name: Kate Kendrick  : 1975  MRN: [id number]  DATE: 20    Allergies: Allergies   Allergen Reactions    Percocet [Oxycodone-Acetaminophen] Itching     \"felt weird\"        ENDOSCOPY  History and Physical    Procedure:    [] Diagnostic Colonoscopy       [x] Screening Colonoscopy  [] EGD      [] ERCP      [] EUS       [] Other    [x] Previous office notes/History and Physical reviewed from the patients chart. Please see EMR for further details of HPI. I have examined the patient's status immediately prior to the procedure and:      Indications/HPI:  Hx of polyps and family hx of colon polyps (dad) and colon cancer (paternal uncle)    []Abdominal Pain   []Cancer- GI/Lung     []Fhx of colon CA/polyps  []History of Polyps  []Barretts            []Melena  []Abnormal Imaging              []Dysphagia              []Persistent Pneumonia   []Anemia                            []Food Impaction        []History of Polyps  [] GI Bleed             []Pulmonary nodule/Mass   []Change in bowel habits []Heartburn/Reflux  []Rectal Bleed (BRBPR)  []Chest Pain - Non Cardiac []Heme (+) Stool []Ulcers  []Constipation  []Hemoptysis  []Varices  []Diarrhea  []Hypoxemia    []Nausea/Vomiting   []Screening   []Crohns/Colitis  []Other:     Anesthesia:   [x] MAC [] Moderate Sedation   [] General   [] None     ROS: 12 pt Review of Symptoms was negative unless mentioned above    Medications:   Prior to Admission medications    Medication Sig Start Date End Date Taking? Authorizing Provider   sertraline (ZOLOFT) 50 MG tablet Take 50 mg by mouth daily Pt cute medication in half.    Yes Historical Provider, MD   busPIRone (BUSPAR) 5 MG tablet Take 5 mg by mouth 3 times daily   Yes Historical Provider, MD   tamoxifen (NOLVADEX) 20 MG tablet Take 1 tablet by mouth daily 10/10/19  Yes JALEEL Meyer       Past Medical History:  Past Medical History:   Diagnosis Date    Anemia     BRCA negative 2017    My risk    Breast

## 2020-07-21 NOTE — ANESTHESIA POSTPROCEDURE EVALUATION
Department of Anesthesiology  Postprocedure Note    Patient: Josseline Balderas  MRN: [id number]  YOB: 1975  Date of evaluation: 7/21/2020  Time:  9:27 AM     Procedure Summary     Date:  07/21/20 Room / Location:  [facility] ENDO 02 / [address]    Anesthesia Start:  4352 Anesthesia Stop:      Procedure:  COLORECTAL CANCER SCREENING, NOT HIGH RISK (N/A Abdomen) Diagnosis:  (SCREENING/ FAM HX CLN CA/ POLYPS/ HX CLN POLYPS)    Surgeon:  Rajan Coombs MD Responsible Provider: JALEEL Thakur CRNA    Anesthesia Type:  general ASA Status:  1          Anesthesia Type: general    Francis Phase I:      Francis Phase II:      Last vitals: Reviewed and per EMR flowsheets.        Anesthesia Post Evaluation    Patient location during evaluation: bedside  Patient participation: complete - patient participated  Level of consciousness: sleepy but conscious  Pain score: 0  Airway patency: patent  Nausea & Vomiting: no nausea and no vomiting  Complications: no  Cardiovascular status: blood pressure returned to baseline  Respiratory status: acceptable  Hydration status: euvolemic

## 2020-07-21 NOTE — ANESTHESIA PRE PROCEDURE
Department of Anesthesiology  Preprocedure Note       Name:  Keisha Alfred   Age:  39 y.o.  :  1975                                          MRN:  [id number]         Date:  2020      Surgeon: Abhijit Dietz):  Rene Bruno MD    Procedure: Procedure(s): HYSTERECTOMY VAGINAL LAPAROSCOPIC ASSISTED (LAVH)  SALPINGO OOPHORECTOMY LAPAROSCOPIC    Medications prior to admission:   Prior to Admission medications    Medication Sig Start Date End Date Taking? Authorizing Provider   sertraline (ZOLOFT) 50 MG tablet Take 50 mg by mouth daily Pt cute medication in half. Historical Provider, MD   busPIRone (BUSPAR) 5 MG tablet Take 5 mg by mouth 3 times daily    Historical Provider, MD   tamoxifen (NOLVADEX) 20 MG tablet Take 1 tablet by mouth daily 10/10/19   JALEEL Gavin       Current medications:    No current facility-administered medications for this visit. No current outpatient medications on file. Facility-Administered Medications Ordered in Other Visits   Medication Dose Route Frequency Provider Last Rate Last Dose    0.9 % sodium chloride infusion   Intravenous Continuous Rene Bruno MD           Allergies:     Allergies   Allergen Reactions    Percocet [Oxycodone-Acetaminophen] Itching     \"felt weird\"       Problem List:    Patient Active Problem List   Diagnosis Code    Cancer of left breast ([facility] [address] 12 o'clock 3.1 cm C50.912    S/P bilateral mastectomy, skin and nipple sparin 14 Z90.13    Rectal bleeding K62.5    Use of tamoxifen (Nolvadex) Z79.810    Family history of uterine cancer Z80.49    Family history of colon cancer Z80.0    BRCA negative Z13.71    Atypical squamous cell changes of undetermined significance (ASCUS) on cervical cytology with positive high risk human papilloma virus (HPV) R87.610, R87.810    H/O cervical biopsy Z98.890    Hot flashes due to tamoxifen R23.2, T45.1X5A    History of colonic polyps Z86.010    Iron deficiency anemia D50.9  Infiltrating lobular carcinoma of breast, stage 1, left ([facility]) C50.912    Encounter for monitoring tamoxifen therapy Z51.81, Z79.810       Past Medical History:        Diagnosis Date    Anemia     BRCA negative 2017    My risk    Breast cancer ([facility]) 2014    Chemo-Hormone fed tumor (Dr. Nathan Peerz)    Genetic testing of female     BRCCA Negative    H/O screening mammography     @ Bilateral Mastectomy     PONV (postoperative nausea and vomiting)        Past Surgical History:        Procedure Laterality Date    BREAST BIOPSY Left 14    mammotome     BREAST SURGERY  14    bilateral mastectomy on 14    BREAST SURGERY  14    bilateral tissue expanders placed    BREAST SURGERY      Fat Graphing     SECTION      COLONOSCOPY  2015    COLONOSCOPY  2015    @ Dr. Mitzi Baez  2016    done for bleeding, report is benign    HYSTERECTOMY VAGINAL N/A 2017    HYSTERECTOMY VAGINAL LAPAROSCOPIC ASSISTED (LAVH) performed by Olga Oviedo MD at [address] Left 14    axilla    SALPINGO-OOPHORECTOMY Bilateral 2017    SALPINGO OOPHORECTOMY LAPAROSCOPIC performed by Olga Oviedo MD at [address] TUNNELED VENOUS PORT PLACEMENT  14    WISDOM TOOTH EXTRACTION         Social History:    Social History     Tobacco Use    Smoking status: Former Smoker     Types: Cigarettes     Last attempt to quit: 2008     Years since quittin.2    Smokeless tobacco: Never Used   Substance Use Topics    Alcohol use: Yes     Alcohol/week: 0.0 standard drinks     Comment: OCC                                Counseling given: Not Answered      Vital Signs (Current): There were no vitals filed for this visit.                                            BP Readings from Last 3 Encounters:   20 (!) 142/80   07/10/19 128/78   07/10/19 128/78       NPO Status: BMI:   Wt Readings from Last 3 Encounters:   07/10/19 137 lb (62.1 kg)   07/10/19 137 lb (62.1 kg)   10/22/19 137 lb (62.1 kg)     There is no height or weight on file to calculate BMI.    CBC:   Lab Results   Component Value Date    WBC 7.07 05/28/2020    RBC 3.94 05/28/2020    HGB 11.7 05/28/2020    HCT 36.1 05/28/2020    MCV 91.6 05/28/2020    RDW 12.3 05/28/2020     05/28/2020       CMP:   Lab Results   Component Value Date     05/28/2020    K 4.2 05/28/2020     05/28/2020    CO2 25 05/28/2020    BUN 10 05/28/2020    CREATININE 0.73 05/28/2020    GFRAA 117 05/28/2020    AGRATIO 2.1 05/28/2020    LABGLOM 101 05/28/2020    GLUCOSE 87 05/28/2020    PROT 7.1 05/28/2020    CALCIUM 9.7 05/28/2020    BILITOT 0.6 05/28/2020    ALKPHOS 37 05/28/2020    AST 21 05/28/2020    ALT 16 05/28/2020       POC Tests: No results for input(s): POCGLU, POCNA, POCK, POCCL, POCBUN, POCHEMO, POCHCT in the last 72 hours. Coags:   Lab Results   Component Value Date    PROTIME 12.8 09/02/2015    INR 0.99 09/02/2015       HCG (If Applicable):   Lab Results   Component Value Date    PREGTESTUR Negative 05/19/2017        ABGs: No results found for: PHART, PO2ART, UKL6FKD, YTO3UKU, BEART, Q1DPZBUP     Type & Screen (If Applicable):  No results found for: LABABO, [address]    Anesthesia Evaluation  Patient summary reviewed and Nursing notes reviewed   history of anesthetic complications: PONV. Airway: Mallampati: II  TM distance: >3 FB   Neck ROM: full  Mouth opening: > = 3 FB Dental: normal exam         Pulmonary:Negative Pulmonary ROS and normal exam  breath sounds clear to auscultation                             Cardiovascular:Negative CV ROS            Rhythm: regular  Rate: normal                    Neuro/Psych:   Negative Neuro/Psych ROS              GI/Hepatic/Renal: Neg GI/Hepatic/Renal ROS            Endo/Other: Negative Endo/Other ROS                    Abdominal:       Abdomen: soft. Vascular: negative vascular ROS. Anesthesia Plan      general     ASA 1     ( )  Induction: intravenous. Anesthetic plan and risks discussed with patient. Plan discussed with CRNA.                   JALEEL Cuadra - CRNA   7/21/2020

## 2020-07-21 NOTE — OP NOTE
Patient: Ivelisse Coburn : 1975  Med Rec#: [id number] Acc#: [id number]   Primary Care Provider Asim Lucio MD, MD    Date of Procedure:  2020    Endoscopist: Saundra Colunga MD    Referring Provider: Asim Lucio MD, MD,     Operation Performed: Colonoscopy up to the terminal ileum    Indications: Hx of polyps and family hx of colon polyps (dad) and colon cancer (paternal uncle)    Anesthesia:  Sedation was administered by anesthesia who monitored the patient during the procedure. I met with Ivelisse Coburn prior to procedure. We discussed the procedure itself, and I have discussed the risks of endoscopy (including-- but not limited to-- pain, discomfort, bleeding potentially requiring second endoscopic procedure and/or blood transfusion, organ perforation requiring operative repair, damage to organs near the colon, infection, aspiration, cardiopulmonary/allergic reaction), benefits, indications to endoscopy. Additionally, we discussed options other than colonoscopy. The patient expressed understanding. All questions answered. The patient decided to proceed with the procedure. Signed informed consent was placed on the chart. Blood Loss: minimal    Withdrawal time: >6 mins  Bowel Prep: Fair with thick solid and semisolid stool scattered in segments throughout the colon obscuring the underlying mucosa. Small lesions including polyps may have been missed. Complications: no immediate complications    DESCRIPTION OF PROCEDURE:     A time out was performed. After written informed consent was obtained, the patient was placed in the left lateral position. The perianal area was inspected, and a digital rectal exam was performed. A rectal exam was performed: normal tone, no palpable lesions. At this point, a forward viewing Olympus colonoscope was inserted into the anus and carefully advanced to the terminal ileum.   The cecum was identified by the ileocecal valve and the appendiceal orifice. The colonoscope was then slowly withdrawn with careful inspection of the mucosa in a linear and circumferential fashion. The scope was retroflexed in the rectum. Suction was utilized during the procedure to remove as much air as possible from the bowel. The colonoscope was removed from the patient, and the procedure was terminated. Findings are listed below. Findings:     NO large polyps or masses or strictures or colitis. Suboptimal exam due to prep quality as described above. Moderate Pan-Diverticulosis in the colon  Internal hemorrhoids-Grade 1  Where it was clearly visible, the mucosa appeared normal throughout the entire examined colon  Retroflexion in the rectum was otherwise normal and revealed no further abnormalities. Recommendations:  1. Repeat colonoscopy: due to her personal and family hx and her prep quality today-for CRCS in 5 years. 2.- Resume previous meds and diet  - GI clinic f/u PRN   - Keep scheduled f/u appts with other MDs     Findings and recommendations were discussed w/ the patient. A copy of the images was provided.     Rigo Soto MD  7/21/2020  9:19 AM

## 2020-10-22 ENCOUNTER — OFFICE VISIT (OUTPATIENT)
Dept: SURGERY | Age: 45
End: 2020-10-22
Payer: COMMERCIAL

## 2020-10-22 VITALS
DIASTOLIC BLOOD PRESSURE: 85 MMHG | SYSTOLIC BLOOD PRESSURE: 130 MMHG | BODY MASS INDEX: 23.73 KG/M2 | TEMPERATURE: 98.4 F | HEART RATE: 73 BPM | HEIGHT: 64 IN | WEIGHT: 139 LBS

## 2020-10-22 PROCEDURE — 99213 OFFICE O/P EST LOW 20 MIN: CPT | Performed by: PHYSICIAN ASSISTANT

## 2020-10-22 NOTE — PROGRESS NOTES
HISTORY OF PRESENT ILLNESS:     Brittaney Beauchamp is a 39 y.o.  female who is here for a breast exam.   She is s/p bilateral skin and nipple sparing bilateral mastectomy on 8/26/14 for a 3.1 cm low grade ER positive and HER 2 negative invasive lobular carcinoma on the left. Ki67 is 19%. She underwent neoadjuvent chemotherapy with dramatic response. There was no gross tumor. There was residual microscopic disease over a 2.5 cm area. S/p breast reconstruction with Dr. Eliane Yin. She has been having lower back pain and abdominal bloating for the last few weeks. The back pain does not radiate. She has noted no changes in her bowel habits, nausea, vomiting, or changes in appetite.     PHYSICAL EXAM:  The bilateral mastectomy and reconstruction incisions are looking good. There are appropriate post operative changes. There are no dominant masses, no skin or nipple changes, and no axillary adenopathy. There is nothing suspicious for new carcinoma and no evidence of local tumor recurrence.      IMPRESSION:    Personal history of breast cancer  Back pain  Abdominal bloating    PLAN:   I have recommended she have a bone scan and CT abdomen and pelvis for her back pain and abdominal bloating. She will call for results. I will see her back in 1 year for a physical exam. She will contact us if anything changes. 15 minutes spent, which includes face to face with patient, record review, evaluation, planning, and education.    I spent over 50% of this visit counseling patient.

## 2020-10-28 ENCOUNTER — HOSPITAL ENCOUNTER (OUTPATIENT)
Dept: NUCLEAR MEDICINE | Age: 45
Discharge: HOME | End: 2020-10-30
Payer: COMMERCIAL

## 2020-10-28 ENCOUNTER — HOSPITAL ENCOUNTER (OUTPATIENT)
Dept: CT IMAGING | Age: 45
Discharge: HOME | End: 2020-10-28
Payer: COMMERCIAL

## 2020-10-28 PROCEDURE — A9561 TC99M OXIDRONATE: HCPCS | Performed by: PHYSICIAN ASSISTANT

## 2020-10-28 PROCEDURE — 3430000000 HC RX DIAGNOSTIC RADIOPHARMACEUTICAL: Performed by: PHYSICIAN ASSISTANT

## 2020-10-28 PROCEDURE — 78306 BONE IMAGING WHOLE BODY: CPT

## 2020-10-28 PROCEDURE — 74177 CT ABD & PELVIS W/CONTRAST: CPT

## 2020-10-28 PROCEDURE — 6360000004 HC RX CONTRAST MEDICATION: Performed by: PHYSICIAN ASSISTANT

## 2020-10-28 RX ADMIN — TECHNETIUM TC 99M OXIDRONATE 21.2 MILLICURIE: 3.15 INJECTION, POWDER, LYOPHILIZED, FOR SOLUTION INTRAVENOUS at 14:04

## 2020-10-28 RX ADMIN — IOPAMIDOL 90 ML: 755 INJECTION, SOLUTION INTRAVENOUS at 10:17

## 2020-12-16 ENCOUNTER — HOSPITAL ENCOUNTER (OUTPATIENT)
Dept: INFUSION THERAPY | Age: 45
End: 2020-12-16
Payer: COMMERCIAL

## 2020-12-21 RX ORDER — TAMOXIFEN CITRATE 20 MG/1
TABLET ORAL
Qty: 90 TABLET | Refills: 3 | Status: SHIPPED | OUTPATIENT
Start: 2020-12-21 | End: 2021-12-27

## 2021-02-02 ENCOUNTER — HOSPITAL ENCOUNTER (OUTPATIENT)
Dept: INFUSION THERAPY | Age: 46
End: 2021-02-02
Payer: COMMERCIAL

## 2021-05-05 ENCOUNTER — TELEPHONE (OUTPATIENT)
Dept: SURGERY | Age: 46
End: 2021-05-05

## 2021-05-05 ENCOUNTER — OFFICE VISIT (OUTPATIENT)
Dept: HEMATOLOGY | Age: 46
End: 2021-05-05
Payer: COMMERCIAL

## 2021-05-05 ENCOUNTER — HOSPITAL ENCOUNTER (OUTPATIENT)
Dept: INFUSION THERAPY | Age: 46
Discharge: HOME | End: 2021-05-05
Payer: COMMERCIAL

## 2021-05-05 VITALS
WEIGHT: 142.1 LBS | SYSTOLIC BLOOD PRESSURE: 126 MMHG | DIASTOLIC BLOOD PRESSURE: 76 MMHG | TEMPERATURE: 97.5 F | HEIGHT: 64 IN | HEART RATE: 88 BPM | OXYGEN SATURATION: 99 % | BODY MASS INDEX: 24.26 KG/M2

## 2021-05-05 DIAGNOSIS — C50.912 MALIGNANT NEOPLASM OF LEFT FEMALE BREAST, UNSPECIFIED ESTROGEN RECEPTOR STATUS, UNSPECIFIED SITE OF BREAST (HCC): ICD-10-CM

## 2021-05-05 DIAGNOSIS — D50.9 IRON DEFICIENCY ANEMIA, UNSPECIFIED IRON DEFICIENCY ANEMIA TYPE: ICD-10-CM

## 2021-05-05 DIAGNOSIS — Z79.810 ENCOUNTER FOR MONITORING TAMOXIFEN THERAPY: ICD-10-CM

## 2021-05-05 DIAGNOSIS — Z86.010 HISTORY OF COLONIC POLYPS: ICD-10-CM

## 2021-05-05 DIAGNOSIS — Z51.81 ENCOUNTER FOR MONITORING TAMOXIFEN THERAPY: ICD-10-CM

## 2021-05-05 DIAGNOSIS — C50.912: Primary | ICD-10-CM

## 2021-05-05 LAB
BASOPHILS ABSOLUTE: 0.01 K/UL (ref 0.01–0.08)
BASOPHILS RELATIVE PERCENT: 0.2 % (ref 0.1–1.2)
EOSINOPHILS ABSOLUTE: 0.05 K/UL (ref 0.04–0.54)
EOSINOPHILS RELATIVE PERCENT: 0.8 % (ref 0.7–7)
HCT VFR BLD CALC: 38.4 % (ref 34.1–44.9)
HEMOGLOBIN: 12.2 G/DL (ref 11.2–15.7)
LYMPHOCYTES ABSOLUTE: 2.2 K/UL (ref 1.18–3.74)
LYMPHOCYTES RELATIVE PERCENT: 33.4 % (ref 19.3–53.1)
MCH RBC QN AUTO: 28.8 PG (ref 25.6–32.2)
MCHC RBC AUTO-ENTMCNC: 31.8 G/DL (ref 32.3–35.5)
MCV RBC AUTO: 90.6 FL (ref 79.4–94.8)
MONOCYTES ABSOLUTE: 0.53 K/UL (ref 0.24–0.82)
MONOCYTES RELATIVE PERCENT: 8 % (ref 4.7–12.5)
NEUTROPHILS ABSOLUTE: 3.8 K/UL (ref 1.56–6.13)
NEUTROPHILS RELATIVE PERCENT: 57.6 % (ref 34–71.1)
PDW BLD-RTO: 11.9 % (ref 11.7–14.4)
PLATELET # BLD: 196 K/UL (ref 182–369)
PMV BLD AUTO: 9.7 FL (ref 7.4–10.4)
RBC # BLD: 4.24 M/UL (ref 3.93–5.22)
WBC # BLD: 6.59 K/UL (ref 3.98–10.04)

## 2021-05-05 PROCEDURE — 99211 OFF/OP EST MAY X REQ PHY/QHP: CPT

## 2021-05-05 PROCEDURE — 99214 OFFICE O/P EST MOD 30 MIN: CPT | Performed by: NURSE PRACTITIONER

## 2021-05-05 PROCEDURE — 85025 COMPLETE CBC W/AUTO DIFF WBC: CPT

## 2021-05-05 ASSESSMENT — ENCOUNTER SYMPTOMS
GASTROINTESTINAL NEGATIVE: 1
SORE THROAT: 0
NAUSEA: 0
SHORTNESS OF BREATH: 0
COUGH: 0
EYES NEGATIVE: 1
EYE REDNESS: 0
ABDOMINAL PAIN: 0
WHEEZING: 0
EYE PAIN: 0
BLOOD IN STOOL: 0
CONSTIPATION: 0
BACK PAIN: 0
EYE DISCHARGE: 0
RESPIRATORY NEGATIVE: 1
VOMITING: 0
DIARRHEA: 0

## 2021-05-05 NOTE — PROGRESS NOTES
pain and abdominal bloating. 10/28/2020 CT Abdomen/pelvis with contrast documented no acute abnormality seen. 10/28/2020 Bone Scan documented ill-defined foci of increased activity involving the shoulder joint, right sternoclavicular joint and posterior sacroiliac joints bilaterally. Documented as chronic degenerative changes from a previous trauma. No evidence of bony metastatic disease. [facility] has a history of colon polyps and her last colonoscopy was completed on 7/21/2020 by Dr. Emy Jeffers. Colonoscopy report indicated no large polyps or masses. Suboptimal exam due to prep quality. Moderate pan diverticulosis in the colon and internal hemorrhoids, grade 1. A 5-year colonoscopy recall was recommended. She continues to deny any GI complaints. ONCOLOGIC HISTORY:   Diagnosis:   · Infiltrating lobular carcinoma, grade 1, 1/10/2014   · Stage IIA, [id number]   · Tumor measured 2.5 cm   · ER 97%, AR 98%, and HER-2/greg 1+. Ki-67 19%, p53 9%   · BRCA 1 and 2 negative   · History of Iron deficiency anemia     Treatment summary:  · 2/17/2014- Neoadjuvant chemotherapy with dose dense Adriamycin and Cytoxan ×4 cycles followed by 12 weekly courses of Taxol. · 8/26/2014 Bilateral mastectomies with reconstruction   · September 2014-10/2/2017 - Adjuvant hormonal manipulation therapy with tamoxifen, discontinued after total hysterectomy   · 10/2/2017-1/10/2019 Arimidex, discontinued due to significant arthralgias and migraines. · 1/10/2019- Resumed tamoxifen   · History of iron replacement with Ferrous sulfate 325 mg daily       Ms. Cedric Lopez was seen in initial oncology consultation to establish care for a known history of stage IIA infiltrating lobular right breast carcinoma on 7/10/2019. [facility] was referred from [place], [place].  [facility] was  taking adjuvant endocrine therapy with tamoxifen, initiated in September 2014 and anticipate continuing with dosing until September 2024.  Roniblade Jacksonter was diagnosed tamoxifen   · May 2017- Total abdominal hysterectomy   · 10/2/2017- Discontinued tamoxifen and initiated Arimidex   · 1/10/2019- Discontinued Arimidex due to significant arthralgias and migraines. Resumed tamoxifen     Bilateral reconstructed breasts with no evidence of recurrent disease. Bilateral cervical, subclavicular and axillary areas without palpable lymphadenopathy on 7/10/2019      History of iron deficiency anemia:  Dwaine Crowell has a known history of iron deficiency anemia and iron substrates were last obtained on 1/10/2019 that were within normal limits as documented above.      CBC on 7/10/2019 documented a WBC of 7.06, ANC 4.61, hemoglobin 13.1, MCV 91.9 and a platelet count of 291,355. No need for further evaluation or workup at this time. Serology studies 5/28/2020  Ferritin 107  B-99=614  Reticulocytes  1.2     10/28/2020 CT Abdomen/pelvis with contrast documented no acute abnormality seen  10/28/2020 Bone Scan documented there are ill-defined foci of increased activity involving the shoulder joint, right sternoclavicular joint and posterior sacroiliac joints bilaterally. This represents chronic degenerative changes from a previous trauma. No evidence of bony metastatic disease. Age-appropriate health screening:   · Bone mineral density study on 10/3/2019 was documented as normal  · 7/21/2020 Colonoscopy at [address] per Dr Chandler Player documented NO large polyps or masses or strictures or colitis. Suboptimal exam due to prep quality. Moderate Pan-Diverticulosis in the colon. Internal hemorrhoids-Grade 1. Where it was clearly visible, the mucosa appeared normal throughout the entire examined colon  Retroflexion in the rectum was otherwise normal and revealed no further abnormalities. 5-year recall.       Past Medical History:    Past Medical History:   Diagnosis Date    Anemia     BRCA negative 03/2017    My risk    Breast cancer ([address] 01/24/2014    Chemo-Hormone fed tumor (Dr. Hurman Duverney)   [facility] testing of female 200    BRCCA Negative    H/O screening mammography     @ Bilateral Mastectomy     PONV (postoperative nausea and vomiting)        Past Surgical History:    Past Surgical History:   Procedure Laterality Date    BREAST BIOPSY Left 16    mammotome     BREAST SURGERY Bilateral 2014    tissue expanders placed    BREAST SURGERY      Fat Graphing     SECTION      COLONOSCOPY  2015    COLONOSCOPY  2015    @ Dr. Caio Lockett N/A 2020    Dr Desmond Martin prep, pandiverticulosis, internal hemorrhoids-Grade 1, 5 yr recall    ENDOMETRIAL BIOPSY  2016    done for bleeding, report is benign    HYSTERECTOMY VAGINAL N/A 2017    HYSTERECTOMY VAGINAL LAPAROSCOPIC ASSISTED (LAVH) performed by Galdino Ayoub MD at [address] Left 14    axilla    MASTECTOMY, BILATERAL  2014    SALPINGO-OOPHORECTOMY Bilateral 2017    SALPINGO OOPHORECTOMY LAPAROSCOPIC performed by Galdino Ayoub MD at [address] [facility] TUNNELED VENOUS PORT PLACEMENT  14    WISDOM TOOTH EXTRACTION         Current Medications:    Current Outpatient Medications   Medication Sig Dispense Refill    Multiple Vitamins-Minerals (IMMUNE SYSTEM BOOSTER PO) Take by mouth      tamoxifen (NOLVADEX) 20 MG tablet TAKE 1 TABLET BY MOUTH EVERY DAY 90 tablet 3    sertraline (ZOLOFT) 50 MG tablet Take 50 mg by mouth daily Pt cute medication in half.  busPIRone (BUSPAR) 5 MG tablet Take 5 mg by mouth 3 times daily       No current facility-administered medications for this visit. Allergies:    Allergies   Allergen Reactions    Oxycodone-Acetaminophen      Other reaction(s): Unknown    Percocet [Oxycodone-Acetaminophen] Itching     \"felt weird\"       Social History:    Social History     Tobacco Use    Smoking status: Former Smoker     Types: Cigarettes     Quit date: 2008     Years since quittin.0    Smokeless tobacco: Never Used   Substance Use Topics    Alcohol use: Yes     Alcohol/week: 0.0 standard drinks     Comment: OCC    Drug use: No       Family History:   Family History   Problem Relation Age of Onset    Colon Cancer Paternal Uncle 61    Colon Polyps Paternal Uncle     Breast Cancer Maternal Aunt 79    High Blood Pressure Mother     High Cholesterol Father     High Blood Pressure Father     Heart Disease Paternal Grandfather     Uterine Cancer Maternal Grandmother 39    Esophageal Cancer Neg Hx     Liver Cancer Neg Hx     Liver Disease Neg Hx     Rectal Cancer Neg Hx     Stomach Cancer Neg Hx        Vitals:  Vitals:    05/05/21 1348   BP: 126/76   Pulse: 88   Temp: 97.5 °F (36.4 °C)   SpO2: 99%   Weight: 142 lb 1.6 oz (64.5 kg)   Height: 5' 4\" (1.626 m)        Subjective   REVIEW OF SYSTEMS:   Review of Systems   Constitutional: Negative. Negative for chills, diaphoresis and fever. HENT: Negative. Negative for congestion, ear pain, hearing loss, nosebleeds, sore throat and tinnitus. Eyes: Negative. Negative for pain, discharge and redness. Respiratory: Negative. Negative for cough, shortness of breath and wheezing. Cardiovascular: Negative. Negative for chest pain, palpitations and leg swelling. Gastrointestinal: Negative. Negative for abdominal pain, blood in stool, constipation, diarrhea, nausea and vomiting. Endocrine: Positive for heat intolerance (Hot flashes currently tolerable). Negative for polydipsia. Genitourinary: Positive for vaginal pain (Secondary to severe dryness occasional bleeding with wiping). Negative for dysuria, flank pain, frequency, hematuria and urgency. Musculoskeletal: Negative. Negative for back pain, myalgias and neck pain. Skin: Negative. Negative for rash. Neurological: Negative. Negative for dizziness, tremors, seizures, weakness and headaches. Hematological: Does not bruise/bleed easily. Psychiatric/Behavioral: Negative.   The patient is not nervous/anxious. Objective   PHYSICAL EXAM:  Physical Exam  Vitals signs reviewed. Constitutional:       General: She is not in acute distress. Appearance: She is well-developed. She is not diaphoretic. HENT:      Head: Normocephalic and atraumatic. Mouth/Throat:      Pharynx: Uvula midline. Tonsils: No tonsillar exudate. Eyes:      General: Lids are normal. No scleral icterus. Right eye: No discharge. Left eye: No discharge. Conjunctiva/sclera: Conjunctivae normal.      Pupils: Pupils are equal, round, and reactive to light. Neck:      Musculoskeletal: Normal range of motion and neck supple. Thyroid: No thyroid mass or thyromegaly. Vascular: No JVD. Trachea: Trachea normal. No tracheal deviation. Cardiovascular:      Rate and Rhythm: Normal rate and regular rhythm. Heart sounds: Normal heart sounds. No murmur. No friction rub. No gallop. Pulmonary:      Effort: Pulmonary effort is normal. No respiratory distress. Breath sounds: Normal breath sounds. No wheezing or rales. Chest:      Chest wall: No tenderness. Comments: Bilateral reconstructed breast, left breast with shift and large indention/surgical scar  Abdominal:      General: Bowel sounds are normal. There is no distension. Palpations: Abdomen is soft. There is no mass. Tenderness: There is no abdominal tenderness. There is no guarding or rebound. Hernia: No hernia is present. Musculoskeletal:         General: No tenderness or deformity. Comments: Range of motion within normal limits x4 extremities   Lymphadenopathy:      Cervical: No cervical adenopathy. Right cervical: No superficial cervical adenopathy. Left cervical: No superficial cervical adenopathy. Upper Body:      Right upper body: No supraclavicular adenopathy. Left upper body: No supraclavicular adenopathy. Skin:     General: Skin is warm.       Coloration: Skin is not pale.      Findings: No erythema or rash. Neurological:      Mental Status: She is alert and oriented to person, place, and time. Cranial Nerves: No cranial nerve deficit. Coordination: Coordination normal.   Psychiatric:         Behavior: Behavior normal.         Thought Content: Thought content normal.         Labs:  Lab Results   Component Value Date    WBC 6.59 05/05/2021    HGB 12.2 05/05/2021    HCT 38.4 05/05/2021    MCV 90.6 05/05/2021     05/05/2021     Lab Results   Component Value Date    NEUTROABS 3.80 05/05/2021     Lab Results   Component Value Date     05/28/2020    K 4.2 05/28/2020     05/28/2020    CO2 25 05/28/2020    BUN 10 05/28/2020    CREATININE 0.73 05/28/2020    GLUCOSE 87 05/28/2020    CALCIUM 9.7 05/28/2020    PROT 7.1 05/28/2020    LABALBU 4.8 05/28/2020    BILITOT 0.6 05/28/2020    ALKPHOS 37 (L) 05/28/2020    AST 21 05/28/2020    ALT 16 05/28/2020    LABGLOM 101 05/28/2020    GFRAA 117 05/28/2020    AGRATIO 2.1 05/28/2020    GLOB 2.3 05/28/2020         ASSESSMENT/PLAN:      1. Infiltrating lobular carcinoma of left breast, stage 1, ER and OH positive. Intolerant to Arimidex due to significant arthralgias and migraines. Continues to tolerate adjuvant endocrine therapy with tamoxifen,anticipate dosing until September 2024. Complains of left breast shifting and concern for increased left breast indentation. Bilateral breast exam today revealed no axillary adenopathy. Bilateral reconstructed breast without palpable masses. Left breast appears to be shifting outward and patient reports known indention appears deeper. I reviewed the following at today's office visit:  \[facility] [place] [facility]\"" was last seen by CYNDI Funk on 10/22/2020. I reviewed her progress note that indicated appropriate post-operative changes. A bone scan and CT abdomen and pelvis was ordered for her back pain and abdominal bloating.      10/28/2020 CT Abdomen/pelvis with contrast documented no acute abnormality seen. 10/28/2020 Bone Scan documented ill-defined foci of increased activity involving the shoulder joint, right sternoclavicular joint and posterior sacroiliac joints bilaterally. Documented as chronic degenerative changes from a previous trauma. No evidence of bony metastatic disease.     -I reached out to CYNDI Thakkar for further recommendations related to concern for increased left breast indention  -Continue tamoxifen    2. History of colonic polyps, colonoscopy was completed on 7/21/2020 by Dr. Elsi Alamo. Colonoscopy report indicated no large polyps or masses. Suboptimal exam due to prep quality. Moderate pan diverticulosis in the colon and internal hemorrhoids, grade 1. A 5-year colonoscopy recall was recommended. She continues to deny any GI complaints. 3. Iron deficiency anemia, hemoglobin 12.2 with MCV of 90.6. Currently not taking oral iron supplement  Serology studies 5/28/2020  Ferritin 107  B-42=919  Reticulocytes  1.2     -No intervention warranted at this time continue with monitoring conservatively    4. Encounter for monitoring tamoxifen therapy  -Hot flashes secondary to tamoxifen-reports is tolerable  -Vaginal dryness with occasional bleeding with wiping: Prescription faxed to [address] [place] [address] Vel Saavedra for oxytocin cream    I discussed all of the above findings included in the assessment and plan with the patient and the patient is in agreement to move forward with current recommendations/treatment. I have addressed all of their questions and concerns that were verbalized. FOLLOW UP:  1. Follow up in 6 months, sooner if needed  2. Continue to follow with other medical providers as recommended    Discussed precautions related to [address] and being at increased risk. Discussed proper handwashing to be done frequently, limit exposure to other individuals and maintain social distancing of 6 feet.   Recommend contacting primary care provider if having respiratory symptoms for further recommendations and consideration for testing. (Please note that portions of this note were completed with a voice recognition program. Efforts were made to edit the dictations but occasionally words are mis-transcribed,  Also, portions of this note have been copied forward, however, changed to reflect the most current clinical status of this patient.)    Ariane AQUINO am scribing for JALEEL Stephens. Electronically signed by Ariane Tabor RN on 5/5/2021 at [phone]. Jeovany AQUINO APRN personally performed the services described in this documentation as scribed by Ariane Tabor RN in my presence and is both accurate and complete.       Electronically signed by JALEEL Stephens on 5/10/2021 at 10:35 AM

## 2021-08-24 PROCEDURE — U0004 COV-19 TEST NON-CDC HGH THRU: HCPCS | Performed by: FAMILY MEDICINE

## 2021-10-21 ENCOUNTER — OFFICE VISIT (OUTPATIENT)
Dept: SURGERY | Age: 46
End: 2021-10-21
Payer: COMMERCIAL

## 2021-10-21 VITALS
SYSTOLIC BLOOD PRESSURE: 122 MMHG | HEART RATE: 74 BPM | HEIGHT: 64 IN | WEIGHT: 143 LBS | BODY MASS INDEX: 24.41 KG/M2 | DIASTOLIC BLOOD PRESSURE: 76 MMHG | TEMPERATURE: 98.4 F

## 2021-10-21 DIAGNOSIS — Z85.3 PERSONAL HISTORY OF MALIGNANT NEOPLASM OF BREAST: Primary | ICD-10-CM

## 2021-10-21 DIAGNOSIS — N64.59 INVERSION OF LEFT NIPPLE: ICD-10-CM

## 2021-10-21 PROCEDURE — 99213 OFFICE O/P EST LOW 20 MIN: CPT | Performed by: PHYSICIAN ASSISTANT

## 2021-10-21 NOTE — PROGRESS NOTES
HISTORY OF PRESENT ILLNESS:     Angi Broderick is a 42 y.o.  female who is here for a breast exam.   She is s/p bilateral skin and nipple sparing bilateral mastectomy on 8/26/14 for a 3.1 cm low grade ER positive and HER 2 negative invasive lobular carcinoma on the left. Ki67 is 19%. She underwent neoadjuvent chemotherapy with dramatic response. There was no gross tumor. There was residual microscopic disease over a 2.5 cm area. S/p breast reconstruction with Dr. Siobhan Garza. She has noted an indentation in her left nipple. She states it has been like that since surgery, but it is now deeper and more noticeable.       PHYSICAL EXAM:  The bilateral mastectomy and reconstruction incisions are well healed. There are appropriate post operative changes. There are no dominant masses, no skin changes and no axillary adenopathy. The has an indentation in the upper inner quadrant of the NAC    IMPRESSION:    Personal history of breast cancer  Back pain  Abdominal bloating    PLAN:   I have recommended she have a breast MRI to further evaluate the indentation in her left nipple. We will call her with results.   If it is normal I will plan to see her in 1 year for exam.      20 minutes spent, which includes face to face with patient, record review, evaluation, planning, and education.

## 2021-10-28 ENCOUNTER — HOSPITAL ENCOUNTER (OUTPATIENT)
Dept: MRI IMAGING | Age: 46
Discharge: HOME | End: 2021-10-28
Payer: COMMERCIAL

## 2021-10-28 DIAGNOSIS — N64.59 INVERSION OF LEFT NIPPLE: ICD-10-CM

## 2021-10-28 DIAGNOSIS — Z85.3 PERSONAL HISTORY OF MALIGNANT NEOPLASM OF BREAST: ICD-10-CM

## 2021-10-28 PROCEDURE — 77049 MRI BREAST C-+ W/CAD BI: CPT

## 2021-10-28 PROCEDURE — 6360000004 HC RX CONTRAST MEDICATION: Performed by: PHYSICIAN ASSISTANT

## 2021-10-28 PROCEDURE — A9577 INJ MULTIHANCE: HCPCS | Performed by: PHYSICIAN ASSISTANT

## 2021-10-28 RX ADMIN — GADOBENATE DIMEGLUMINE 13 ML: 529 INJECTION, SOLUTION INTRAVENOUS at 13:27

## 2021-12-27 DIAGNOSIS — C50.912: ICD-10-CM

## 2021-12-27 RX ORDER — TAMOXIFEN CITRATE 20 MG/1
TABLET ORAL
Qty: 90 TABLET | Refills: 3 | Status: SHIPPED | OUTPATIENT
Start: 2021-12-27

## 2022-01-11 DIAGNOSIS — C50.912: Primary | ICD-10-CM

## 2022-01-11 DIAGNOSIS — Z86.010 HISTORY OF COLONIC POLYPS: Primary | ICD-10-CM

## 2022-01-13 ENCOUNTER — NURSE TRIAGE (OUTPATIENT)
Dept: CALL CENTER | Facility: HOSPITAL | Age: 47
End: 2022-01-13

## 2022-01-14 NOTE — TELEPHONE ENCOUNTER
/100 during triage    Reason for Disposition  • Systolic BP  >= 160 OR Diastolic >= 100  • [1] Intermittent fever > 100.0 F (37.8 C) AND [2] lasts > 3 weeks    Additional Information  • Negative: Difficult to awaken or acting confused (e.g., disoriented, slurred speech)  • Negative: Severe difficulty breathing (e.g., struggling for each breath, speaks in single words)  • Negative: [1] Weakness of the face, arm or leg on one side of the body AND [2] new onset  • Negative: [1] Numbness (i.e., loss of sensation) of the face, arm or leg on one side of the body AND [2] new onset  • Negative: [1] Chest pain lasts > 5 minutes AND [2] history of heart disease  (i.e., heart attack, bypass surgery, angina, angioplasty, CHF)  • Negative: [1] Chest pain AND [2] took nitrogylcerin AND [3] pain was not relieved  • Negative: Sounds like a life-threatening emergency to the triager  • Negative: Symptom is main concern  (e.g., headache, chest pain)  • Negative: Low blood pressure is main concern  • Negative: [1] Systolic BP  >= 160 OR Diastolic >= 100 AND [2] cardiac or neurologic symptoms (e.g., chest pain, difficulty breathing, unsteady gait, blurred vision)  • Negative: [1] Pregnant > 20 weeks (or postpartum < 6 weeks) AND [2] new hand or face swelling  • Negative: [1] Pregnant > 20 weeks AND [2] BP Systolic BP  >= 140 OR Diastolic >= 90  • Negative: [1] Systolic BP  >= 200 OR Diastolic >= 120  AND [2] having NO cardiac or neurologic symptoms  • Negative: [1] Postpartum < 6 weeks AND [2] BP Systolic BP  >= 140 OR Diastolic >= 90  • Negative: [1] Systolic BP  >= 180 OR Diastolic >= 110 AND [2] missed most recent dose of blood pressure medication  • Negative: Systolic BP  >= 180 OR Diastolic >= 110  • Negative: Ran out of BP medications  • Negative: Shock suspected (e.g., cold/pale/clammy skin, too weak to stand, low BP, rapid pulse)  • Negative: Difficult to awaken or acting confused (e.g., disoriented, slurred speech)  •  Negative: [1] Difficulty breathing AND [2] bluish lips, tongue or face  • Negative: New-onset rash with multiple purple (or blood-colored) spots or dots  • Negative: Sounds like a life-threatening emergency to the triager  • Negative: Fever in a cancer patient who is currently (or recently) receiving chemotherapy or radiation therapy, or cancer patient who has metastatic or end-stage cancer and is receiving palliative care  • Negative: Pregnant  • Negative: Postpartum (from 0 to 6 weeks after delivery)  • Negative: Fever onset within 24 hours of receiving vaccine  • Negative: [1] Fever AND [2] within 14 days of COVID-19 Exposure  • Negative: Other symptom is present, see that guideline  (e.g., symptoms of cough, runny nose, sore throat, earache, abdominal pain, diarrhea, vomiting)  • Negative: [1] Headache AND [2] stiff neck (can't touch chin to chest)  • Negative: Difficulty breathing  • Negative: IV Drug Use (IVDU)  • Negative: [1] Drinking very little AND [2] dehydration suspected (e.g., no urine > 12 hours, very dry mouth, very lightheaded)  • Negative: Patient sounds very sick or weak to the triager  (Exception: mild weakness and hasn't taken fever medicine)  • Negative: Fever > 104 F (40 C)  • Negative: [1] Fever > 101 F (38.3 C) AND [2] age > 60 years  • Negative: [1] Fever > 100.0 F (37.8 C) AND [2] bedridden (e.g., nursing home patient, CVA, chronic illness, recovering from surgery)  • Negative: [1] Fever > 100.0 F (37.8 C) AND [2] indwelling urinary catheter (e.g., Krueger, Coude)  • Negative: [1] Fever > 100.0 F (37.8 C) AND [2] has port (portacath), central line, or PICC line  • Negative: [1] Fever > 100.0 F (37.8 C) AND [2] diabetes mellitus or weak immune system (e.g., HIV positive, cancer chemo, splenectomy, organ transplant, chronic steroids)  • Negative: [1] Fever > 100.0 F (37.8 C) AND [2] surgery in the last month  • Negative: Transplant patient (e.g., kidney, liver, lung, heart)  • Negative: Fever  "present > 3 days (72 hours)  • Negative: [1] Fever > 100.0 F (37.8 C) AND [2] foreign travel to a developing country in the last month    Answer Assessment - Initial Assessment Questions  1. BLOOD PRESSURE: \"What is the blood pressure?\" \"Did you take at least two measurements 5 minutes apart?\"      154/101 just prior to call; 135/101 before the higher pressure  2. ONSET: \"When did you take your blood pressure?\"      Just prior to call  3. HOW: \"How did you obtain the blood pressure?\" (e.g., visiting nurse, automatic home BP monitor)      automatic  4. HISTORY: \"Do you have a history of high blood pressure?\"      Not for a few years; states a doctor told her to stop taking her meds  5. MEDICATIONS: \"Are you taking any medications for blood pressure?\" \"Have you missed any doses recently?\"      no  6. OTHER SYMPTOMS: \"Do you have any symptoms?\" (e.g., headache, chest pain, blurred vision, difficulty breathing, weakness)      Headache and vomiting yesterday; tired; left wrist pain; back pain; fever  7. PREGNANCY: \"Is there any chance you are pregnant?\" \"When was your last menstrual period?\"      hysterectomy    Answer Assessment - Initial Assessment Questions  1. TEMPERATURE: \"What is the most recent temperature?\"  \"How was it measured?\"       100.9  2. ONSET: \"When did the fever start?\"       tonight  3. SYMPTOMS: \"Do you have any other symptoms besides the fever?\"  (e.g., colds, headache, sore throat, earache, cough, rash, diarrhea, vomiting, abdominal pain)      Headache yesterday, vomiting yesterday, back pain  4. CAUSE: If there are no symptoms, ask: \"What do you think is causing the fever?\"       virus  5. CONTACTS: \"Does anyone else in the family have an infection?\"        6. TREATMENT: \"What have you done so far to treat this fever?\" (e.g., medications)      nothing  7. IMMUNOCOMPROMISE: \"Do you have of the following: diabetes, HIV positive, splenectomy, cancer chemotherapy, chronic steroid treatment, " "transplant patient, etc.\"      cancer  8. PREGNANCY: \"Is there any chance you are pregnant?\" \"When was your last menstrual period?\"      na  9. TRAVEL: \"Have you traveled out of the country in the last month?\" (e.g., travel history, exposures)      na    Protocols used: HIGH BLOOD PRESSURE-ADULT-AH, FEVER-ADULT-AH      "

## 2022-10-27 ENCOUNTER — OFFICE VISIT (OUTPATIENT)
Dept: SURGERY | Age: 47
End: 2022-10-27
Payer: COMMERCIAL

## 2022-10-27 VITALS
HEIGHT: 65 IN | SYSTOLIC BLOOD PRESSURE: 128 MMHG | BODY MASS INDEX: 25.33 KG/M2 | TEMPERATURE: 97.8 F | WEIGHT: 152 LBS | DIASTOLIC BLOOD PRESSURE: 88 MMHG

## 2022-10-27 DIAGNOSIS — Z85.3 PERSONAL HISTORY OF MALIGNANT NEOPLASM OF BREAST: Primary | ICD-10-CM

## 2022-10-27 PROCEDURE — 99213 OFFICE O/P EST LOW 20 MIN: CPT | Performed by: PHYSICIAN ASSISTANT

## 2022-10-27 NOTE — PROGRESS NOTES
HISTORY OF PRESENT ILLNESS:     Treva Maza is a 41 y.o.  female who is here for a breast exam.   She is s/p bilateral skin and nipple sparing bilateral mastectomy on 8/26/14 for a 3.1 cm low grade ER positive and HER 2 negative invasive lobular carcinoma on the left. Ki67 is 19%. She underwent neoadjuvent chemotherapy with dramatic response. There was no gross tumor. There was residual microscopic disease over a 2.5 cm area. S/p breast reconstruction with Dr. Roscoe Greene. PHYSICAL EXAM:  The bilateral mastectomy and reconstruction incisions are well healed. There are appropriate post operative changes. There are no dominant masses, no skin changes and no axillary adenopathy. IMPRESSION:    Personal history of breast cancer    PLAN:   I will plan to see her in 1 year for exam.  She will call sooner with any concerns.

## 2023-02-22 DIAGNOSIS — C50.912: ICD-10-CM

## 2023-02-22 RX ORDER — TAMOXIFEN CITRATE 20 MG/1
TABLET ORAL
Qty: 90 TABLET | Refills: 3 | Status: SHIPPED | OUTPATIENT
Start: 2023-02-22

## 2023-11-01 ENCOUNTER — OFFICE VISIT (OUTPATIENT)
Dept: SURGERY | Age: 48
End: 2023-11-01
Payer: COMMERCIAL

## 2023-11-01 VITALS
DIASTOLIC BLOOD PRESSURE: 88 MMHG | HEIGHT: 65 IN | WEIGHT: 141 LBS | BODY MASS INDEX: 23.49 KG/M2 | SYSTOLIC BLOOD PRESSURE: 150 MMHG

## 2023-11-01 DIAGNOSIS — Z85.3 PERSONAL HISTORY OF MALIGNANT NEOPLASM OF BREAST: Primary | ICD-10-CM

## 2023-11-01 PROCEDURE — 99213 OFFICE O/P EST LOW 20 MIN: CPT | Performed by: PHYSICIAN ASSISTANT

## 2023-11-01 RX ORDER — LOSARTAN POTASSIUM 50 MG/1
TABLET ORAL
COMMUNITY
Start: 2023-02-16

## 2023-11-01 NOTE — PROGRESS NOTES
Emi Anand is a 57 y.o.  female who is here for a breast exam.   She is s/p bilateral skin and nipple sparing bilateral mastectomy on 8/26/14 for a 3.1 cm low grade ER positive and HER 2 negative invasive lobular carcinoma on the left. Ki67 is 19%. She underwent neoadjuvent chemotherapy with dramatic response. There was no gross tumor. There was residual microscopic disease over a 2.5 cm area. S/p breast reconstruction with Dr. Citlaly Bradley. Objective  Patient Active Problem List    Diagnosis Date Noted    History of colonic polyps 05/29/2020    Iron deficiency anemia 05/29/2020    Infiltrating lobular carcinoma of breast, stage 1, left ([address]) 05/29/2020    Encounter for monitoring tamoxifen therapy 05/29/2020    Hot flashes due to tamoxifen 10/29/2019    H/O cervical biopsy 04/27/2017    BRCA negative 03/27/2017    Atypical squamous cell changes of undetermined significance (ASCUS) on cervical cytology with positive high risk human papilloma virus (HPV) 03/27/2017    Use of tamoxifen (Nolvadex) 03/09/2017    Family history of uterine cancer 03/09/2017    Family history of colon cancer 03/09/2017    Rectal bleeding 06/10/2015    S/P bilateral mastectomy, skin and nipple sparin 8/26/14 09/03/2014    Cancer of left breast ([address]) 12 o'clock 3.1 cm 01/24/2014       Current Outpatient Medications   Medication Sig Dispense Refill    losartan (COZAAR) 50 MG tablet TAKE 1 TABLET BY MOUTH EVERY DAY for 30      tamoxifen (NOLVADEX) 20 MG tablet TAKE 1 TABLET BY MOUTH EVERY DAY 90 tablet 3    sertraline (ZOLOFT) 50 MG tablet Take 1 tablet by mouth daily Pt cute medication in half. busPIRone (BUSPAR) 5 MG tablet Take 1 tablet by mouth 3 times daily      Multiple Vitamins-Minerals (IMMUNE SYSTEM BOOSTER PO) Take by mouth (Patient not taking: Reported on 11/1/2023)       No current facility-administered medications for this visit.        Allergies Oxycodone-acetaminophen and Percocet

## 2024-02-13 DIAGNOSIS — C50.912: ICD-10-CM

## 2024-02-13 RX ORDER — TAMOXIFEN CITRATE 20 MG/1
TABLET ORAL
Qty: 90 TABLET | Refills: 1 | Status: SHIPPED | OUTPATIENT
Start: 2024-02-13

## 2024-07-24 ENCOUNTER — TRANSCRIBE ORDERS (OUTPATIENT)
Dept: ADMINISTRATIVE | Facility: HOSPITAL | Age: 49
End: 2024-07-24
Payer: COMMERCIAL

## 2024-07-24 DIAGNOSIS — R22.31 LOCALIZED SWELLING, MASS, OR LUMP OF UPPER EXTREMITY, RIGHT: Primary | ICD-10-CM

## 2024-08-15 DIAGNOSIS — C50.912: ICD-10-CM

## 2024-08-15 RX ORDER — TAMOXIFEN CITRATE 20 MG/1
TABLET ORAL
Qty: 90 TABLET | Refills: 3 | Status: SHIPPED | OUTPATIENT
Start: 2024-08-15

## 2024-08-29 ENCOUNTER — HOSPITAL ENCOUNTER (OUTPATIENT)
Dept: MRI IMAGING | Age: 49
Discharge: HOME | End: 2024-08-29
Payer: COMMERCIAL

## 2024-08-29 DIAGNOSIS — R22.31 LOCALIZED SWELLING, MASS AND LUMP, RIGHT UPPER LIMB: ICD-10-CM

## 2024-08-29 PROCEDURE — 73218 MRI UPPER EXTREMITY W/O DYE: CPT

## 2024-11-05 ENCOUNTER — OFFICE VISIT (OUTPATIENT)
Dept: SURGERY | Age: 49
End: 2024-11-05

## 2024-11-05 VITALS — BODY MASS INDEX: 25.88 KG/M2 | HEART RATE: 90 BPM | WEIGHT: 161 LBS | OXYGEN SATURATION: 100 % | HEIGHT: 66 IN

## 2024-11-05 DIAGNOSIS — Z85.3 PERSONAL HISTORY OF MALIGNANT NEOPLASM OF BREAST: Primary | ICD-10-CM

## 2024-11-05 NOTE — PROGRESS NOTES
Subjective  Nataly Lopes is a 48 y.o.  female who is here for a breast exam. She had noticed a place in her right axilla.   She is s/p bilateral skin and nipple sparing bilateral mastectomy on 8/26/14 for a 3.1 cm low grade ER positive and HER 2 negative invasive lobular carcinoma on the left. Ki67 is 19%. She underwent neoadjuvent chemotherapy with dramatic response. There was no gross tumor. There was residual microscopic disease over a 2.5 cm area. S/p breast reconstruction with Dr. Carey.          Objective  Patient Active Problem List    Diagnosis Date Noted    History of colonic polyps 05/29/2020    Iron deficiency anemia 05/29/2020    Infiltrating lobular carcinoma of breast, stage 1, left ([facility]) 05/29/2020    Encounter for monitoring tamoxifen therapy 05/29/2020    Hot flashes due to tamoxifen 10/29/2019    H/O cervical biopsy 04/27/2017    BRCA negative 03/27/2017    Atypical squamous cell changes of undetermined significance (ASCUS) on cervical cytology with positive high risk human papilloma virus (HPV) 03/27/2017    Use of tamoxifen (Nolvadex) 03/09/2017    Family history of uterine cancer 03/09/2017    Family history of colon cancer 03/09/2017    Rectal bleeding 06/10/2015    S/P bilateral mastectomy, skin and nipple sparin 8/26/14 09/03/2014    Cancer of left breast ([facility]) 12 o'clock 3.1 cm 01/24/2014       Current Outpatient Medications   Medication Sig Dispense Refill    tamoxifen (NOLVADEX) 20 MG tablet TAKE 1 TABLET BY MOUTH EVERY DAY 90 tablet 3    losartan (COZAAR) 50 MG tablet TAKE 1 TABLET BY MOUTH EVERY DAY for 30      Multiple Vitamins-Minerals (IMMUNE SYSTEM BOOSTER PO) Take by mouth      busPIRone (BUSPAR) 5 MG tablet Take 1 tablet by mouth 3 times daily       No current facility-administered medications for this visit.       Allergies Oxycodone-acetaminophen and Percocet [oxycodone-acetaminophen]    Past Medical History:   Diagnosis Date    Anemia     BRCA negative 03/2017

## 2024-12-11 ENCOUNTER — NURSE TRIAGE (OUTPATIENT)
Dept: CALL CENTER | Facility: HOSPITAL | Age: 49
End: 2024-12-11
Payer: COMMERCIAL

## 2024-12-12 NOTE — TELEPHONE ENCOUNTER
Takes Tamoxifen  Has hot flashes with this medication but this doesn't feel like a hot flash Has taken Tamoxifen for about 10 years.    143/93  Takes Losartan for B/P medication. Takes  Buspar. Has taken these medications for a while.    No new medications or foods Can think of nothing new    Face and ears are red, feels hot. Face feels like a sunburn, tightness   Redness just started maybe for 15 minutes.    Breaking out with something. Red bumps on chest.    Took a fat flush pill, before supper, she takes one in the morning and one in the evening it is all natural has been taking if for days. Her sister recommended medication     Rash does not itch    Has generic Benadryl advised to follow directions on package. Advised can cause drowsiness    Reviewed s/s to seek emergent treatment. SOB, wheezing, swelling of face lips tongue throat.    At end of call face feeling better not as hot or tight feeling.    Does not have a PCP Usually goes to Urgent Care for any problems    Dr Santo prescribes her Tamoxifen    Reason for Disposition   Mild localized rash    Additional Information   Negative: [1] Sudden onset of rash (within last 2 hours) AND [2] difficulty breathing or swallowing   Negative: Sounds like a life-threatening emergency to the triager   Negative: Athlete's Foot suspected (i.e., itchy rash between the toes)   Negative: Impetigo suspected (i.e., painless infected superficial small sores, less than 1 inch or 2.5 cm, often covered by a soft, yellow-brown scab or crust; sometimes occurring near nasal openings)   Negative: Insect bite(s) suspected   Negative: Jock Itch suspected (i.e., itchy rash on inner thighs near genital area)   Negative: Localized lump (or swelling) without redness or rash   Negative: [1] Mpox suspected (e.g., direct skin contact such as sex, recent travel to [place] or [place]) AND [2] symptoms of Mpox (e.g., rash, fever, muscle aches, or swollen lymph nodes)   Negative: [1] At risk  for Mpox (men-who-have-sex-with-men) AND [2] possible exposure (e.g., multiple sex partners in past 21 days) AND [3] symptoms of Mpox (e.g., rash, fever, muscle aches, or swollen lymph nodes)   Negative: Poison ivy, oak, or sumac rash suspected (e.g., itchy rash after contact with poison ivy)   Negative: Rash of female genital area  (e.g., labia, vagina, vulva)   Negative: Rash of male genital area (e.g., penis, scrotum)   Negative: Redness of immunization site   Negative: Ringworm suspected (i.e., round pink patch, sometimes looks like ring, usually 1/2 to 1 inch [12-25 mm],  in size, slowly increasing in size)   Negative: Shingles suspected (i.e., painful rash, multiple small blisters grouped together in one area of body; dermatomal distribution)   Negative: Small spot, skin growth, or mole   Negative: Sores or skin ulcer, not a rash   Negative: Wound infection suspected (i.e., pain, spreading redness, or pus; in a cut, puncture, scrape or sutured wound)   Negative: [1] Localized purple or blood-colored spots or dots AND [2] not from injury or friction AND [3] fever   Negative: Patient sounds very sick or weak to the triager   Negative: [1] Red area or streak AND [2] fever   Negative: [1] Rash is painful to touch AND [2] fever   Negative: [1] Looks infected (spreading redness, pus) AND [2] large red area (> 2 in. or 5 cm)   Negative: [1] Looks infected (spreading redness, pus) AND [2] diabetes mellitus or weak immune system (e.g., HIV positive, cancer chemo, splenectomy, organ transplant, chronic steroids)   Negative: [1] Localized purple or blood-colored spots or dots AND [2] not from injury or friction AND [3] no fever   Negative: [1] Looks infected (spreading redness, pus) AND [2] no fever   Negative: Looks like a boil, infected sore, deep ulcer or other infected rash   Negative: [1] Localized rash is very painful AND [2] no fever   Negative: Genital area rash   Negative: Lyme disease suspected (e.g., bull's  "eye rash or tick bite / exposure)   Negative: [1] Applying cream or ointment AND [2] causes severe itch, burning or pain   Negative: Medication patch causing local rash or itching   Negative: [1] Pimples (localized) AND [2 ] no improvement after using Care Advice   Negative: Tender bumps in armpits   Negative: [1] Severe localized itching AND [2] after 2 days of steroid cream   Negative: Localized rash present > 7 days   Negative: Red, moist, irritated area between skin folds (or under larger breasts)   Negative: [1] Localized area of skin darkening or thickening on lower legs or ankles AND [2] has NOT been evaluated by a doctor (or NP/PA)    Answer Assessment - Initial Assessment Questions  1. APPEARANCE of RASH: \"Describe the rash.\"       *No Answer*  2. LOCATION: \"Where is the rash located?\"       *No Answer*  3. NUMBER: \"How many spots are there?\"       *No Answer*  4. SIZE: \"How big are the spots?\" (Inches, centimeters or compare to size of a coin)       Pencil lead size  5. ONSET: \"When did the rash start?\"     Just now  chest is red with size of pencil lead  6. ITCHING: \"Does the rash itch?\" If Yes, ask: \"How bad is the itch?\"  (Scale 0-10; or none, mild, moderate, severe)        None, denies itching  7. PAIN: \"Does the rash hurt?\" If Yes, ask: \"How bad is the pain?\"  (Scale 0-10; or none, mild, moderate, severe)     - NONE (0): no pain     - MILD (1-3): doesn't interfere with normal activities      - MODERATE (4-7): interferes with normal activities or awakens from sleep      - SEVERE (8-10): excruciating pain, unable to do any normal activities      Denies pain  8. OTHER SYMPTOMS: \"Do you have any other symptoms?\" (e.g., fever)      Not that she is aware of  9. PREGNANCY: \"Is there any chance you are pregnant?\" \"When was your last menstrual period?\"     na    Protocols used: Rash or Redness - Localized-ADULT-AH    "

## (undated) DEVICE — TROCAR: Brand: KII® SLEEVE

## (undated) DEVICE — CONTROL SYRINGE LUER-LOCK TIP: Brand: MONOJECT

## (undated) DEVICE — SURGICAL PROCEDURE PACK GYNECOLOGIC MIN LOURDES HOSP

## (undated) DEVICE — MEDI-VAC YANK SUCT HNDL W/TPRD BULBOUS TIP: Brand: CARDINAL HEALTH

## (undated) DEVICE — SOLUTION IV IRRIG POUR BRL 0.9% SODIUM CHL 2F7124

## (undated) DEVICE — KIT URIN CATHETER 16 FR 5 CC M INDWL SIL

## (undated) DEVICE — SUTURE MCRYL SZ 1 L36IN ABSRB UD L36MM CT-1 1/2 CIR Y947H

## (undated) DEVICE — SUTURE ETHLN SZ 3-0 L18IN NONABSORBABLE BLK FS-1 L24MM 3/8 663H

## (undated) DEVICE — SOLUTION IV IRRIG WATER 1000ML POUR BRL 2F7114

## (undated) DEVICE — GYN LAP CDS

## (undated) DEVICE — TROCAR: Brand: KII SHIELDED BLADED ACCESS SYSTEM

## (undated) DEVICE — ENDO KIT,LOURDES HOSPITAL: Brand: MEDLINE INDUSTRIES, INC.

## (undated) DEVICE — SHEARS ENDOSCP L23CM DIA5MM ULTRASONIC CRV TIP W/ ADV

## (undated) DEVICE — MINI ENDOCUT SCISSOR TIP, DISPOSABLE: Brand: RENEW

## (undated) DEVICE — NEEDLE INSUF L150MM DIA2MM DISP FOR PNEUMOPERI ENDOPATH

## (undated) DEVICE — SOLUTION IV 1000ML 0.9% SOD CHL PH 5 INJ USP VIAFLX PLAS

## (undated) DEVICE — GLOVE SURG SZ 8 CRM LTX FREE POLYISOPRENE POLYMER BEAD ANTI

## (undated) DEVICE — GLOVE SURG SZ 85 CRM LTX FREE POLYISOPRENE POLYMER BEAD ANTI

## (undated) DEVICE — VCARE LARGE UTERINE MANIPULATOR: Brand: VCARE LARGE

## (undated) DEVICE — SPONGE GZ L3FTXW2IN COT VAG XR DTECT 4 PLY MESH PK RL

## (undated) DEVICE — PUMP SUC IRR TBNG L10FT W/ HNDPC ASSEMB STRYKEFLOW 2